# Patient Record
Sex: FEMALE | Race: WHITE | NOT HISPANIC OR LATINO | Employment: OTHER | URBAN - METROPOLITAN AREA
[De-identification: names, ages, dates, MRNs, and addresses within clinical notes are randomized per-mention and may not be internally consistent; named-entity substitution may affect disease eponyms.]

---

## 2017-03-21 ENCOUNTER — OFFICE VISIT (OUTPATIENT)
Dept: INTERNAL MEDICINE | Facility: CLINIC | Age: 77
End: 2017-03-21

## 2017-03-21 VITALS
BODY MASS INDEX: 25.69 KG/M2 | HEART RATE: 72 BPM | RESPIRATION RATE: 16 BRPM | OXYGEN SATURATION: 96 % | TEMPERATURE: 97.6 F | WEIGHT: 152 LBS | SYSTOLIC BLOOD PRESSURE: 112 MMHG | DIASTOLIC BLOOD PRESSURE: 60 MMHG

## 2017-03-21 DIAGNOSIS — J01.00 ACUTE NON-RECURRENT MAXILLARY SINUSITIS: Primary | ICD-10-CM

## 2017-03-21 DIAGNOSIS — H66.91 RIGHT OTITIS MEDIA, UNSPECIFIED CHRONICITY, UNSPECIFIED OTITIS MEDIA TYPE: ICD-10-CM

## 2017-03-21 PROCEDURE — 99213 OFFICE O/P EST LOW 20 MIN: CPT | Performed by: NURSE PRACTITIONER

## 2017-03-21 RX ORDER — AZITHROMYCIN 250 MG/1
TABLET, FILM COATED ORAL
Qty: 6 TABLET | Refills: 0 | Status: SHIPPED | OUTPATIENT
Start: 2017-03-21 | End: 2017-06-19

## 2017-03-21 RX ORDER — FLUTICASONE PROPIONATE 50 MCG
2 SPRAY, SUSPENSION (ML) NASAL DAILY
Qty: 1 EACH | Refills: 0 | Status: SHIPPED | OUTPATIENT
Start: 2017-03-21 | End: 2017-04-20

## 2017-03-21 NOTE — PATIENT INSTRUCTIONS
1. Start Z-pack today. Take 2 tabs today, then 1 tab daily.    2. Take Mucinex Plain 2 times/day for th next week.    3. Use nasal saline followed by Flonase nasal spray - 1 puff each nostril daily, for the next month.    4. Use Visine eye drops daily as needed for itchy eyes. May try Zaditor eye drops daily for itchiness.    5. Track temp daily. Take Tylenol as needed for fever or pain.    6. Rest this week, avoiding exposure to dust and allergens.    7. Call on Friday if not improving. Call Monday if not completely better.    8. Continue other meds.    9. Fasting f/u in June.

## 2017-03-21 NOTE — PROGRESS NOTES
Subjective   Joanne Tineo is a 76 y.o. female.     History of Present Illness     1. Pt c/o feeling like she had the flu with chills, low grade fever of 99 for 2 days about 2 weeks ago. After the 2 days, she developed pink eye. Went to [facility] where she was prescribed Cipro eye drops for pink eye. Because she had a slight tickly cough, was prescribed Augmentin 2 times/day for one week. She feels she got somewhat better, but continued to have intermittent sort throat, right ear ache, sinus congestion, and mild tickly dry cough. She has taken Tylenol with some relief of ear pain; Mucinex 2 times/day for the past week. She also reports that she had major hardwood floor refinishing done at home in the past month with lots of dust throughout her home; dust has now been cleaned out. Pt reports she typically does not have problems with allergies.    The following portions of the patient's history were reviewed and updated as appropriate: allergies, current medications, past family history, past medical history, past social history, past surgical history and problem list.    Review of Systems   Constitutional: Negative for chills, fatigue and fever.   HENT: Positive for ear pain (right), hearing loss (wears hearing aids), sinus pressure and sore throat. Negative for congestion and trouble swallowing.    Eyes: Positive for itching. Negative for pain.   Respiratory: Positive for cough (mild tickly). Negative for shortness of breath and wheezing.    Cardiovascular: Negative for chest pain, palpitations and leg swelling.   Gastrointestinal: Negative for abdominal pain, diarrhea and nausea.   Endocrine: Negative for cold intolerance and heat intolerance.   Genitourinary: Negative for dysuria and hematuria.   Musculoskeletal: Negative for arthralgias, back pain and myalgias.   Skin: Negative for rash and wound.   Allergic/Immunologic: Negative for environmental allergies and food allergies.   Neurological: Negative for  dizziness and headaches.   Hematological: Negative for adenopathy. Does not bruise/bleed easily.   Psychiatric/Behavioral: Negative for sleep disturbance. The patient is not nervous/anxious.        Objective   Blood pressure 112/60, pulse 72, temperature 97.6 °F (36.4 °C), temperature source Oral, resp. rate 16, weight 152 lb (68.9 kg), SpO2 96 %.    Physical Exam   Constitutional: She is oriented to person, place, and time. She appears well-developed and well-nourished.   HENT:   Right Ear: Tympanic membrane is erythematous (mild). Tympanic membrane is not bulging.   Left Ear: Tympanic membrane and ear canal normal.   Nose: Mucosal edema (2+) present. Right sinus exhibits maxillary sinus tenderness (mild). Right sinus exhibits no frontal sinus tenderness. Left sinus exhibits no maxillary sinus tenderness and no frontal sinus tenderness.   Eyes: Lids are normal. Right conjunctiva is injected (mild). Left conjunctiva is injected (mild).   Cardiovascular: Normal rate, regular rhythm and normal heart sounds.    No murmur heard.  Pulmonary/Chest: Effort normal and breath sounds normal.   Abdominal: Normal appearance and bowel sounds are normal. There is no hepatosplenomegaly. There is no tenderness.   Lymphadenopathy:     She has no cervical adenopathy.   Neurological: She is alert and oriented to person, place, and time.   Skin: Skin is warm and dry.   Psychiatric: She has a normal mood and affect. Her speech is normal and behavior is normal.   Vitals reviewed.    Procedures  Assessment/Plan   Joanne was seen today for sinusitis.    Diagnoses and all orders for this visit:    Acute non-recurrent maxillary sinusitis  -     azithromycin (ZITHROMAX Z-LEONILA) 250 MG tablet; Take 2 tablets the first day, then 1 tablet daily for 4 days.  -     fluticasone (FLONASE) 50 MCG/ACT nasal spray; 2 sprays into each nostril Daily for 30 days.    Right otitis media, unspecified chronicity, unspecified otitis media type    1. Sinusitis  and right otitis media: Pt most likely had viral infection 2 weeks ago, which did not respond to Augmentin therapy. She now appears to have a secondary sinusitis and right otitis media. She hopefully will respond to Z-pack. She should take Mucinex Plain 2 times/day for the next week and use nasal saline and Flonase to reduce inflammation in her sinuses. She is to call on Friday is not improving and Monday if not completely better.     2. Pt may have had some allergic activity related to dust exposure in her home. Her eyes have been itchy. Will treat with OTC Visine and Zaditor if needed. She understands to contact this office if her eyes do not improve with this therapy.     Continue other meds.    Fasting f/u in June.    Patient Instructions   1. Start Z-pack today. Take 2 tabs today, then 1 tab daily.    2. Take Mucinex Plain 2 times/day for th next week.    3. Use nasal saline followed by Flonase nasal spray - 1 puff each nostril daily, for the next month.    4. Use Visine eye drops daily as needed for itchy eyes. May try Zaditor eye drops daily for itchiness.    5. Track temp daily. Take Tylenol as needed for fever or pain.    6. Rest this week, avoiding exposure to dust and allergens.    7. Call on Friday if not improving. Call Monday if not completely better.    8. Continue other meds.    9. Fasting f/u in June.      Electronically signed by KIMBERLY Joyce 3/21/2017 1:33 PM

## 2017-06-19 ENCOUNTER — OFFICE VISIT (OUTPATIENT)
Dept: INTERNAL MEDICINE | Facility: CLINIC | Age: 77
End: 2017-06-19

## 2017-06-19 VITALS
TEMPERATURE: 98.3 F | RESPIRATION RATE: 16 BRPM | DIASTOLIC BLOOD PRESSURE: 80 MMHG | OXYGEN SATURATION: 96 % | HEART RATE: 72 BPM | SYSTOLIC BLOOD PRESSURE: 120 MMHG | BODY MASS INDEX: 25.33 KG/M2 | HEIGHT: 65 IN | WEIGHT: 152 LBS

## 2017-06-19 DIAGNOSIS — R53.83 FATIGUE, UNSPECIFIED TYPE: ICD-10-CM

## 2017-06-19 DIAGNOSIS — H91.93 HEARING IMPAIRMENT, BILATERAL: ICD-10-CM

## 2017-06-19 DIAGNOSIS — G47.9 DYSSOMNIA: ICD-10-CM

## 2017-06-19 DIAGNOSIS — H35.30 MACULAR DEGENERATION: ICD-10-CM

## 2017-06-19 DIAGNOSIS — R73.9 HYPERGLYCEMIA: ICD-10-CM

## 2017-06-19 DIAGNOSIS — I44.4 LEFT ANTERIOR FASCICULAR BLOCK: ICD-10-CM

## 2017-06-19 DIAGNOSIS — E78.00 PURE HYPERCHOLESTEROLEMIA: Primary | ICD-10-CM

## 2017-06-19 DIAGNOSIS — E53.9 VITAMIN B DEFICIENCY: ICD-10-CM

## 2017-06-19 DIAGNOSIS — F32.9 REACTIVE DEPRESSION: ICD-10-CM

## 2017-06-19 DIAGNOSIS — E55.9 VITAMIN D DEFICIENCY: ICD-10-CM

## 2017-06-19 DIAGNOSIS — K58.9 IRRITABLE BOWEL SYNDROME WITHOUT DIARRHEA: ICD-10-CM

## 2017-06-19 PROBLEM — J01.00 ACUTE NON-RECURRENT MAXILLARY SINUSITIS: Status: RESOLVED | Noted: 2017-03-21 | Resolved: 2017-06-19

## 2017-06-19 PROBLEM — H66.91 RIGHT OTITIS MEDIA: Status: RESOLVED | Noted: 2017-03-21 | Resolved: 2017-06-19

## 2017-06-19 LAB
25(OH)D3+25(OH)D2 SERPL-MCNC: 26.8 NG/ML
ALBUMIN SERPL-MCNC: 4.1 G/DL (ref 3.2–4.8)
ALBUMIN/GLOB SERPL: 1.5 G/DL (ref 1.5–2.5)
ALP SERPL-CCNC: 124 U/L (ref 25–100)
ALT SERPL-CCNC: 30 U/L (ref 7–40)
APPEARANCE UR: CLEAR
AST SERPL-CCNC: 25 U/L (ref 0–33)
BACTERIA #/AREA URNS HPF: ABNORMAL /HPF
BASOPHILS # BLD AUTO: 0.01 10*3/MM3 (ref 0–0.2)
BASOPHILS NFR BLD AUTO: 0.2 % (ref 0–1)
BILIRUB SERPL-MCNC: 0.6 MG/DL (ref 0.3–1.2)
BILIRUB UR QL STRIP: NEGATIVE
BUN SERPL-MCNC: 22 MG/DL (ref 9–23)
BUN/CREAT SERPL: 31.4 (ref 7–25)
CALCIUM SERPL-MCNC: 9.5 MG/DL (ref 8.7–10.4)
CASTS URNS MICRO: ABNORMAL
CHLORIDE SERPL-SCNC: 105 MMOL/L (ref 99–109)
CHOLEST SERPL-MCNC: 200 MG/DL (ref 0–200)
CO2 SERPL-SCNC: 29 MMOL/L (ref 20–31)
COLOR UR: YELLOW
CREAT SERPL-MCNC: 0.7 MG/DL (ref 0.6–1.3)
EOSINOPHIL # BLD AUTO: 0.03 10*3/MM3 (ref 0.1–0.3)
EOSINOPHIL NFR BLD AUTO: 0.5 % (ref 0–3)
EPI CELLS #/AREA URNS HPF: ABNORMAL /HPF
ERYTHROCYTE [DISTWIDTH] IN BLOOD BY AUTOMATED COUNT: 14.4 % (ref 11.3–14.5)
GLOBULIN SER CALC-MCNC: 2.7 GM/DL
GLUCOSE SERPL-MCNC: 101 MG/DL (ref 70–100)
GLUCOSE UR QL: NEGATIVE
HBA1C MFR BLD: 6.2 % (ref 4.8–5.6)
HCT VFR BLD AUTO: 48.4 % (ref 34.5–44)
HDLC SERPL-MCNC: 46 MG/DL (ref 40–60)
HGB BLD-MCNC: 15.3 G/DL (ref 11.5–15.5)
HGB UR QL STRIP: NEGATIVE
IMM GRANULOCYTES # BLD: 0.01 10*3/MM3 (ref 0–0.03)
IMM GRANULOCYTES NFR BLD: 0.2 % (ref 0–0.6)
KETONES UR QL STRIP: NEGATIVE
LDLC SERPL CALC-MCNC: 107 MG/DL (ref 0–100)
LEUKOCYTE ESTERASE UR QL STRIP: ABNORMAL
LYMPHOCYTES # BLD AUTO: 1.92 10*3/MM3 (ref 0.6–4.8)
LYMPHOCYTES NFR BLD AUTO: 32.4 % (ref 24–44)
MCH RBC QN AUTO: 28.5 PG (ref 27–31)
MCHC RBC AUTO-ENTMCNC: 31.6 G/DL (ref 32–36)
MCV RBC AUTO: 90.1 FL (ref 80–99)
MONOCYTES # BLD AUTO: 0.43 10*3/MM3 (ref 0–1)
MONOCYTES NFR BLD AUTO: 7.3 % (ref 0–12)
NEUTROPHILS # BLD AUTO: 3.53 10*3/MM3 (ref 1.5–8.3)
NEUTROPHILS NFR BLD AUTO: 59.4 % (ref 41–71)
NITRITE UR QL STRIP: NEGATIVE
PH UR STRIP: 7 [PH] (ref 5–8)
PLATELET # BLD AUTO: 221 10*3/MM3 (ref 150–450)
POTASSIUM SERPL-SCNC: 5 MMOL/L (ref 3.5–5.5)
PROT SERPL-MCNC: 6.8 G/DL (ref 5.7–8.2)
PROT UR QL STRIP: NEGATIVE
RBC # BLD AUTO: 5.37 10*6/MM3 (ref 3.89–5.14)
RBC #/AREA URNS HPF: ABNORMAL /HPF
SODIUM SERPL-SCNC: 142 MMOL/L (ref 132–146)
SP GR UR: 1.02 (ref 1–1.03)
TRIGL SERPL-MCNC: 236 MG/DL (ref 0–150)
TSH SERPL DL<=0.005 MIU/L-ACNC: 1.7 MIU/ML (ref 0.35–5.35)
UROBILINOGEN UR STRIP-MCNC: ABNORMAL MG/DL
VIT B12 SERPL-MCNC: 602 PG/ML (ref 211–911)
VLDLC SERPL CALC-MCNC: 47.2 MG/DL
WBC # BLD AUTO: 5.93 10*3/MM3 (ref 3.5–10.8)
WBC #/AREA URNS HPF: ABNORMAL /HPF

## 2017-06-19 PROCEDURE — 93000 ELECTROCARDIOGRAM COMPLETE: CPT | Performed by: INTERNAL MEDICINE

## 2017-06-19 PROCEDURE — 99215 OFFICE O/P EST HI 40 MIN: CPT | Performed by: INTERNAL MEDICINE

## 2017-06-19 RX ORDER — ESCITALOPRAM OXALATE 20 MG/1
10 TABLET ORAL DAILY
Qty: 135 TABLET | Refills: 3 | Status: SHIPPED | OUTPATIENT
Start: 2017-06-19 | End: 2018-08-02 | Stop reason: SDUPTHER

## 2017-06-19 NOTE — PROGRESS NOTES
Subjective   Joanne Tineo is a 77 y.o. female.     Chief Complaint   Patient presents with   • Hyperlipidemia       History of Present Illness     The patient has a moderate history of hyperlipidemia with a total value greater than 300.  Both parents lived to older ages but  of heart disease.  She has moderate heart disease in multiple family members.  In recent years she is prediabetic with a hemoglobin A1c of 6.0% last winter.  She has never used tobacco products.  She does follow a diabetic diet.  She has a regular walking program.  She has never had an atherosclerotic event.    The following portions of the patient's history were reviewed and updated as appropriate: allergies, current medications, past family history, past medical history, past social history, past surgical history and problem list.    Active Ambulatory Problems     Diagnosis Date Noted   • Compression fracture of lumbar vertebra 2016   • Depression 2016   • Hearing impairment 2016   • Hyperlipidemia 2016   • Irritable bowel syndrome 2016   • Left anterior fascicular block 2016   • Macular degeneration 2016   • Menopausal and postmenopausal disorder 2016   • Dyssomnia 2016   • Vitamin B deficiency 2016   • Preventative health care 2016   • Hyperglycemia 2016   • Family history of heart disease 2016     Resolved Ambulatory Problems     Diagnosis Date Noted   • Cardiac disease 2016   • Impaired glucose tolerance 2016   • Prediabetes 2016   • Acute non-recurrent maxillary sinusitis 2017   • Right otitis media 2017     Past Medical History:   Diagnosis Date   • Chronic lower back pain    • Depression    • Edema    • Fracture of pelvis    • Hearing impairment    • Hypercholesterolemia    • Hyperlipidemia    • Irritable bowel syndrome    • Macular degeneration    • Pneumonia    • Prediabetes    • Sleep disorder     • Vitamin D deficiency      Past Surgical History:   Procedure Laterality Date   • DIAGNOSTIC LAPAROSCOPY      for infertility   • TONSILLECTOMY       Family History   Problem Relation Age of Onset   • Heart attack Mother       age 84   • Coronary artery disease Father 79     CABG   • Dementia Father       age 90   • Colon cancer Brother 65      age 70   • Heart disease Other      Multiple family members    • Kidney cancer Other            Social History     Social History   • Marital status:      Spouse name: N/A   • Number of children: N/A   • Years of education: N/A     Occupational History   • Not on file.     Social History Main Topics   • Smoking status: Never Smoker   • Smokeless tobacco: Never Used   • Alcohol use 1.2 oz/week     2 Glasses of wine per week   • Drug use: No   • Sexual activity: No     Other Topics Concern   • Not on file     Social History Narrative    Domestic life- Lives at home alone  -          Holiness- Catholic        Sleep hygiene- in bed 10 PM to 6 AM for 7 or 8 hours - often broken - off clonazepam         Caffeine use- Drinks 2 cups of coffee daily        Exercise habits- Walks 30 minutes daily - pilates 2 days weekly  compliance irregular        Diet-   low calorie diabetic diet - low in salt low in sugar        Occupation- Retired teacher        Hearing : Corrects with hearing aids         Vision : Corrects with reading and distant vision glasses        Driving : No limitations             Review of Systems   Constitutional: Negative for appetite change and fatigue.   HENT: Negative for ear pain and sore throat.    Eyes: Negative for itching and visual disturbance.   Respiratory: Negative for cough and shortness of breath.    Cardiovascular: Negative for chest pain and palpitations.   Gastrointestinal: Negative for abdominal pain and nausea.   Endocrine: Negative for cold intolerance and heat intolerance.   Genitourinary:  "Negative for dysuria and hematuria.   Musculoskeletal: Negative for arthralgias and back pain.   Skin: Negative for rash and wound.   Allergic/Immunologic: Negative for environmental allergies and food allergies.   Neurological: Negative for dizziness and headaches.   Hematological: Negative for adenopathy. Does not bruise/bleed easily.   Psychiatric/Behavioral: Positive for dysphoric mood. Negative for sleep disturbance. The patient is nervous/anxious.         Sleep disturbance mildly improved has been off of clonazepam for 3 months.  Anxiety and depression responding well to Lexapro.       Objective   Blood pressure 120/80, pulse 72, temperature 98.3 °F (36.8 °C), temperature source Oral, resp. rate 16, height 65\" (165.1 cm), weight 152 lb (68.9 kg), SpO2 96 %.    Physical Exam   Constitutional: She is oriented to person, place, and time. She appears well-developed and well-nourished. No distress.   HENT:   Right Ear: External ear normal.   Left Ear: External ear normal.   Nose: Nose normal.   Mouth/Throat: Oropharynx is clear and moist.   Eyes: EOM are normal. Pupils are equal, round, and reactive to light. No scleral icterus.   Neck: Normal range of motion. Neck supple. No JVD present. No thyromegaly present.   Cardiovascular: Normal rate, regular rhythm, normal heart sounds and intact distal pulses.    No murmur heard.  Pulmonary/Chest: Effort normal and breath sounds normal. She has no wheezes. She has no rales.   Abdominal: Soft. Bowel sounds are normal. She exhibits no distension and no mass. There is no tenderness.   Genitourinary:   Genitourinary Comments: [facility]   Musculoskeletal: Normal range of motion. She exhibits no edema or tenderness.   Lymphadenopathy:     She has no cervical adenopathy.   Neurological: She is alert and oriented to person, place, and time. She displays normal reflexes. No cranial nerve deficit. She exhibits normal muscle tone. Coordination normal.   Vibratory " normal  Romberg negative  Gait normal  Plantars downgoing             Skin: Skin is warm and dry. No rash noted.   Breast exam normal   Psychiatric: She has a normal mood and affect. Her behavior is normal. Judgment and thought content normal.   Nursing note and vitals reviewed.      ECG 12 Lead  Date/Time: 6/19/2017 8:59 AM  Performed by: YANG COTA  Authorized by: YANG COTA   Interpreted by ED physician: Yang Cota M.D.  Comparison: compared with previous ECG from 5/18/2016  Similar to previous ECG  Rhythm: sinus rhythm  Rate: normal  BPM: 65  Conduction: LAFB  ST Segments: ST segments normal  T Waves: T waves normal  QRS axis: left  Other: no other findings  Clinical impression: non-specific ECG  Comments: Indication - LAFB  Baseline EKG          Assessment/Plan   Joanne was seen today for hyperlipidemia.    Diagnoses and all orders for this visit:    Pure hypercholesterolemia  -     Comprehensive Metabolic Panel  -     Lipid Panel    Left anterior fascicular block  -     ECG 12 Lead    Irritable bowel syndrome without diarrhea  -     CBC & Differential  -     Urinalysis With / Microscopic If Indicated    Vitamin B deficiency  -     Vitamin B12    Reactive depression    Dyssomnia    Hyperglycemia  -     Hemoglobin A1c    Hearing impairment, bilateral    Macular degeneration    Vitamin D deficiency  -     Vitamin D 25 Hydroxy    Fatigue, unspecified type  -     TSH    Other orders  -     escitalopram (LEXAPRO) 20 MG tablet; Take 0.5 tablets by mouth Daily. Take total of 15 mg daily.  This EMR will not accept this dose.  -     Microscopic Examination      The patient has a marked increased risk of atherosclerotic disease.    Her LDL cholesterol has increased to 107.  She will need to increase atorvastatin and may need to move to Crestor or Vytorin.  I've asked her to continue on aspirin for primary prevention.    Her hemoglobin A1c has increased and she may need metformin in the next year.   Her prediabetes is his second factor increasing risk of heart disease.    The patient's had a lifelong history of clinical depression.  She had acute flare 7 years ago with the death of her .  She is done extremely well this last year.  We will drop her to Lexapro 15 mg daily to seek out an optimal dose.  She has a stable lifestyle and has a well-balanced diet including international travel plans.    The patient has irritable bowel syndrome which has greatly stabilized with diet and psyllium.  Her brother  at age 70 of colon cancer.  I've counseled the patient on her cancer risk.  The patient thinks that she had a colonoscopy in the last year but we do not have a copy.     Patient Instructions   1.  Continue same medications and supplements - as listed.    2.  Reduce Lexapro 15 mg daily - for long-term safety.    3.  Improve upper body fitness - dedicated exercises twice weekly.    4.  Continue Pilates - twice weekly.    5.  Walk 6000 steps to 9000 steps daily - for aerobic conditioning.    6.  Follow a low-calorie diabetic diet - low in salt and low in sugar.    7.  Schedule pelvic exam this year - for complete checkup.    8.  Return visit September - fasting checkup and wellness exam.    9.  Hemoglobin A1c slightly higher at 6.2%.  Continue tight diabetic diet and daily walking.    10.  Vitamin D level mildly low at 27.  Start vitamin D3 2000 and is daily.    11.  LDL cholesterol mildly elevated 107.  Increase atorvastatin 80 mg daily.    12.  Other laboratory tests are acceptable and require no change in treatment.    Current Outpatient Prescriptions:   •  aspirin 81 MG tablet, Take 1 tablet by mouth. 3-7 days weekly , Disp: , Rfl:   •  atorvastatin (LIPITOR) 40 MG tablet, Take 1 tablet by mouth Every Night., Disp: 90 tablet, Rfl: 1  •  calcium-vitamin D (OSCAL 500/200 D-3) 500-200 MG-UNIT per tablet, Take 1 tablet by mouth daily., Disp: , Rfl:   •  Coenzyme Q10 (CO Q-10) 200 MG capsule, Take 1  capsule by mouth daily., Disp: , Rfl:   •  escitalopram (LEXAPRO) 20 MG tablet, Take 0.5 tablets by mouth Daily. Take total of 15 mg daily.  This EMR will not accept this dose., Disp: 135 tablet, Rfl: 3  •  Multiple Vitamins-Minerals (PRESERVISION AREDS 2) capsule, Take 1 capsule by mouth daily., Disp: , Rfl:   •  Multiple Vitamins-Minerals (WOMENS MULTI VITAMIN & MINERAL) tablet, Take 1 tablet by mouth daily., Disp: , Rfl:   •  Omega-3 Fatty Acids (FISH OIL) 1000 MG capsule capsule, Take 1 capsule by mouth daily., Disp: , Rfl:   •  psyllium (METAMUCIL) 0.52 G capsule, Take 3 capsules by mouth daily., Disp: , Rfl:     No Known Allergies    Immunization History   Administered Date(s) Administered   • Influenza, Quadrivalent 11/02/2015   • Pneumococcal Polysaccharide 11/02/2015   • Tdap 08/16/2012   • Zoster 01/01/2015   • influenza Split 12/05/2016     Electronically signed Yang Cota M.D.6/20/2017 6:44 AM

## 2017-06-19 NOTE — PATIENT INSTRUCTIONS
1.  Continue same medications and supplements - as listed.    2.  Reduce Lexapro 15 mg daily - for long-term safety.    3.  Improve upper body fitness - dedicated exercises twice weekly.    4.  Continue Pilates - twice weekly.    5.  Walk 6000 steps to 9000 steps daily - for aerobic conditioning.    6.  Follow a low-calorie diabetic diet - low in salt and low in sugar.    7.  Schedule pelvic exam this year - for complete checkup.    8.  Return visit September - fasting checkup and wellness exam.

## 2017-06-20 RX ORDER — CHOLECALCIFEROL (VITAMIN D3) 125 MCG
1 CAPSULE ORAL DAILY
COMMUNITY
End: 2022-12-14

## 2017-06-22 ENCOUNTER — TELEPHONE (OUTPATIENT)
Dept: INTERNAL MEDICINE | Facility: CLINIC | Age: 77
End: 2017-06-22

## 2017-06-22 RX ORDER — ATORVASTATIN CALCIUM 80 MG/1
80 TABLET, FILM COATED ORAL NIGHTLY
Qty: 90 TABLET | Refills: 1 | Status: SHIPPED | OUTPATIENT
Start: 2017-06-22 | End: 2018-01-29 | Stop reason: SDUPTHER

## 2017-06-22 NOTE — TELEPHONE ENCOUNTER
Called lab results to pt. Per TGF:  - HbA1c up 6.2%. Cont tight DM diet & QD walking.  - D level mildly low at 27 - restart vit D3 2000 QD (was off it while on vacation)  - .  Increase atorvastatin 80 mg daily.  - Other results ok - require no change in treatment.  Pt verb understanding.

## 2017-06-26 ENCOUNTER — TELEPHONE (OUTPATIENT)
Dept: INTERNAL MEDICINE | Facility: CLINIC | Age: 77
End: 2017-06-26

## 2017-06-26 NOTE — TELEPHONE ENCOUNTER
Called pt for most recent colonoscopy by who and when per TGF - pt states it was rather recent, 2016?, but will call back with info.

## 2017-06-26 NOTE — TELEPHONE ENCOUNTER
----- Message from Ambika Khan sent at 6/26/2017  9:30 AM EDT -----  someone called and ask for information regarding her last colonoscopy   Dr.Warren Barnes  8- just wanted to pass along this information        TGF notified. Maryam to get report sent over per TGF.

## 2017-10-05 ENCOUNTER — LAB REQUISITION (OUTPATIENT)
Dept: LAB | Facility: HOSPITAL | Age: 77
End: 2017-10-05

## 2017-10-05 ENCOUNTER — OFFICE VISIT (OUTPATIENT)
Dept: INTERNAL MEDICINE | Facility: CLINIC | Age: 77
End: 2017-10-05

## 2017-10-05 VITALS
HEART RATE: 68 BPM | WEIGHT: 153.2 LBS | SYSTOLIC BLOOD PRESSURE: 112 MMHG | RESPIRATION RATE: 16 BRPM | HEIGHT: 64 IN | OXYGEN SATURATION: 97 % | DIASTOLIC BLOOD PRESSURE: 64 MMHG | TEMPERATURE: 97.7 F | BODY MASS INDEX: 26.15 KG/M2

## 2017-10-05 DIAGNOSIS — F32.9 REACTIVE DEPRESSION: ICD-10-CM

## 2017-10-05 DIAGNOSIS — E55.9 VITAMIN D DEFICIENCY: ICD-10-CM

## 2017-10-05 DIAGNOSIS — E78.00 PURE HYPERCHOLESTEROLEMIA: Primary | ICD-10-CM

## 2017-10-05 DIAGNOSIS — K58.9 IRRITABLE BOWEL SYNDROME WITHOUT DIARRHEA: ICD-10-CM

## 2017-10-05 DIAGNOSIS — Z00.00 PREVENTATIVE HEALTH CARE: ICD-10-CM

## 2017-10-05 DIAGNOSIS — R73.9 HYPERGLYCEMIA: ICD-10-CM

## 2017-10-05 DIAGNOSIS — Z00.00 ROUTINE GENERAL MEDICAL EXAMINATION AT A HEALTH CARE FACILITY: ICD-10-CM

## 2017-10-05 LAB
ALBUMIN SERPL-MCNC: 4.4 G/DL (ref 3.2–4.8)
ALBUMIN/GLOB SERPL: 1.6 G/DL (ref 1.5–2.5)
ALP SERPL-CCNC: 116 U/L (ref 25–100)
ALT SERPL-CCNC: 40 U/L (ref 7–40)
AST SERPL-CCNC: 26 U/L (ref 0–33)
BILIRUB SERPL-MCNC: 0.7 MG/DL (ref 0.3–1.2)
BUN SERPL-MCNC: 19 MG/DL (ref 9–23)
BUN/CREAT SERPL: 27.1 (ref 7–25)
CALCIUM SERPL-MCNC: 9.4 MG/DL (ref 8.7–10.4)
CHLORIDE SERPL-SCNC: 104 MMOL/L (ref 99–109)
CHOLEST SERPL-MCNC: 200 MG/DL (ref 0–200)
CO2 SERPL-SCNC: 28 MMOL/L (ref 20–31)
CREAT SERPL-MCNC: 0.7 MG/DL (ref 0.6–1.3)
GLOBULIN SER CALC-MCNC: 2.7 GM/DL
GLUCOSE SERPL-MCNC: 98 MG/DL (ref 70–100)
HBA1C MFR BLD: 5.6 % (ref 4.8–5.6)
HDLC SERPL-MCNC: 52 MG/DL (ref 40–60)
LDLC SERPL CALC-MCNC: 95 MG/DL (ref 0–100)
POTASSIUM SERPL-SCNC: 4.5 MMOL/L (ref 3.5–5.5)
PROT SERPL-MCNC: 7.1 G/DL (ref 5.7–8.2)
SODIUM SERPL-SCNC: 141 MMOL/L (ref 132–146)
TRIGL SERPL-MCNC: 265 MG/DL (ref 0–150)
VLDLC SERPL CALC-MCNC: 53 MG/DL

## 2017-10-05 PROCEDURE — 99213 OFFICE O/P EST LOW 20 MIN: CPT | Performed by: INTERNAL MEDICINE

## 2017-10-05 PROCEDURE — G0439 PPPS, SUBSEQ VISIT: HCPCS | Performed by: INTERNAL MEDICINE

## 2017-10-05 PROCEDURE — G0008 ADMIN INFLUENZA VIRUS VAC: HCPCS | Performed by: INTERNAL MEDICINE

## 2017-10-05 PROCEDURE — 36415 COLL VENOUS BLD VENIPUNCTURE: CPT | Performed by: INTERNAL MEDICINE

## 2017-10-05 PROCEDURE — 90662 IIV NO PRSV INCREASED AG IM: CPT | Performed by: INTERNAL MEDICINE

## 2017-10-05 PROCEDURE — 96160 PT-FOCUSED HLTH RISK ASSMT: CPT | Performed by: INTERNAL MEDICINE

## 2017-10-05 NOTE — PROGRESS NOTES
QUICK REFERENCE INFORMATION:  The ABCs of the Annual Wellness Visit    Subsequent Medicare Wellness Visit    HEALTH RISK ASSESSMENT    1940    Recent Hospitalizations:  No hospitalization(s) within the last year..        Current Medical Providers:  Patient Care Team:  Yang Cota MD as PCP - General  Yang Cota MD as PCP - Family Medicine        Smoking Status:  History   Smoking Status   • Never Smoker   Smokeless Tobacco   • Never Used       Alcohol Consumption:  History   Alcohol Use   • 1.2 oz/week   • 2 Glasses of wine per week       Depression Screen:   PHQ-2/PHQ-9 Depression Screening 6/20/2017   Little interest or pleasure in doing things 0   Feeling down, depressed, or hopeless 0   Total Score 0       Health Habits and Functional and Cognitive Screening:  No flowsheet data found.           Does the patient have evidence of cognitive impairment? No    Aspirin use counseling: Taking ASA appropriately as indicated      Recent Lab Results:  CMP:  Lab Results   Component Value Date     (H) 06/19/2017    BUN 22 06/19/2017    CREATININE 0.70 06/19/2017    EGFRIFNONA 81 06/19/2017    EGFRIFAFRI 99 06/19/2017    BCR 31.4 (H) 06/19/2017     06/19/2017    K 5.0 06/19/2017    CO2 29.0 06/19/2017    CALCIUM 9.5 06/19/2017    PROTENTOTREF 6.8 06/19/2017    ALBUMIN 4.10 06/19/2017    LABGLOBREF 2.7 06/19/2017    LABIL2 1.5 06/19/2017    BILITOT 0.6 06/19/2017    ALKPHOS 124 (H) 06/19/2017    AST 25 06/19/2017    ALT 30 06/19/2017     Lipid Panel:  Lab Results   Component Value Date    TRIG 236 (H) 06/19/2017    HDL 46 06/19/2017    VLDL 47.2 06/19/2017    LDLDIRECT 74 05/18/2016     HbA1c:  Lab Results   Component Value Date    HGBA1C 6.20 (H) 06/19/2017       Visual Acuity:  No exam data present    Age-appropriate Screening Schedule:  Refer to the list below for future screening recommendations based on patient's age, sex and/or medical conditions. Orders for these recommended tests are  listed in the plan section. The patient has been provided with a written plan.    Health Maintenance   Topic Date Due   • COLONOSCOPY  05/26/2016   • PNEUMOCOCCAL VACCINES (65+ LOW/MEDIUM RISK) (2 of 2 - PCV13) 11/02/2016   • MAMMOGRAM  05/14/2017   • INFLUENZA VACCINE  08/01/2017   • LIPID PANEL  06/19/2018   • TDAP/TD VACCINES (2 - Td) 08/16/2022   • ZOSTER VACCINE  Completed        Subjective   History of Present Illness    Joanne Tineo is a 77 y.o. female who presents for an Subsequent Wellness Visit.    The following portions of the patient's history were reviewed and updated as appropriate: allergies, current medications, past family history, past medical history, past social history, past surgical history and problem list.    Outpatient Medications Prior to Visit   Medication Sig Dispense Refill   • aspirin 81 MG tablet Take 1 tablet by mouth. 3-7 days weekly      • atorvastatin (LIPITOR) 80 MG tablet Take 1 tablet by mouth Every Night. 90 tablet 1   • calcium-vitamin D (OSCAL 500/200 D-3) 500-200 MG-UNIT per tablet Take 1 tablet by mouth daily.     • Cholecalciferol (VITAMIN D3) 2000 UNITS tablet Take 1 tablet by mouth Daily.     • Coenzyme Q10 (CO Q-10) 200 MG capsule Take 1 capsule by mouth daily.     • escitalopram (LEXAPRO) 20 MG tablet Take 0.5 tablets by mouth Daily. Take total of 15 mg daily.  This EMR will not accept this dose. 135 tablet 3   • Multiple Vitamins-Minerals (PRESERVISION AREDS 2) capsule Take 1 capsule by mouth daily.     • Multiple Vitamins-Minerals (WOMENS MULTI VITAMIN & MINERAL) tablet Take 1 tablet by mouth daily.     • Omega-3 Fatty Acids (FISH OIL) 1000 MG capsule capsule Take 1 capsule by mouth daily.     • psyllium (METAMUCIL) 0.52 G capsule Take 3 capsules by mouth daily.       No facility-administered medications prior to visit.        Patient Active Problem List   Diagnosis   • Compression fracture of lumbar vertebra   • Depression   • Hearing impairment   •  "Hyperlipidemia   • Irritable bowel syndrome   • Left anterior fascicular block   • Macular degeneration   • Dyssomnia   • Vitamin B deficiency   • Preventative health care   • Hyperglycemia   • Family history of heart disease       Advance Care Planning:  has an advance directive - a copy HAS NOT been provided    Identification of Risk Factors:  Risk factors include: weight , unhealthy diet, cardiovascular risk, inactivity, isolation, depression and hearing limitations.    Review of Systems    Compared to one year ago, the patient feels her physical health is the same.  Compared to one year ago, the patient feels her mental health is the same.    Objective     Physical Exam    Vitals:    10/05/17 1043   BP: 112/64   BP Location: Right arm   Patient Position: Sitting   Pulse: 68   Resp: 16   Temp: 97.7 °F (36.5 °C)   TempSrc: Oral   SpO2: 97%  Comment: RA   Weight: 153 lb 3.2 oz (69.5 kg)   Height: 64.25\" (163.2 cm)       Body mass index is 26.09 kg/(m^2).  Discussed the patient's BMI with her. The BMI is in the acceptable range.    Assessment/Plan   Patient Self-Management and Personalized Health Advice  The patient has been provided with information about: diet, exercise, weight management, alcohol use, fall prevention and mental health concerns and preventive services including:   · Alcohol use counseling completed, Bone densitometry screening, Colorectal cancer screening, colonoscopy referral placed, Exercise counseling provided, Fall Risk assessment done, Fall Risk plan of care done, Screening mammography, referral placed.    Visit Diagnoses:  No diagnosis found.    No orders of the defined types were placed in this encounter.      Outpatient Encounter Prescriptions as of 10/5/2017   Medication Sig Dispense Refill   • aspirin 81 MG tablet Take 1 tablet by mouth. 3-7 days weekly      • atorvastatin (LIPITOR) 80 MG tablet Take 1 tablet by mouth Every Night. 90 tablet 1   • calcium-vitamin D (OSCAL 500/200 D-3) " 500-200 MG-UNIT per tablet Take 1 tablet by mouth daily.     • Cholecalciferol (VITAMIN D3) 2000 UNITS tablet Take 1 tablet by mouth Daily.     • Coenzyme Q10 (CO Q-10) 200 MG capsule Take 1 capsule by mouth daily.     • escitalopram (LEXAPRO) 20 MG tablet Take 0.5 tablets by mouth Daily. Take total of 15 mg daily.  This EMR will not accept this dose. 135 tablet 3   • Multiple Vitamins-Minerals (PRESERVISION AREDS 2) capsule Take 1 capsule by mouth daily.     • Multiple Vitamins-Minerals (WOMENS MULTI VITAMIN & MINERAL) tablet Take 1 tablet by mouth daily.     • Omega-3 Fatty Acids (FISH OIL) 1000 MG capsule capsule Take 1 capsule by mouth daily.     • psyllium (METAMUCIL) 0.52 G capsule Take 3 capsules by mouth daily.       No facility-administered encounter medications on file as of 10/5/2017.        Reviewed use of high risk medication in the elderly: yes  Reviewed for potential of harmful drug interactions in the elderly: yes    Follow Up:  No Follow-up on file.     An After Visit Summary and PPPS with all of these plans were given to the patient.     The wellness exam has been reviewed in detail.  The patient has been fully counseled on preventative guidelines for vaccines, cancer screenings, and other health maintenance needs.  Functional testing has been performed to assess capacity for independent living and need for other medical interventions.   The patient was counseled on maintaining a lifestyle to promote good health and to minimize chronic diseases.  The patient has been assisted with scheduling healthcare procedures for the coming year and given a written document outlining these recommendations.    Electronically signed Yang Cota M.D.10/6/2017 9:07 AM

## 2017-10-05 NOTE — PATIENT INSTRUCTIONS
1.  Continue usual medicines and supplements - as listed.    2.  Maintain a routine physical fitness program - every week.    3.  Follow well-balanced diabetic diet - low in salt and low in sugar.    4.  Speak to nurse next week - about test results.    5.  Return visit 4 months - fasting checkup.

## 2017-10-05 NOTE — PROGRESS NOTES
"Subjective   Joanne Tineo is a 77 y.o. female.     History of Present Illness     The patient has a history of moderate hyperlipidemia.  She's been on atorvastatin for many years with her most recent LDL cholesterol at 107.  She has had mild hyperglycemia and follows a diabetic diet.  Both parents had heart disease.    The following portions of the patient's history were reviewed and updated as appropriate: allergies, current medications, past family history, past medical history, past social history, past surgical history and problem list.    Review of Systems   Constitutional: Negative for appetite change and fatigue.   Gastrointestinal: Positive for abdominal pain and constipation. Negative for abdominal distention and nausea.   Neurological: Negative for dizziness and light-headedness.   Psychiatric/Behavioral: Positive for dysphoric mood. Negative for sleep disturbance.        Depression has been well compensated       Objective   Blood pressure 112/64, pulse 68, temperature 97.7 °F (36.5 °C), temperature source Oral, resp. rate 16, height 64.25\" (163.2 cm), weight 153 lb 3.2 oz (69.5 kg), SpO2 97 %.    Physical Exam   Constitutional: She is oriented to person, place, and time. She appears well-developed and well-nourished. No distress.   Cardiovascular: Normal rate, regular rhythm and normal heart sounds.    Pulmonary/Chest: Effort normal and breath sounds normal. She has no wheezes. She has no rales.   Abdominal: Soft. Bowel sounds are normal. She exhibits no distension and no mass. There is no tenderness.   Neurological: She is alert and oriented to person, place, and time. She exhibits normal muscle tone. Coordination normal.   Psychiatric: She has a normal mood and affect. Her behavior is normal. Judgment and thought content normal.   Nursing note and vitals reviewed.    Procedures  Assessment/Plan   Joanne was seen today for annual exam and hyperlipidemia.    Diagnoses and all orders for this " visit:    Pure hypercholesterolemia  -     Comprehensive Metabolic Panel  -     Lipid Panel    Irritable bowel syndrome without diarrhea    Vitamin D deficiency    Hyperglycemia  -     Hemoglobin A1c    Reactive depression    Preventative health care    Other orders  -     Flu Vaccine High Dose PF 65YR+    The patient's LDL cholesterol has improved at 95.  She would have to switch to Crestor or Vytorin to have more aggressive treatment.  I've asked her to stay on aspirin for primary prevention.    The patient's had several years of prediabetes.  Her hemoglobin A1c has dropped from 6.2% down to 5.6%.  We will encourage her to stay on a tight diabetic diet with a goal rate below 150 pounds.    The patient has an intermittent history of depression.  She is doing well on milligrams of Lexapro.  She seems to have a very stable lifestyle with good communication with friends and family.    The wellness exam has been reviewed in detail.  The patient has been fully counseled on preventative guidelines for vaccines, cancer screenings, and other health maintenance needs.  Functional testing has been performed to assess capacity for independent living and need for other medical interventions.   The patient was counseled on maintaining a lifestyle to promote good health and to minimize chronic diseases.  The patient has been assisted with scheduling healthcare procedures for the coming year and given a written document outlining these recommendations.    Patient Instructions   1.  Continue usual medicines and supplements - as listed.    2.  Maintain a routine physical fitness program - every week.    3.  Follow well-balanced diabetic diet - low in salt and low in sugar.    4.  Speak to nurse next week - about test results.    5.  Return visit 4 months - fasting checkup.    6.  LDL cholesterol acceptable at 95.    7.  Hemoglobin A1c fully normalized at 5.6%.  Continue diabetic diet.    8.  Continue panel is acceptable requires no  change in treatment.    Electronically signed Yang Cota M.D.10/6/2017 9:10 AM

## 2017-10-09 ENCOUNTER — TELEPHONE (OUTPATIENT)
Dept: INTERNAL MEDICINE | Facility: CLINIC | Age: 77
End: 2017-10-09

## 2017-10-09 NOTE — TELEPHONE ENCOUNTER
Called re: lab results.  Per TGF -  LDL cholesterol acceptable at 95. Hemoglobin A1c fully normalized at 5.6%.  Continue diabetic diet. Continue panel is acceptable requires no change in treatment.  Pt verb understanding.

## 2018-01-29 ENCOUNTER — TELEPHONE (OUTPATIENT)
Dept: INTERNAL MEDICINE | Facility: CLINIC | Age: 78
End: 2018-01-29

## 2018-01-29 RX ORDER — ATORVASTATIN CALCIUM 80 MG/1
80 TABLET, FILM COATED ORAL NIGHTLY
Qty: 90 TABLET | Refills: 1 | Status: SHIPPED | OUTPATIENT
Start: 2018-01-29 | End: 2018-08-02 | Stop reason: SDUPTHER

## 2018-06-20 ENCOUNTER — OFFICE VISIT (OUTPATIENT)
Dept: INTERNAL MEDICINE | Facility: CLINIC | Age: 78
End: 2018-06-20

## 2018-06-20 ENCOUNTER — LAB REQUISITION (OUTPATIENT)
Dept: LAB | Facility: HOSPITAL | Age: 78
End: 2018-06-20

## 2018-06-20 VITALS
SYSTOLIC BLOOD PRESSURE: 116 MMHG | BODY MASS INDEX: 25.74 KG/M2 | HEART RATE: 68 BPM | WEIGHT: 150.8 LBS | RESPIRATION RATE: 16 BRPM | DIASTOLIC BLOOD PRESSURE: 80 MMHG | OXYGEN SATURATION: 97 % | HEIGHT: 64 IN | TEMPERATURE: 97.7 F

## 2018-06-20 DIAGNOSIS — Z00.00 ROUTINE GENERAL MEDICAL EXAMINATION AT A HEALTH CARE FACILITY: ICD-10-CM

## 2018-06-20 DIAGNOSIS — E78.00 PURE HYPERCHOLESTEROLEMIA: Primary | ICD-10-CM

## 2018-06-20 DIAGNOSIS — E55.9 VITAMIN D DEFICIENCY: ICD-10-CM

## 2018-06-20 DIAGNOSIS — K58.9 IRRITABLE BOWEL SYNDROME WITHOUT DIARRHEA: ICD-10-CM

## 2018-06-20 DIAGNOSIS — R73.9 HYPERGLYCEMIA: ICD-10-CM

## 2018-06-20 DIAGNOSIS — G47.9 DYSSOMNIA: ICD-10-CM

## 2018-06-20 DIAGNOSIS — Z00.00 PREVENTATIVE HEALTH CARE: ICD-10-CM

## 2018-06-20 DIAGNOSIS — F32.9 REACTIVE DEPRESSION: ICD-10-CM

## 2018-06-20 DIAGNOSIS — S32.030D CLOSED COMPRESSION FRACTURE OF L3 LUMBAR VERTEBRA WITH ROUTINE HEALING, SUBSEQUENT ENCOUNTER: ICD-10-CM

## 2018-06-20 DIAGNOSIS — I44.4 LEFT ANTERIOR FASCICULAR BLOCK: ICD-10-CM

## 2018-06-20 LAB
25(OH)D3+25(OH)D2 SERPL-MCNC: 33.7 NG/ML
ALBUMIN SERPL-MCNC: 4.07 G/DL (ref 3.2–4.8)
ALBUMIN/GLOB SERPL: 1.7 G/DL (ref 1.5–2.5)
ALP SERPL-CCNC: 104 U/L (ref 25–100)
ALT SERPL-CCNC: 36 U/L (ref 7–40)
APPEARANCE UR: CLEAR
AST SERPL-CCNC: 33 U/L (ref 0–33)
BASOPHILS # BLD AUTO: 0.01 10*3/MM3 (ref 0–0.2)
BASOPHILS NFR BLD AUTO: 0.2 % (ref 0–1)
BILIRUB SERPL-MCNC: 0.4 MG/DL (ref 0.3–1.2)
BILIRUB UR QL STRIP: NEGATIVE
BUN SERPL-MCNC: 19 MG/DL (ref 9–23)
BUN/CREAT SERPL: 24.7 (ref 7–25)
CALCIUM SERPL-MCNC: 9.1 MG/DL (ref 8.7–10.4)
CHLORIDE SERPL-SCNC: 107 MMOL/L (ref 99–109)
CHOLEST SERPL-MCNC: 145 MG/DL (ref 0–200)
CO2 SERPL-SCNC: 29 MMOL/L (ref 20–31)
COLOR UR: YELLOW
CREAT SERPL-MCNC: 0.77 MG/DL (ref 0.6–1.3)
CRP SERPL-MCNC: 0.02 MG/DL (ref 0–1)
EOSINOPHIL # BLD AUTO: 0.05 10*3/MM3 (ref 0–0.3)
EOSINOPHIL NFR BLD AUTO: 1 % (ref 0–3)
ERYTHROCYTE [DISTWIDTH] IN BLOOD BY AUTOMATED COUNT: 14.1 % (ref 11.3–14.5)
GFR SERPLBLD CREATININE-BSD FMLA CKD-EPI: 73 ML/MIN/1.73
GFR SERPLBLD CREATININE-BSD FMLA CKD-EPI: 88 ML/MIN/1.73
GLOBULIN SER CALC-MCNC: 2.4 GM/DL
GLUCOSE SERPL-MCNC: 110 MG/DL (ref 70–100)
GLUCOSE UR QL: NEGATIVE
HBA1C MFR BLD: 5.9 % (ref 4.8–5.6)
HCT VFR BLD AUTO: 46 % (ref 34.5–44)
HDLC SERPL-MCNC: 40 MG/DL (ref 40–60)
HGB BLD-MCNC: 14.7 G/DL (ref 11.5–15.5)
HGB UR QL STRIP: NEGATIVE
IMM GRANULOCYTES # BLD: 0 10*3/MM3 (ref 0–0.03)
IMM GRANULOCYTES NFR BLD: 0 % (ref 0–0.6)
KETONES UR QL STRIP: NEGATIVE
LDLC SERPL CALC-MCNC: 69 MG/DL (ref 0–100)
LEUKOCYTE ESTERASE UR QL STRIP: NEGATIVE
LYMPHOCYTES # BLD AUTO: 1.7 10*3/MM3 (ref 0.6–4.8)
LYMPHOCYTES NFR BLD AUTO: 35.1 % (ref 24–44)
MCH RBC QN AUTO: 29.1 PG (ref 27–31)
MCHC RBC AUTO-ENTMCNC: 32 G/DL (ref 32–36)
MCV RBC AUTO: 90.9 FL (ref 80–99)
MONOCYTES # BLD AUTO: 0.4 10*3/MM3 (ref 0–1)
MONOCYTES NFR BLD AUTO: 8.3 % (ref 0–12)
NEUTROPHILS # BLD AUTO: 2.68 10*3/MM3 (ref 1.5–8.3)
NEUTROPHILS NFR BLD AUTO: 55.4 % (ref 41–71)
NITRITE UR QL STRIP: NEGATIVE
PH UR STRIP: 7.5 [PH] (ref 5–8)
PLATELET # BLD AUTO: 198 10*3/MM3 (ref 150–450)
POTASSIUM SERPL-SCNC: 5.5 MMOL/L (ref 3.5–5.5)
PROT SERPL-MCNC: 6.5 G/DL (ref 5.7–8.2)
PROT UR QL STRIP: NEGATIVE
RBC # BLD AUTO: 5.06 10*6/MM3 (ref 3.89–5.14)
SODIUM SERPL-SCNC: 143 MMOL/L (ref 132–146)
SP GR UR: 1.01 (ref 1–1.03)
TRIGL SERPL-MCNC: 179 MG/DL (ref 0–150)
TSH SERPL DL<=0.005 MIU/L-ACNC: 1.63 MIU/ML (ref 0.35–5.35)
UROBILINOGEN UR STRIP-MCNC: NORMAL MG/DL
VLDLC SERPL CALC-MCNC: 35.8 MG/DL
WBC # BLD AUTO: 4.84 10*3/MM3 (ref 3.5–10.8)

## 2018-06-20 PROCEDURE — 36415 COLL VENOUS BLD VENIPUNCTURE: CPT | Performed by: INTERNAL MEDICINE

## 2018-06-20 PROCEDURE — 99215 OFFICE O/P EST HI 40 MIN: CPT | Performed by: INTERNAL MEDICINE

## 2018-06-20 PROCEDURE — 93000 ELECTROCARDIOGRAM COMPLETE: CPT | Performed by: INTERNAL MEDICINE

## 2018-06-20 RX ORDER — CLONAZEPAM 0.5 MG/1
.25-.5 TABLET ORAL DAILY PRN
Qty: 10 TABLET | Refills: 0 | OUTPATIENT
Start: 2018-06-20 | End: 2018-08-02 | Stop reason: SDUPTHER

## 2018-06-20 NOTE — PROGRESS NOTES
Subjective   Joanne Tineo is a 78 y.o. female.     Chief Complaint   Patient presents with   • Panic Attack       History of Present Illness     The patient's had a greater than 30 year history of recurring clinical depression.  She had acute flares in 1984 and again in 2010 after the death of her .  She has done well on Lexapro.  She has had associated sleep disorder and has used clonazepam routinely for many years.  She stopped clonazepam use last year.  She took a trip to [place] and had poor adjustment to high altitude.  She developed intense anxiety and a panic attack this summer caring for her grandchild.  She lives independently but travels frequently with friends and family even internationally.  She has significant irritable bowel syndrome with GI distress at periods of intense stress.  She generally has done well with psyllium.    The following portions of the patient's history were reviewed and updated as appropriate: allergies, current medications, past family history, past medical history, past social history, past surgical history and problem list.    Active Ambulatory Problems     Diagnosis Date Noted   • Compression fracture of lumbar vertebra 05/18/2016   • Depression 05/18/2016   • Hearing impairment 05/18/2016   • Hyperlipidemia 05/18/2016   • Irritable bowel syndrome 05/18/2016   • Left anterior fascicular block 05/18/2016   • Macular degeneration 05/18/2016   • Dyssomnia 05/18/2016   • Vitamin B deficiency 05/18/2016   • Preventative health care 08/17/2016   • Hyperglycemia 12/05/2016   • Family history of heart disease 12/06/2016   • Vitamin D deficiency 10/05/2017     Resolved Ambulatory Problems     Diagnosis Date Noted   • Cardiac disease 05/18/2016   • Menopausal and postmenopausal disorder 05/18/2016   • Impaired glucose tolerance 05/18/2016   • Prediabetes 12/05/2016   • Acute non-recurrent maxillary sinusitis 03/21/2017   • Right otitis media 03/21/2017     Past Medical  History:   Diagnosis Date   • Chronic anxiety Adulthood   • Chronic lower back pain    • Depression    • Fracture of pelvis    • Hearing impairment    • Hyperlipidemia 2010   • Irritable bowel syndrome    • Macular degeneration    • Pneumonia 2016   • Post menopausal syndrome    • Prediabetes 2015   • Sleep disorder    • Vitamin D deficiency      Past Surgical History:   Procedure Laterality Date   • COLONOSCOPY  2016   • DIAGNOSTIC LAPAROSCOPY  1965    for infertility   • TONSILLECTOMY  1943     Family History   Problem Relation Age of Onset   • Heart attack Mother          age 84   • Coronary artery disease Father 79        CABG   • Dementia Father          age 90   • Colon cancer Brother 65         age 70   • Heart disease Other         Multiple family members    • Kidney cancer Other               Social History     Social History   • Marital status:      Spouse name: N/A   • Number of children: N/A   • Years of education: N/A     Occupational History   • Not on file.     Social History Main Topics   • Smoking status: Never Smoker   • Smokeless tobacco: Never Used   • Alcohol use 1.2 oz/week     2 Glasses of wine per week   • Drug use: No   • Sexual activity: Not on file     Other Topics Concern   • Not on file     Social History Narrative    Domestic life- Lives at home alone  -          [employer]- S[employer]        Sleep hygiene- in bed 10 PM to 6 AM for 7 or 8 hours - often broken - occasional use clonazepam.          Caffeine use- Drinks 2 cups of coffee daily        Exercise habits- Walks 30 minutes daily - pilates 2 days weekly  compliance irregular        Diet-   low calorie diabetic diet - low in salt low in sugar        Occupation- Retired teacher        Hearing : Corrects with hearing aids         Vision : Corrects with reading and distant vision glasses        Driving : No limitations             Review of Systems   Constitutional: Negative for  "appetite change and fatigue.   HENT: Negative for ear pain and sore throat.    Eyes: Negative for itching and visual disturbance.   Respiratory: Negative for cough and shortness of breath.    Cardiovascular: Negative for chest pain and palpitations.   Gastrointestinal: Negative for abdominal pain and nausea.   Endocrine: Negative for cold intolerance and heat intolerance.   Genitourinary: Negative for dysuria and hematuria.   Musculoskeletal: Positive for back pain. Negative for arthralgias.        Has worked with chiropractor this winter.   Skin: Negative for rash and wound.   Allergic/Immunologic: Negative for environmental allergies and food allergies.   Neurological: Negative for dizziness and headaches.   Hematological: Negative for adenopathy. Does not bruise/bleed easily.   Psychiatric/Behavioral: Negative for sleep disturbance. The patient is not nervous/anxious.         Panic attacks last month in [place] at high altitude with friends       Objective   Blood pressure 116/80, pulse 68, temperature 97.7 °F (36.5 °C), temperature source Oral, resp. rate 16, height 163.2 cm (64.25\"), weight 68.4 kg (150 lb 12.8 oz), SpO2 97 %.    Physical Exam   Constitutional: She is oriented to person, place, and time. She appears well-developed and well-nourished. No distress.   HENT:   Right Ear: External ear normal.   Left Ear: External ear normal.   Nose: Nose normal.   Mouth/Throat: Oropharynx is clear and moist.   Eyes: EOM are normal. Pupils are equal, round, and reactive to light. No scleral icterus.   Neck: Normal range of motion. Neck supple. No JVD present. No thyromegaly present.   Cardiovascular: Normal rate, regular rhythm, normal heart sounds and intact distal pulses.    No murmur heard.  Pulmonary/Chest: Effort normal and breath sounds normal. She has no wheezes. She has no rales.   Abdominal: Soft. Bowel sounds are normal. She exhibits no mass. There is no tenderness.   Genitourinary:   Genitourinary " Comments: Deferred   Musculoskeletal: Normal range of motion. She exhibits no edema or tenderness.   Lymphadenopathy:     She has no cervical adenopathy.   Neurological: She is alert and oriented to person, place, and time. She displays normal reflexes. No cranial nerve deficit or sensory deficit. She exhibits normal muscle tone. Coordination normal.   Vibratory normal  Romberg negative  Gait normal  Plantars downgoing     Skin: Skin is warm and dry. No rash noted.   Psychiatric: She has a normal mood and affect. Her behavior is normal. Judgment and thought content normal.   Nursing note and vitals reviewed.      ECG 12 Lead  Date/Time: 6/20/2018 9:25 AM  Performed by: YANG COTA  Authorized by: YANG COTA   Interpreted by ED physician:  Yang Cota M.D.  Comparison: compared with previous ECG from 6/19/2017  Similar to previous ECG  Rhythm: sinus rhythm  Rate: normal  BPM: 65  Conduction: LAFB  ST Segments: ST segments normal  T Waves: T waves normal  QRS axis: left  Other: no other findings  Clinical impression: normal ECG  Comments: Indication - [facility]  Baseline EKG          Assessment/Plan   Joanne was seen today for panic attack.    Diagnoses and all orders for this visit:    Pure hypercholesterolemia  -     Comprehensive Metabolic Panel  -     Lipid Panel    Left anterior fascicular block  -     ECG 12 Lead    Irritable bowel syndrome without diarrhea  -     CBC & Differential  -     C-reactive Protein  -     Urinalysis With / Microscopic If Indicated (No Culture) - Urine, Clean Catch    Vitamin D deficiency  -     Vitamin D 25 Hydroxy    Closed compression fracture of L3 lumbar vertebra with routine healing, subsequent encounter    Reactive depression    Dyssomnia  -     TSH    Hyperglycemia  -     Hemoglobin A1c    Preventative health care    Other orders  -     clonazePAM (KlonoPIN) 0.5 MG tablet; Take 0.5-1 tablets by mouth Daily As Needed for Anxiety.      The patient has a severe  "chronic emotional disorder with intermittent flares of depression and anxiety.  Her sleep disorder has in fact improved in recent years and she no longer takes routine benzodiazepines.  She had a severe flare of anxiety and panic with family in [place].  I've given her a few extra clonazepam to help her at these peak times of excess anxiety.    Patient has chronic back pain and routinely exercises.  I've asked her to develop a more dedicated program and eventually work with physical therapist if needed.    The patient has moderately severe hyperlipidemia with a total cholesterol greater than 300 and she years.  Her LDL cholesterol is now excellent at 69 on high-dose atorvastatin.  Both parents  with coronary artery disease.  I've asked the patient to stay on aspirin for primary prevention.    The patient has significant irritable bowel syndrome which has been greatly improved with routine psyllium.  I've asked her to eat a well-balanced diet and avoid excessively rich and spicy foods.    Patient Instructions   1.  Continue usual medicines and supplements - as listed.    2.  Use clonazepam - 1/2-1 tablet only rarely - for severe anxiety.    3.  Obtain the book \" treat your own back \" - slowly implement guidelines over 30 days.    4.  Follow low-calorie diabetic diet - low in salt low in sugar.    5.  Next checkup in 4 months - with new physician.    6.  Blood glucose elevated 110 - hemoglobin A1c elevated 5.9 - follow a low-calorie diabetic diet for weight loss - prevent diabetes.    7.  LDL cholesterol excellent at 69.    8.  Other laboratory tests are acceptable and require no change in treatment.    Current Outpatient Prescriptions:   •  aspirin 81 MG tablet, Take 1 tablet by mouth. 3-7 days weekly , Disp: , Rfl:   •  atorvastatin (LIPITOR) 80 MG tablet, Take 1 tablet by mouth Every Night., Disp: 90 tablet, Rfl: 1  •  calcium-vitamin D (OSCAL 500/200 D-3) 500-200 MG-UNIT per tablet, Take 1 tablet by mouth " daily., Disp: , Rfl:   •  Cholecalciferol (VITAMIN D3) 2000 UNITS tablet, Take 1 tablet by mouth Daily., Disp: , Rfl:   •  clonazePAM (KlonoPIN) 0.5 MG tablet, Take 0.5-1 tablets by mouth Daily As Needed for Anxiety., Disp: 10 tablet, Rfl: 0  •  Coenzyme Q10 (CO Q-10) 200 MG capsule, Take 1 capsule by mouth daily., Disp: , Rfl:   •  escitalopram (LEXAPRO) 20 MG tablet, Take 0.5 tablets by mouth Daily. Take total of 15 mg daily.  This EMR will not accept this dose., Disp: 135 tablet, Rfl: 3  •  Multiple Vitamins-Minerals (PRESERVISION AREDS 2) capsule, Take 1 capsule by mouth daily., Disp: , Rfl:   •  Multiple Vitamins-Minerals (WOMENS MULTI VITAMIN & MINERAL) tablet, Take 1 tablet by mouth daily., Disp: , Rfl:   •  Omega-3 Fatty Acids (FISH OIL) 1000 MG capsule capsule, Take 1 capsule by mouth daily., Disp: , Rfl:   •  psyllium (METAMUCIL) 0.52 G capsule, Take 3 capsules by mouth daily., Disp: , Rfl:     No Known Allergies    Immunization History   Administered Date(s) Administered   • Flu Vaccine High Dose PF 65YR+ 10/05/2017   • Influenza, Quadrivalent 11/02/2015   • Pneumococcal Polysaccharide (PPSV23) 01/01/2006, 11/02/2015   • Td 01/01/2006   • Tdap 08/16/2012   • Zostavax 01/01/2015   • influenza Split 12/05/2016     Electronically signed Yang Cota M.D.6/23/2018 2:03 PM

## 2018-06-20 NOTE — PATIENT INSTRUCTIONS
"1.  Continue usual medicines and supplements - as listed.    2.  Use clonazepam - 1/2-1 tablet only rarely - for severe anxiety.    3.  Obtain the book \" treat your own back \" - slowly implement guidelines over 30 days.    4.  Follow low-calorie diabetic diet - low in salt low in sugar.    5.  Next checkup in 4 months - with new physician.  "

## 2018-06-25 ENCOUNTER — TELEPHONE (OUTPATIENT)
Dept: INTERNAL MEDICINE | Facility: CLINIC | Age: 78
End: 2018-06-25

## 2018-06-25 NOTE — TELEPHONE ENCOUNTER
Called labs to pt. Per TGF:  FBG elevated 110 - HA1c elevated 5.9 - follow a low-calorie diabetic diet for weight loss - prevent diabetes.  LDL excellent at 69.  Other lab tests are acceptable and require no change in treatment.  Pt verb understanding.

## 2018-08-02 NOTE — TELEPHONE ENCOUNTER
Med Refills:  She needs enough refills to last until she sees Dr. Thorpe on 11/19:  Clonazepam (almost out), Lexapro and atorvastatin.    [facility]

## 2018-08-02 NOTE — TELEPHONE ENCOUNTER
Last fill: 6/20/18  Last office visit: 6/20/18  Next office visit: 11/19/18 Maurilio Machado: updated on your desk  Controlled substance contract on file? Yes need updated  Last UDS:  None to date

## 2018-08-03 RX ORDER — ATORVASTATIN CALCIUM 80 MG/1
80 TABLET, FILM COATED ORAL NIGHTLY
Qty: 90 TABLET | Refills: 1 | Status: SHIPPED | OUTPATIENT
Start: 2018-08-03 | End: 2018-12-03 | Stop reason: SDUPTHER

## 2018-08-03 RX ORDER — ESCITALOPRAM OXALATE 20 MG/1
10 TABLET ORAL DAILY
Qty: 135 TABLET | Refills: 3 | Status: SHIPPED | OUTPATIENT
Start: 2018-08-03 | End: 2018-12-03 | Stop reason: SDUPTHER

## 2018-08-03 RX ORDER — CLONAZEPAM 0.5 MG/1
.25-.5 TABLET ORAL DAILY PRN
Qty: 10 TABLET | Refills: 0 | Status: SHIPPED | OUTPATIENT
Start: 2018-08-03 | End: 2018-12-03 | Stop reason: SDUPTHER

## 2018-12-03 ENCOUNTER — OFFICE VISIT (OUTPATIENT)
Dept: INTERNAL MEDICINE | Facility: CLINIC | Age: 78
End: 2018-12-03

## 2018-12-03 VITALS
DIASTOLIC BLOOD PRESSURE: 78 MMHG | SYSTOLIC BLOOD PRESSURE: 118 MMHG | TEMPERATURE: 97.4 F | HEIGHT: 64 IN | WEIGHT: 148.2 LBS | BODY MASS INDEX: 25.3 KG/M2

## 2018-12-03 DIAGNOSIS — G47.9 DYSSOMNIA: ICD-10-CM

## 2018-12-03 DIAGNOSIS — F41.9 ANXIETY: ICD-10-CM

## 2018-12-03 DIAGNOSIS — E78.00 PURE HYPERCHOLESTEROLEMIA: Primary | ICD-10-CM

## 2018-12-03 DIAGNOSIS — F32.9 REACTIVE DEPRESSION: ICD-10-CM

## 2018-12-03 PROCEDURE — 99214 OFFICE O/P EST MOD 30 MIN: CPT | Performed by: INTERNAL MEDICINE

## 2018-12-03 RX ORDER — CLONAZEPAM 0.5 MG/1
.25-.5 TABLET ORAL DAILY PRN
Qty: 10 TABLET | Refills: 0 | Status: SHIPPED | OUTPATIENT
Start: 2018-12-03 | End: 2019-03-25 | Stop reason: SDUPTHER

## 2018-12-03 RX ORDER — ESCITALOPRAM OXALATE 20 MG/1
10 TABLET ORAL DAILY
Qty: 45 TABLET | Refills: 2 | Status: SHIPPED | OUTPATIENT
Start: 2018-12-03 | End: 2019-03-03

## 2018-12-03 RX ORDER — ATORVASTATIN CALCIUM 80 MG/1
80 TABLET, FILM COATED ORAL NIGHTLY
Qty: 90 TABLET | Refills: 1 | Status: SHIPPED | OUTPATIENT
Start: 2018-12-03 | End: 2019-10-23 | Stop reason: SDUPTHER

## 2018-12-03 NOTE — PROGRESS NOTES
"Subjective   Joanne Tineo is a 78 y.o. female here for follow-up HLD, depression, anxiety. HLD: has familial hypercholesterolemia but lipids have been well-controlled on statin. Denies any issue with med. Last FLP at goal with LDL ~70. Depression: pt denies any depressive sx. Does desire to continue on with lexapro. She started it when her  was dying of colon ca and it has put her depression into remission since then. She takes klonopin infrequently/rarely when she has periods of high anxiety. Denies daily anxiety. She feels well today and denies any acute issues. Had flu vaccine this year. She has not had a mammo in 3 years.      The following portions of the patient's history were reviewed and updated as appropriate: allergies, current medications, past medical history, past social history and problem list.    Review of Systems:  General: negative  CV: negative  MSK: negative  Neuro: negative  Psych: see hpi    Objective   /78 (BP Location: Left arm, Patient Position: Sitting, Cuff Size: Adult)   Temp 97.4 °F (36.3 °C) (Temporal)   Ht 163.2 cm (64.25\")   Wt 67.2 kg (148 lb 3.2 oz)   BMI 25.24 kg/m²     Physical Exam   Constitutional: She is oriented to person, place, and time. She appears well-developed and well-nourished.   Cardiovascular: Normal rate, regular rhythm and normal heart sounds.   Pulmonary/Chest: Effort normal and breath sounds normal. She has no wheezes. She has no rales.   Neurological: She is alert and oriented to person, place, and time.   Skin: Skin is warm and dry.   Psychiatric: She has a normal mood and affect. Her behavior is normal. Thought content normal.   Vitals reviewed.      Assessment/Plan   Joanne was seen today for establish care.    Diagnoses and all orders for this visit:    Pure hypercholesterolemia  -     atorvastatin (LIPITOR) 80 MG tablet; Take 1 tablet by mouth Every Night.    Reactive depression  -     escitalopram (LEXAPRO) 20 MG tablet; Take 0.5 " tablets by mouth Daily for 90 days.  -      Discussed stopping med; pt prefer to continue as her mood is very stable and controlled    Dyssomnia  -klonopin PRN    Anxiety  -     clonazePAM (KlonoPIN) 0.5 MG tablet; Take 0.5-1 tablets by mouth Daily As Needed for Anxiety.  -     Rare use of BDZ

## 2019-03-25 ENCOUNTER — OFFICE VISIT (OUTPATIENT)
Dept: INTERNAL MEDICINE | Facility: CLINIC | Age: 79
End: 2019-03-25

## 2019-03-25 VITALS
WEIGHT: 154 LBS | SYSTOLIC BLOOD PRESSURE: 120 MMHG | BODY MASS INDEX: 26.29 KG/M2 | HEIGHT: 64 IN | DIASTOLIC BLOOD PRESSURE: 74 MMHG | TEMPERATURE: 98.5 F

## 2019-03-25 DIAGNOSIS — E55.9 VITAMIN D DEFICIENCY: ICD-10-CM

## 2019-03-25 DIAGNOSIS — Z00.00 MEDICARE ANNUAL WELLNESS VISIT, SUBSEQUENT: Primary | ICD-10-CM

## 2019-03-25 DIAGNOSIS — R73.9 HYPERGLYCEMIA: ICD-10-CM

## 2019-03-25 DIAGNOSIS — F41.9 ANXIETY: ICD-10-CM

## 2019-03-25 DIAGNOSIS — E78.00 PURE HYPERCHOLESTEROLEMIA: ICD-10-CM

## 2019-03-25 PROCEDURE — G0439 PPPS, SUBSEQ VISIT: HCPCS | Performed by: INTERNAL MEDICINE

## 2019-03-25 PROCEDURE — 96160 PT-FOCUSED HLTH RISK ASSMT: CPT | Performed by: INTERNAL MEDICINE

## 2019-03-25 PROCEDURE — 99397 PER PM REEVAL EST PAT 65+ YR: CPT | Performed by: INTERNAL MEDICINE

## 2019-03-25 RX ORDER — CLONAZEPAM 0.5 MG/1
.25-.5 TABLET ORAL DAILY PRN
Qty: 10 TABLET | Refills: 5 | Status: SHIPPED | OUTPATIENT
Start: 2019-03-25 | End: 2019-10-18 | Stop reason: SDUPTHER

## 2019-03-25 NOTE — PATIENT INSTRUCTIONS
Medicare Wellness  Personal Prevention Plan of Service     Date of Office Visit:  2019  Encounter Provider:  Lynda Landrum MD  Place of Service:  [facility]  Patient Name: Joanne Tineo  :  1940    As part of the Medicare Wellness portion of your visit today, we are providing you with this personalized preventive plan of services (PPPS). This plan is based upon recommendations of the [place] Preventive Services Task Force (USPSTF) and the Advisory Committee on Immunization Practices (ACIP).    This lists the preventive care services that should be considered, and provides dates of when you are due. Items listed as completed are up-to-date and do not require any further intervention.    Health Maintenance   Topic Date Due   • ZOSTER VACCINE (2 of 3) 2015   • PNEUMOCOCCAL VACCINES (65+ LOW/MEDIUM RISK) (2 of 2 - PCV13) 2016   • MAMMOGRAM  2017   • LIPID PANEL  2019   • MEDICARE ANNUAL WELLNESS  2020   • COLONOSCOPY  2021   • TDAP/TD VACCINES (2 - Td) 2022   • INFLUENZA VACCINE  Completed       Orders Placed This Encounter   Procedures   • Comprehensive Metabolic Panel   • Hemoglobin A1c   • Lipid Panel   • Vitamin D 25 Hydroxy       No Follow-up on file.

## 2019-03-25 NOTE — PROGRESS NOTES
QUICK REFERENCE INFORMATION:  The ABCs of the Annual Wellness Visit    Subsequent Medicare Wellness Visit    HEALTH RISK ASSESSMENT    1940    Recent Hospitalizations:  No hospitalization(s) within the last year..        Current Medical Providers:  Patient Care Team:  Lynda Landrum MD as PCP - General (Internal Medicine)  Yang Cota MD as PCP - Family Medicine        Smoking Status:  Social History     Tobacco Use   Smoking Status Never Smoker   Smokeless Tobacco Never Used       Alcohol Consumption:  Social History     Substance and Sexual Activity   Alcohol Use Yes   • Alcohol/week: 1.2 oz   • Types: 2 Glasses of wine per week       Depression Screen:   PHQ-2/PHQ-9 Depression Screening 3/25/2019   Little interest or pleasure in doing things 0   Feeling down, depressed, or hopeless 0   Trouble falling or staying asleep, or sleeping too much 0   Feeling tired or having little energy 0   Poor appetite or overeating 0   Feeling bad about yourself - or that you are a failure or have let yourself or your family down 0   Trouble concentrating on things, such as reading the newspaper or watching television 0   Moving or speaking so slowly that other people could have noticed. Or the opposite - being so fidgety or restless that you have been moving around a lot more than usual 0   Thoughts that you would be better off dead, or of hurting yourself in some way 0   Total Score 0       Health Habits and Functional and Cognitive Screening:  Functional & Cognitive Status 3/25/2019   Do you have difficulty preparing food and eating? No   Do you have difficulty bathing yourself, getting dressed or grooming yourself? No   Do you have difficulty using the toilet? No   Do you have difficulty moving around from place to place? No   Do you have trouble with steps or getting out of a bed or a chair? No   In the past year have you fallen or experienced a near fall? No   Current Diet Well Balanced Diet   Dental  Exam Up to date   Eye Exam Up to date   Exercise (times per week) 7 times per week   Current Exercise Activities Include Walking   Do you need help using the phone?  No   Are you deaf or do you have serious difficulty hearing?  No   Do you need help with transportation? No   Do you need help shopping? No   Do you need help preparing meals?  No   Do you need help with housework?  No   Do you need help with laundry? No   Do you need help taking your medications? No   Do you need help managing money? No   Do you ever drive or ride in a car without wearing a seat belt? No   Have you felt unusual stress, anger or loneliness in the last month? No   Who do you live with? Alone   If you need help, do you have trouble finding someone available to you? No   Have you been bothered in the last four weeks by sexual problems? No   Do you have difficulty concentrating, remembering or making decisions? No           Does the patient have evidence of cognitive impairment? No    Aspirin use counseling: Taking ASA appropriately as indicated      Recent Lab Results:  CMP:  Lab Results   Component Value Date     (H) 06/20/2018    BUN 19 06/20/2018    CREATININE 0.77 06/20/2018    EGFRIFNONA 73 06/20/2018    EGFRIFAFRI 88 06/20/2018    BCR 24.7 06/20/2018     06/20/2018    K 5.5 06/20/2018    CO2 29.0 06/20/2018    CALCIUM 9.1 06/20/2018    PROTENTOTREF 6.5 06/20/2018    ALBUMIN 4.07 06/20/2018    LABGLOBREF 2.4 06/20/2018    LABIL2 1.7 06/20/2018    BILITOT 0.4 06/20/2018    ALKPHOS 104 (H) 06/20/2018    AST 33 06/20/2018    ALT 36 06/20/2018     Lipid Panel:  Lab Results   Component Value Date    TRIG 179 (H) 06/20/2018    HDL 40 06/20/2018    VLDL 35.8 06/20/2018     HbA1c:  Lab Results   Component Value Date    HGBA1C 5.90 (H) 06/20/2018       Visual Acuity:  No exam data present    Age-appropriate Screening Schedule:  Refer to the list below for future screening recommendations based on patient's age, sex and/or medical  conditions. Orders for these recommended tests are listed in the plan section. The patient has been provided with a written plan.    Health Maintenance   Topic Date Due   • ZOSTER VACCINE (2 of 3) 02/26/2015   • PNEUMOCOCCAL VACCINES (65+ LOW/MEDIUM RISK) (2 of 2 - PCV13) 11/02/2016   • MAMMOGRAM  05/14/2017   • LIPID PANEL  06/20/2019   • COLONOSCOPY  08/11/2021   • TDAP/TD VACCINES (2 - Td) 08/16/2022   • INFLUENZA VACCINE  Completed        Subjective   History of Present Illness    Joanne Tineo is a 78 y.o. female who presents for an Subsequent Wellness Visit. She has no complaints today. She is also here to f/u HLD, hyperglycemia, vit D deficiency with some lab work. She is feeling well.    The following portions of the patient's history were reviewed and updated as appropriate: allergies, current medications, past family history, past medical history, past social history, past surgical history and problem list.    Outpatient Medications Prior to Visit   Medication Sig Dispense Refill   • aspirin 81 MG tablet Take 1 tablet by mouth. 3-7 days weekly      • atorvastatin (LIPITOR) 80 MG tablet Take 1 tablet by mouth Every Night. 90 tablet 1   • calcium-vitamin D (OSCAL 500/200 D-3) 500-200 MG-UNIT per tablet Take 1 tablet by mouth daily.     • Cholecalciferol (VITAMIN D3) 2000 UNITS tablet Take 1 tablet by mouth Daily.     • clonazePAM (KlonoPIN) 0.5 MG tablet Take 0.5-1 tablets by mouth Daily As Needed for Anxiety. 10 tablet 0   • Coenzyme Q10 (CO Q-10) 200 MG capsule Take 1 capsule by mouth daily.     • Multiple Vitamins-Minerals (PRESERVISION AREDS 2) capsule Take 1 capsule by mouth daily.     • Multiple Vitamins-Minerals (WOMENS MULTI VITAMIN & MINERAL) tablet Take 1 tablet by mouth daily.     • Omega-3 Fatty Acids (FISH OIL) 1000 MG capsule capsule Take 1 capsule by mouth daily.     • psyllium (METAMUCIL) 0.52 G capsule Take 3 capsules by mouth daily.       No facility-administered medications prior to  visit.        Patient Active Problem List   Diagnosis   • Compression fracture of lumbar vertebra (CMS/HCC)   • Depression   • Hearing impairment   • Hyperlipidemia   • Irritable bowel syndrome   • Left anterior fascicular block   • Macular degeneration   • Dyssomnia   • Vitamin B deficiency   • Hyperglycemia   • Family history of heart disease   • Vitamin D deficiency       Advance Care Planning:  has an advance directive - a copy HAS NOT been provided    Identification of Risk Factors:  Risk factors include: cardiovascular risk.    Review of Systems   Constitutional: Negative.    HENT: Negative.    Eyes: Negative.    Respiratory: Negative.    Cardiovascular: Negative.    Gastrointestinal: Negative.    Endocrine: Negative.    Genitourinary: Negative.    Musculoskeletal: Negative.    Skin: Negative.    Allergic/Immunologic: Negative.    Neurological: Negative.    Hematological: Negative.    Psychiatric/Behavioral: Negative.        Compared to one year ago, the patient feels her physical health is the same.  Compared to one year ago, the patient feels her mental health is the same.    Objective     Physical Exam   Constitutional: She is oriented to person, place, and time. She appears well-developed and well-nourished. No distress.   HENT:   Head: Normocephalic and atraumatic.   Right Ear: Tympanic membrane, external ear and ear canal normal.   Left Ear: Tympanic membrane, external ear and ear canal normal.   Nose: Nose normal.   Mouth/Throat: Oropharynx is clear and moist.   Eyes: Conjunctivae and lids are normal. Pupils are equal, round, and reactive to light. No scleral icterus.   Neck: Neck supple. Carotid bruit is not present. No thyroid mass and no thyromegaly present.   Cardiovascular: Normal rate, regular rhythm, normal heart sounds and intact distal pulses. Exam reveals no gallop, no S3, no S4 and no friction rub.   No murmur heard.  Pulmonary/Chest: Effort normal and breath sounds normal. No respiratory  "distress. She has no wheezes. She has no rhonchi. She has no rales.   Abdominal: Soft. Bowel sounds are normal. She exhibits no distension and no mass. There is no hepatosplenomegaly. There is no tenderness.   Musculoskeletal: Normal range of motion.   Lymphadenopathy:     She has no cervical adenopathy.   Neurological: She is alert and oriented to person, place, and time. No cranial nerve deficit or sensory deficit.   Skin: Skin is warm and dry. No rash noted. No erythema. No pallor.   Psychiatric: She has a normal mood and affect. Her speech is normal and behavior is normal. Judgment and thought content normal. Cognition and memory are normal.   Vitals reviewed.      Vitals:    03/25/19 1421   BP: 120/74   BP Location: Left arm   Patient Position: Sitting   Cuff Size: Adult   Temp: 98.5 °F (36.9 °C)   TempSrc: Temporal   Weight: 69.9 kg (154 lb)   Height: 163.2 cm (64.25\")   PainSc: 0-No pain       Patient's Body mass index is 26.23 kg/m². BMI is within normal parameters. No follow-up required..      Assessment/Plan   Patient Self-Management and Personalized Health Advice  The patient has been provided with information about: weight management and preventive services including:   · Fall Risk assessment done, Pneumococcal vaccine , Screening mammography, referral placed, TdaP vaccine, shingrix.    Visit Diagnoses:    ICD-10-CM ICD-9-CM   1. Medicare annual wellness visit, subsequent Z00.00 V70.0   2. Anxiety F41.9 300.00   3. Pure hypercholesterolemia E78.00 272.0   4. Vitamin D deficiency E55.9 268.9   5. Hyperglycemia R73.9 790.29       Orders Placed This Encounter   Procedures   • Comprehensive Metabolic Panel   • Hemoglobin A1c   • Lipid Panel   • Vitamin D 25 Hydroxy     -rec shingrix at pharmacy  -documented PPSV23 given, pt unsure about prevnar 13, will look through records  -pt to schedule mammo  -all other vaccines UTD  -no pap 2/2 age  -cscope UTD  -labs for chronic conditions    Outpatient Encounter " Medications as of 3/25/2019   Medication Sig Dispense Refill   • aspirin 81 MG tablet Take 1 tablet by mouth. 3-7 days weekly      • atorvastatin (LIPITOR) 80 MG tablet Take 1 tablet by mouth Every Night. 90 tablet 1   • calcium-vitamin D (OSCAL 500/200 D-3) 500-200 MG-UNIT per tablet Take 1 tablet by mouth daily.     • Cholecalciferol (VITAMIN D3) 2000 UNITS tablet Take 1 tablet by mouth Daily.     • clonazePAM (KlonoPIN) 0.5 MG tablet Take 0.5-1 tablets by mouth Daily As Needed for Anxiety. 10 tablet 0   • Coenzyme Q10 (CO Q-10) 200 MG capsule Take 1 capsule by mouth daily.     • Multiple Vitamins-Minerals (PRESERVISION AREDS 2) capsule Take 1 capsule by mouth daily.     • Multiple Vitamins-Minerals (WOMENS MULTI VITAMIN & MINERAL) tablet Take 1 tablet by mouth daily.     • Omega-3 Fatty Acids (FISH OIL) 1000 MG capsule capsule Take 1 capsule by mouth daily.     • psyllium (METAMUCIL) 0.52 G capsule Take 3 capsules by mouth daily.       No facility-administered encounter medications on file as of 3/25/2019.        Reviewed use of high risk medication in the elderly: yes  Reviewed for potential of harmful drug interactions in the elderly: yes    Follow Up:  No Follow-up on file.     An After Visit Summary and PPPS with all of these plans were given to the patient.

## 2019-03-26 LAB
25(OH)D3+25(OH)D2 SERPL-MCNC: 42.5 NG/ML
ALBUMIN SERPL-MCNC: 4.15 G/DL (ref 3.2–4.8)
ALBUMIN/GLOB SERPL: 1.9 G/DL (ref 1.5–2.5)
ALP SERPL-CCNC: 137 U/L (ref 25–100)
ALT SERPL-CCNC: 70 U/L (ref 7–40)
AST SERPL-CCNC: 33 U/L (ref 0–33)
BILIRUB SERPL-MCNC: 0.8 MG/DL (ref 0.3–1.2)
BUN SERPL-MCNC: 23 MG/DL (ref 9–23)
BUN/CREAT SERPL: 35.9 (ref 7–25)
CALCIUM SERPL-MCNC: 8.9 MG/DL (ref 8.7–10.4)
CHLORIDE SERPL-SCNC: 104 MMOL/L (ref 99–109)
CHOLEST SERPL-MCNC: 173 MG/DL (ref 0–200)
CO2 SERPL-SCNC: 28 MMOL/L (ref 20–31)
CREAT SERPL-MCNC: 0.64 MG/DL (ref 0.6–1.3)
GLOBULIN SER CALC-MCNC: 2.2 GM/DL
GLUCOSE SERPL-MCNC: 87 MG/DL (ref 70–100)
HBA1C MFR BLD: 5.8 % (ref 4.8–5.6)
HDLC SERPL-MCNC: 46 MG/DL (ref 40–60)
LDLC SERPL CALC-MCNC: 85 MG/DL (ref 0–100)
POTASSIUM SERPL-SCNC: 4.3 MMOL/L (ref 3.5–5.5)
PROT SERPL-MCNC: 6.3 G/DL (ref 5.7–8.2)
SODIUM SERPL-SCNC: 141 MMOL/L (ref 132–146)
TRIGL SERPL-MCNC: 209 MG/DL (ref 0–150)
VLDLC SERPL CALC-MCNC: 41.8 MG/DL

## 2019-08-15 ENCOUNTER — TRANSCRIBE ORDERS (OUTPATIENT)
Dept: ADMINISTRATIVE | Facility: HOSPITAL | Age: 79
End: 2019-08-15

## 2019-08-15 DIAGNOSIS — Z12.31 VISIT FOR SCREENING MAMMOGRAM: Primary | ICD-10-CM

## 2019-09-03 ENCOUNTER — OFFICE VISIT (OUTPATIENT)
Dept: INTERNAL MEDICINE | Facility: CLINIC | Age: 79
End: 2019-09-03

## 2019-09-03 VITALS
TEMPERATURE: 96.9 F | HEIGHT: 66 IN | RESPIRATION RATE: 18 BRPM | DIASTOLIC BLOOD PRESSURE: 84 MMHG | HEART RATE: 89 BPM | SYSTOLIC BLOOD PRESSURE: 120 MMHG | OXYGEN SATURATION: 97 % | WEIGHT: 144.13 LBS | BODY MASS INDEX: 23.16 KG/M2

## 2019-09-03 DIAGNOSIS — T14.8XXA INFECTED SKIN TEAR: ICD-10-CM

## 2019-09-03 DIAGNOSIS — L08.9 INFECTED SKIN TEAR: ICD-10-CM

## 2019-09-03 DIAGNOSIS — L03.116 CELLULITIS OF LEFT LOWER EXTREMITY: Primary | ICD-10-CM

## 2019-09-03 PROCEDURE — 99213 OFFICE O/P EST LOW 20 MIN: CPT | Performed by: NURSE PRACTITIONER

## 2019-09-03 RX ORDER — DOXYCYCLINE 100 MG/1
100 CAPSULE ORAL 2 TIMES DAILY
Qty: 20 CAPSULE | Refills: 0 | Status: SHIPPED | OUTPATIENT
Start: 2019-09-03 | End: 2019-09-13

## 2019-09-03 NOTE — PROGRESS NOTES
Subjective   Chief Complaint   Patient presents with   • Laceration     left leg x1 week      Joanne Tineo is a 79 y.o. female here today for skin laceration.  She accidentally scraped her left leg on a step 5 days ago.  This caused a large skin tear with bleeding.  She went to urgent care center and area was cleaned and Steri-Stripped.  She has been keeping this covered with Band-Aid but is now experiencing some redness and discomfort in this area.  She denies any leg pain.  She is been afebrile.  She denies any shortness of breath or chest pain.    I have reviewed the following portions of the patient's history and confirmed they are accurate: allergies, current medications, past family history, past medical history, past social history, past surgical history, problem list and ROS     I have personally completed the patient's review of systems.    Review of Systems   Constitutional: Negative for activity change, appetite change, chills, diaphoresis, fatigue, fever, unexpected weight gain and unexpected weight loss.   HENT: Negative for ear discharge, ear pain, mouth sores, nosebleeds, sinus pressure, sneezing and sore throat.    Eyes: Negative for pain, discharge and itching.   Respiratory: Negative for cough, chest tightness, shortness of breath and wheezing.    Cardiovascular: Negative for chest pain, palpitations and leg swelling.   Gastrointestinal: Negative for abdominal pain, constipation, diarrhea, nausea and vomiting.   Endocrine: Negative for heat intolerance, polydipsia and polyphagia.   Genitourinary: Negative for dysuria, flank pain, frequency, hematuria and urgency.   Musculoskeletal: Positive for arthralgias. Negative for back pain, gait problem, joint swelling, myalgias, neck pain and neck stiffness.   Skin: Positive for color change and skin lesions. Negative for pallor and rash.   Allergic/Immunologic: Negative for immunocompromised state.   Neurological: Negative for seizures, speech  "difficulty, weakness and numbness.   Hematological: Negative for adenopathy.   Psychiatric/Behavioral: Negative for agitation, decreased concentration, sleep disturbance and depressed mood. The patient is not nervous/anxious.        Current Outpatient Medications on File Prior to Visit   Medication Sig   • aspirin 81 MG tablet Take 1 tablet by mouth. 3-7 days weekly    • atorvastatin (LIPITOR) 80 MG tablet Take 1 tablet by mouth Every Night.   • calcium-vitamin D (OSCAL 500/200 D-3) 500-200 MG-UNIT per tablet Take 1 tablet by mouth daily.   • Cholecalciferol (VITAMIN D3) 2000 UNITS tablet Take 1 tablet by mouth Daily.   • clonazePAM (KlonoPIN) 0.5 MG tablet Take 0.5-1 tablets by mouth Daily As Needed for Anxiety.   • Coenzyme Q10 (CO Q-10) 200 MG capsule Take 1 capsule by mouth daily.   • Multiple Vitamins-Minerals (PRESERVISION AREDS 2) capsule Take 1 capsule by mouth daily.   • Multiple Vitamins-Minerals (WOMENS MULTI VITAMIN & MINERAL) tablet Take 1 tablet by mouth daily.   • Omega-3 Fatty Acids (FISH OIL) 1000 MG capsule capsule Take 1 capsule by mouth daily.   • psyllium (METAMUCIL) 0.52 G capsule Take 3 capsules by mouth daily.     No current facility-administered medications on file prior to visit.        Objective   Vitals:    09/03/19 1011   BP: 120/84   BP Location: Left arm   Patient Position: Sitting   Cuff Size: Adult   Pulse: 89   Resp: 18   Temp: 96.9 °F (36.1 °C)   TempSrc: Temporal   SpO2: 97%   Weight: 65.4 kg (144 lb 2 oz)   Height: 167.6 cm (66\")   PainSc: 0-No pain     Body mass index is 23.26 kg/m².    Physical Exam   Constitutional: She is oriented to person, place, and time. She appears well-developed and well-nourished.   HENT:   Head: Normocephalic and atraumatic.   Nose: Nose normal.   Eyes: EOM are normal. Pupils are equal, round, and reactive to light.   Neck: Trachea normal and normal range of motion. No thyromegaly present.   Cardiovascular: Normal rate, regular rhythm and normal heart " sounds.   Pulmonary/Chest: Effort normal and breath sounds normal.   Abdominal: Soft. Bowel sounds are normal. There is no tenderness.   Musculoskeletal: Normal range of motion.   Neurological: She is alert and oriented to person, place, and time. She has normal strength. GCS eye subscore is 4. GCS verbal subscore is 5. GCS motor subscore is 6.   Skin: Skin is warm and dry. Capillary refill takes 2 to 3 seconds. Turgor is normal. Abrasion and laceration noted. No lesion noted. There is erythema.        Large left lower extremity skin tear with multiple Steri-Strips intact.  Topical skin is darkened with redness surrounding the area.  Area radiates heat and is tender.   Psychiatric: She has a normal mood and affect. Her speech is normal and behavior is normal. Thought content normal. Cognition and memory are normal.   Vitals reviewed.      Assessment/Plan   Joanne was seen today for laceration.    Diagnoses and all orders for this visit:    Cellulitis of left lower extremity - new onset issue requiring medication management and monitoring.  -     doxycycline (MONODOX) 100 MG capsule; Take 1 capsule by mouth 2 (Two) Times a Day for 10 days.    Infected skin tear  -     doxycycline (MONODOX) 100 MG capsule; Take 1 capsule by mouth 2 (Two) Times a Day for 10 days.           Current Outpatient Medications:   •  aspirin 81 MG tablet, Take 1 tablet by mouth. 3-7 days weekly , Disp: , Rfl:   •  atorvastatin (LIPITOR) 80 MG tablet, Take 1 tablet by mouth Every Night., Disp: 90 tablet, Rfl: 1  •  calcium-vitamin D (OSCAL 500/200 D-3) 500-200 MG-UNIT per tablet, Take 1 tablet by mouth daily., Disp: , Rfl:   •  Cholecalciferol (VITAMIN D3) 2000 UNITS tablet, Take 1 tablet by mouth Daily., Disp: , Rfl:   •  clonazePAM (KlonoPIN) 0.5 MG tablet, Take 0.5-1 tablets by mouth Daily As Needed for Anxiety., Disp: 10 tablet, Rfl: 5  •  Coenzyme Q10 (CO Q-10) 200 MG capsule, Take 1 capsule by mouth daily., Disp: , Rfl:   •  Multiple  Vitamins-Minerals (PRESERVISION AREDS 2) capsule, Take 1 capsule by mouth daily., Disp: , Rfl:   •  Multiple Vitamins-Minerals (WOMENS MULTI VITAMIN & MINERAL) tablet, Take 1 tablet by mouth daily., Disp: , Rfl:   •  Omega-3 Fatty Acids (FISH OIL) 1000 MG capsule capsule, Take 1 capsule by mouth daily., Disp: , Rfl:   •  psyllium (METAMUCIL) 0.52 G capsule, Take 3 capsules by mouth daily., Disp: , Rfl:   •  doxycycline (MONODOX) 100 MG capsule, Take 1 capsule by mouth 2 (Two) Times a Day for 10 days., Disp: 20 capsule, Rfl: 0       Plan of care reviewed with the patient at the conclusion of today's visit.  Education was provided regarding diagnosis, management, and any prescribed or recommended OTC medications.  Patient verbalized understanding of and agreement with management plan.     No Follow-up on file.      Anuja Chery, APRN

## 2019-09-25 ENCOUNTER — OFFICE VISIT (OUTPATIENT)
Dept: INTERNAL MEDICINE | Facility: CLINIC | Age: 79
End: 2019-09-25

## 2019-09-25 VITALS
SYSTOLIC BLOOD PRESSURE: 124 MMHG | WEIGHT: 155 LBS | TEMPERATURE: 97.6 F | HEIGHT: 66 IN | DIASTOLIC BLOOD PRESSURE: 82 MMHG | BODY MASS INDEX: 24.91 KG/M2

## 2019-09-25 DIAGNOSIS — R60.9 PERIPHERAL EDEMA: ICD-10-CM

## 2019-09-25 DIAGNOSIS — R73.01 IFG (IMPAIRED FASTING GLUCOSE): ICD-10-CM

## 2019-09-25 DIAGNOSIS — F41.9 ANXIETY AND DEPRESSION: ICD-10-CM

## 2019-09-25 DIAGNOSIS — F32.A ANXIETY AND DEPRESSION: ICD-10-CM

## 2019-09-25 DIAGNOSIS — E78.00 PURE HYPERCHOLESTEROLEMIA: Primary | ICD-10-CM

## 2019-09-25 DIAGNOSIS — S80.921A: ICD-10-CM

## 2019-09-25 DIAGNOSIS — R74.8 ELEVATED SERUM ALKALINE PHOSPHATASE LEVEL: ICD-10-CM

## 2019-09-25 PROCEDURE — 99214 OFFICE O/P EST MOD 30 MIN: CPT | Performed by: INTERNAL MEDICINE

## 2019-09-25 RX ORDER — ESCITALOPRAM OXALATE 20 MG/1
20 TABLET ORAL DAILY
Qty: 90 TABLET | Refills: 2 | Status: SHIPPED | OUTPATIENT
Start: 2019-09-25 | End: 2020-06-26 | Stop reason: SDUPTHER

## 2019-09-25 RX ORDER — FUROSEMIDE 20 MG/1
20 TABLET ORAL DAILY PRN
Qty: 30 TABLET | Refills: 0 | OUTPATIENT
Start: 2019-09-25 | End: 2023-01-16

## 2019-09-25 NOTE — PROGRESS NOTES
"Subjective   Joanne Tineo is a 79 y.o. female here for follow-up A&D, peripheral edema, new leg injury, new elevated ALP. A&D: was on 1/2 tab lexapro and recently went back up to full tab as she felt mood wasn't at goal. She would like to continue on the full tab as it works better. Edema: takes lasix PRN which works well. She feels RLE is a bit swollen due to abrasion she sustained a month ago. She stepped over a dog gate and scratched the skin off. It is a large area of skin loss with scab. Some erythema around it. No drainage. Still has some lateral leg pain that hasn't changed. She is concerned about the elevated ALP. Thinks it could be her statin.     I have reviewed the following portions of the patient's history and confirmed they are accurate: current medications, past medical history and problem list     I have personally completed the patient's review of systems.    Review of Systems:  General: negative  CV: negative  Respiratory: negative  Neuro: negative  Psych: A&D  GI: negative  Skin: wound    Objective   /82 (BP Location: Left arm, Patient Position: Sitting, Cuff Size: Large Adult)   Temp 97.6 °F (36.4 °C) (Temporal)   Ht 167.6 cm (66\")   Wt 70.3 kg (155 lb)   BMI 25.02 kg/m²     Physical Exam   Constitutional: She is oriented to person, place, and time. She appears well-developed and well-nourished.   Cardiovascular: Normal rate, regular rhythm and normal heart sounds.   Pulmonary/Chest: Effort normal and breath sounds normal. She has no wheezes. She has no rales.   Neurological: She is alert and oriented to person, place, and time.   Skin: Skin is warm and dry.   Psychiatric: She has a normal mood and affect. Her behavior is normal. Thought content normal.   Vitals reviewed.      Assessment/Plan   Joanne was seen today for hyperlipidemia and depression.    Diagnoses and all orders for this visit:    Pure hypercholesterolemia  -     Lipid Panel  -chronic controlled. If ALP not " improved consider holding statin to see if that is contributing to increased ALP    Superficial injury of right leg, initial encounter  -healing well though slowly. Told pt if it drains or gets red/hot to RTC    Anxiety and depression  -     escitalopram (LEXAPRO) 20 MG tablet; Take 1 tablet by mouth Daily.  -chronic controlled    IFG (impaired fasting glucose)  -     Hemoglobin A1c  -last a1c 5.7%    Elevated serum alkaline phosphatase level  -     Comprehensive Metabolic Panel  -recheck today, discussed likely fatty liver vs statin effect    Peripheral edema  -     furosemide (LASIX) 20 MG tablet; Take 1 tablet by mouth Daily As Needed (edema).  -chronic controlled, takes PRN lasix             Victoria Arndt MA

## 2019-09-26 LAB
ALBUMIN SERPL-MCNC: 4 G/DL (ref 3.5–5.2)
ALBUMIN/GLOB SERPL: 1.5 G/DL
ALP SERPL-CCNC: 119 U/L (ref 39–117)
ALT SERPL-CCNC: 29 U/L (ref 1–33)
AST SERPL-CCNC: 23 U/L (ref 1–32)
BILIRUB SERPL-MCNC: 0.2 MG/DL (ref 0.2–1.2)
BUN SERPL-MCNC: 22 MG/DL (ref 8–23)
BUN/CREAT SERPL: 31 (ref 7–25)
CALCIUM SERPL-MCNC: 9 MG/DL (ref 8.6–10.5)
CHLORIDE SERPL-SCNC: 105 MMOL/L (ref 98–107)
CHOLEST SERPL-MCNC: 190 MG/DL (ref 0–200)
CO2 SERPL-SCNC: 29.4 MMOL/L (ref 22–29)
CREAT SERPL-MCNC: 0.71 MG/DL (ref 0.57–1)
GLOBULIN SER CALC-MCNC: 2.7 GM/DL
GLUCOSE SERPL-MCNC: 101 MG/DL (ref 65–99)
HBA1C MFR BLD: 5.9 % (ref 4.8–5.6)
HDLC SERPL-MCNC: 51 MG/DL (ref 40–60)
LDLC SERPL CALC-MCNC: 97 MG/DL (ref 0–100)
POTASSIUM SERPL-SCNC: 4.8 MMOL/L (ref 3.5–5.2)
PROT SERPL-MCNC: 6.7 G/DL (ref 6–8.5)
SODIUM SERPL-SCNC: 144 MMOL/L (ref 136–145)
TRIGL SERPL-MCNC: 210 MG/DL (ref 0–150)
VLDLC SERPL CALC-MCNC: 42 MG/DL

## 2019-10-14 ENCOUNTER — HOSPITAL ENCOUNTER (OUTPATIENT)
Dept: MAMMOGRAPHY | Facility: HOSPITAL | Age: 79
Discharge: HOME | End: 2019-10-14
Admitting: INTERNAL MEDICINE

## 2019-10-14 ENCOUNTER — APPOINTMENT (OUTPATIENT)
Dept: OTHER | Facility: HOSPITAL | Age: 79
End: 2019-10-14

## 2019-10-14 DIAGNOSIS — Z12.31 VISIT FOR SCREENING MAMMOGRAM: ICD-10-CM

## 2019-10-14 DIAGNOSIS — Z92.89 H/O MAMMOGRAM: ICD-10-CM

## 2019-10-14 PROCEDURE — 77067 SCR MAMMO BI INCL CAD: CPT | Performed by: RADIOLOGY

## 2019-10-14 PROCEDURE — 77063 BREAST TOMOSYNTHESIS BI: CPT | Performed by: RADIOLOGY

## 2019-10-14 PROCEDURE — 77063 BREAST TOMOSYNTHESIS BI: CPT

## 2019-10-14 PROCEDURE — 77067 SCR MAMMO BI INCL CAD: CPT

## 2019-10-18 DIAGNOSIS — F41.9 ANXIETY: ICD-10-CM

## 2019-10-18 RX ORDER — CLONAZEPAM 0.5 MG/1
TABLET ORAL
Qty: 10 TABLET | Refills: 4 | Status: SHIPPED | OUTPATIENT
Start: 2019-10-18 | End: 2020-06-26 | Stop reason: SDUPTHER

## 2019-10-22 RX ORDER — ATORVASTATIN CALCIUM 80 MG/1
TABLET, FILM COATED ORAL
Qty: 90 TABLET | Refills: 0 | OUTPATIENT
Start: 2019-10-22

## 2019-10-23 DIAGNOSIS — E78.00 PURE HYPERCHOLESTEROLEMIA: ICD-10-CM

## 2019-10-23 RX ORDER — ATORVASTATIN CALCIUM 80 MG/1
80 TABLET, FILM COATED ORAL NIGHTLY
Qty: 90 TABLET | Refills: 1 | Status: SHIPPED | OUTPATIENT
Start: 2019-10-23 | End: 2021-02-01

## 2019-11-19 PROCEDURE — 87205 SMEAR GRAM STAIN: CPT | Performed by: FAMILY MEDICINE

## 2019-11-19 PROCEDURE — 87076 CULTURE ANAEROBE IDENT EACH: CPT | Performed by: FAMILY MEDICINE

## 2019-11-19 PROCEDURE — 87070 CULTURE OTHR SPECIMN AEROBIC: CPT | Performed by: FAMILY MEDICINE

## 2019-11-21 ENCOUNTER — TELEPHONE (OUTPATIENT)
Dept: URGENT CARE | Facility: CLINIC | Age: 79
End: 2019-11-21

## 2019-11-21 NOTE — TELEPHONE ENCOUNTER
Dr Carrero spoke with pt about her wound culture and sent in meds to her pharmacy. Pt LIDIA. Reviewed by Dr Carrero

## 2019-11-22 ENCOUNTER — OFFICE VISIT (OUTPATIENT)
Dept: INTERNAL MEDICINE | Facility: CLINIC | Age: 79
End: 2019-11-22

## 2019-11-22 VITALS
SYSTOLIC BLOOD PRESSURE: 122 MMHG | BODY MASS INDEX: 26.63 KG/M2 | WEIGHT: 156 LBS | HEIGHT: 64 IN | TEMPERATURE: 97.8 F | DIASTOLIC BLOOD PRESSURE: 64 MMHG

## 2019-11-22 DIAGNOSIS — S81.802S LEG WOUND, LEFT, SEQUELA: Primary | ICD-10-CM

## 2019-11-22 DIAGNOSIS — B94.8 SEQUELA OF CORYNEBACTERIUM INFECTION: ICD-10-CM

## 2019-11-22 PROCEDURE — 99213 OFFICE O/P EST LOW 20 MIN: CPT | Performed by: INTERNAL MEDICINE

## 2019-11-22 NOTE — PROGRESS NOTES
"Subjective   Joanne Tineo is a 79 y.o. female here for follow-up leg wound.  She has had this leg wound since the beginning of September.  She scraped her leg and had an abrasion on the area.  She was given doxycycline at that time to cover for infection.  Patient said she noticed that the area was not healing and had a bit of drainage.  She went to [facility] on Monday and a wound culture was done.  She was told it was positive for corynebacterium and was given clindamycin topical ointment.  She has been using that yesterday and today.  She has not noticed much improvement yet.  She is also having some pain on the backside of the leg near the wound.  It comes and goes and is aching in quality.  She denies any constitutional symptoms.    I have reviewed the following portions of the patient's history and confirmed they are accurate: current medications, past medical history and problem list     I have personally completed the patient's review of systems.    Review of Systems:  General: negative  MSK: Leg pain  Skin: Wound, see HPI    Objective   /64 (BP Location: Left arm, Patient Position: Sitting, Cuff Size: Adult)   Temp 97.8 °F (36.6 °C) (Temporal)   Ht 162.6 cm (64\")   Wt 70.8 kg (156 lb)   BMI 26.78 kg/m²     Physical Exam   Constitutional: She is oriented to person, place, and time. She appears well-developed and well-nourished.   Pulmonary/Chest: Effort normal.   Musculoskeletal:   Left calf nontender.  Medial malleoli are area with mild swelling, no tenderness   Neurological: She is alert and oriented to person, place, and time.   Skin: Skin is warm and dry.        Psychiatric: She has a normal mood and affect. Her behavior is normal. Judgment and thought content normal.   Vitals reviewed.  Skin: Indicated area with superficial wound.  Underlying skin is yellowish and inflamed.  No blisters or bullae.  Surrounding skin is erythematous and slightly edematous.  No warmth or " tenderness.    Assessment/Plan   Joanne was seen today for leg injury.    Diagnoses and all orders for this visit:    Leg wound, left, sequela  -Wound culture with rare corynebacterium.  Continue clindamycin topical.  Return to clinic for 2 weeks for wound recheck.  If at that time the wound is not resolved, will do wound culture again.  If grows bacteria, will refer to infectious disease.  If does not grow bacteria, will refer to wound care.  Discussed plan with patient she is agreeable.  -Unresolved problem requiring treatment and management    Sequela of Corynebacterium infection  -See above.  New problem requiring treatment           Victoria Arndt MA    Please note that portions of this note were completed with a voice recognition program. Efforts were made to edit the dictations, but occasionally words are mistranscribed.

## 2019-12-13 ENCOUNTER — OFFICE VISIT (OUTPATIENT)
Dept: INTERNAL MEDICINE | Facility: CLINIC | Age: 79
End: 2019-12-13

## 2019-12-13 VITALS
WEIGHT: 156 LBS | SYSTOLIC BLOOD PRESSURE: 120 MMHG | DIASTOLIC BLOOD PRESSURE: 68 MMHG | TEMPERATURE: 97.6 F | BODY MASS INDEX: 26.63 KG/M2 | HEIGHT: 64 IN

## 2019-12-13 DIAGNOSIS — B94.8 SEQUELA OF CORYNEBACTERIUM INFECTION: ICD-10-CM

## 2019-12-13 DIAGNOSIS — S81.802S LEG WOUND, LEFT, SEQUELA: Primary | ICD-10-CM

## 2019-12-13 PROCEDURE — 99214 OFFICE O/P EST MOD 30 MIN: CPT | Performed by: INTERNAL MEDICINE

## 2019-12-13 NOTE — PROGRESS NOTES
"Subjective   Joanne Tineo is a 79 y.o. female here for follow-up left leg wound.  It has been present since the beginning of September after she sustained an abrasion from a wooden surface.  A wound culture revealed corynebacterium.  The wound has been persistent since then, with some healing and then it opening up again.  She said yesterday the wound was covered with a thick scaly brownish material, then she showered and now it appears open and wet.  The surrounding skin is also erythematous and a bit scaly.  She denies any pus from the wound, though there can be some serous drainage.  She is having some pain from the left ankle that shoots up the calf, it is intermittent though it does seem to occur every day.  There is nothing that seems to bring that on.  She does not feel ill, no fever, no chills.  She says this is more of a nuisance, a nagging problem that will not go away.    I have reviewed the following portions of the patient's history and confirmed they are accurate: allergies, current medications, past medical history and problem list     I have personally completed the patient's review of systems.    Review of Systems:  General: negative  Neuro: negative  MSK: leg pain  Skin: wound    Objective   /68 (BP Location: Left arm, Patient Position: Sitting, Cuff Size: Adult)   Temp 97.6 °F (36.4 °C) (Temporal)   Ht 162.6 cm (64\")   Wt 70.8 kg (156 lb)   BMI 26.78 kg/m²     Physical Exam   Constitutional: She is oriented to person, place, and time. She appears well-developed and well-nourished.   Pulmonary/Chest: Effort normal.   Neurological: She is alert and oriented to person, place, and time.   Skin: Skin is warm and dry.        Psychiatric: She has a normal mood and affect. Her behavior is normal. Judgment and thought content normal.   Vitals reviewed.  Skin: Indicated area with irregular 3 cm long superficial wound.  Wound is moist without pus, serous material present.  Some yellowish and " reddish areas.  Surrounding skin is erythematous and scaly, no warmth.  Erythema extends from the wound down the leg to the upper medial ankle.  No pain with palpation around the wound or down the leg to the ankle    Assessment/Plan   Joanne was seen today for follow-up.    Diagnoses and all orders for this visit:    Leg wound, left, sequela  -     CT lower extremity left wo contrast  -     Ambulatory Referral to Infectious Disease  -Wound persistent for 3 months.  Likely needs IV antibiotics.  Will get CT to evaluate for cellulitis and with the bony pain there is some concern for osteomyelitis so the CT can aid in that  -Told patient if she starts to feel ill at any time with fever, chills then she should go to the ER as this could be a potentially serious problem but more likely a smoldering infection  -Persistent problem    Sequela of Corynebacterium infection  -     CT lower extremity left wo contrast  -     Ambulatory Referral to Infectious Disease  -See above.  Requiring work-up and treatment             Victoria Arndt MA    Please note that portions of this note were completed with a voice recognition program. Efforts were made to edit the dictations, but occasionally words are mistranscribed.

## 2019-12-16 ENCOUNTER — TELEPHONE (OUTPATIENT)
Dept: INTERNAL MEDICINE | Facility: CLINIC | Age: 79
End: 2019-12-16

## 2019-12-16 NOTE — TELEPHONE ENCOUNTER
----- Message from Lynda Landrum MD sent at 12/16/2019 10:01 AM EST -----  Please call the patient regarding her abnormal result.  Tell her that the infection on her leg is just in the skin.  It does not go to the bone and there is no obvious abscess.  Hopefully she will hear from infectious disease soon about her appointment if she has not already.

## 2019-12-19 ENCOUNTER — TRANSCRIBE ORDERS (OUTPATIENT)
Dept: PHYSICAL THERAPY | Facility: HOSPITAL | Age: 79
End: 2019-12-19

## 2019-12-19 DIAGNOSIS — L03.116 CELLULITIS OF LEFT LOWER EXTREMITY: ICD-10-CM

## 2019-12-19 DIAGNOSIS — T14.8XXA OPEN WOUND: Primary | ICD-10-CM

## 2019-12-31 ENCOUNTER — HOSPITAL ENCOUNTER (OUTPATIENT)
Dept: PHYSICAL THERAPY | Facility: HOSPITAL | Age: 79
Setting detail: THERAPIES SERIES
Discharge: HOME | End: 2019-12-31

## 2019-12-31 DIAGNOSIS — S81.802D OPEN WOUND OF LEFT LOWER EXTREMITY, SUBSEQUENT ENCOUNTER: Primary | ICD-10-CM

## 2019-12-31 PROCEDURE — 97597 DBRDMT OPN WND 1ST 20 CM/<: CPT

## 2019-12-31 PROCEDURE — 97162 PT EVAL MOD COMPLEX 30 MIN: CPT

## 2019-12-31 PROCEDURE — 97610 LOW FREQUENCY NON-THERMAL US: CPT

## 2020-01-03 ENCOUNTER — HOSPITAL ENCOUNTER (OUTPATIENT)
Dept: PHYSICAL THERAPY | Facility: HOSPITAL | Age: 80
Setting detail: THERAPIES SERIES
Discharge: HOME | End: 2020-01-03

## 2020-01-03 DIAGNOSIS — S81.802D OPEN WOUND OF LEFT LOWER EXTREMITY, SUBSEQUENT ENCOUNTER: Primary | ICD-10-CM

## 2020-01-03 PROCEDURE — 97610 LOW FREQUENCY NON-THERMAL US: CPT

## 2020-01-03 PROCEDURE — 97597 DBRDMT OPN WND 1ST 20 CM/<: CPT

## 2020-01-03 NOTE — THERAPY WOUND CARE TREATMENT
Outpatient Rehabilitation - Wound/Debridement Treatment Note   [facility]     Patient Name: Joanne Tineo  : 1940  MRN: [id number]  Today's Date: 1/3/2020                 Admit Date: 1/3/2020    Visit Dx:    ICD-10-CM ICD-9-CM   1. Open wound of left lower extremity, subsequent encounter S81.802D V58.89     891.0       Patient Active Problem List   Diagnosis   • Depression   • Hearing impairment   • Hyperlipidemia   • Irritable bowel syndrome   • Left anterior fascicular block   • Macular degeneration   • Dyssomnia   • Vitamin B deficiency   • Hyperglycemia   • Family history of heart disease   • Vitamin D deficiency        Past Medical History:   Diagnosis Date   • Chronic anxiety Adulthood    Intermittent clonazepam   • Chronic lower back pain    • Depression 1984 - acute flare with 's death   • Fracture of pelvis (CMS/[facility])     Routine healing   • Hearing impairment     Corrects with bilateral hearing aids   • Hyperlipidemia     Total greater than 300   • Irritable bowel syndrome    • Macular degeneration    • Pneumonia     Treated outpatient   • Post menopausal syndrome    • Prediabetes     Hemoglobin A1c 6.0%.   • Sleep disorder    • Vitamin D deficiency         Past Surgical History:   Procedure Laterality Date   • COLONOSCOPY  2016   • DIAGNOSTIC LAPAROSCOPY  1965    for infertility   • TONSILLECTOMY  1943         EVALUATION  PT Ortho     Row Name 20 0845       Subjective Comments    Subjective Comments  Pt reports the wound appears to be doing much better. The dressing came off yesterday evening and she didn't have the silver foam, so she did the best she could.  -MC       Subjective Pain    Able to rate subjective pain?  yes  -MC    Pre-Treatment Pain Level  0  -MC    Post-Treatment Pain Level  0  -MC       Transfers    Sit-Stand [place] (Transfers)  independent  -MC    Stand-Sit [place] (Transfers)  independent  -MC    Comment  "(Transfers)  seated for tx  -       Gait/Stairs Assessment/Training    [place] Level (Gait)  independent  -      User Key  (r) = Recorded By, (t) = Taken By, (c) = Cosigned By    Initials Name Provider Type    Pam Ricardo, PT Physical Therapist          LDA Wound     Row Name 01/03/20 0845             Wound 12/31/19 1100 Left anterior leg Laceration    Wound - Properties Group Date first assessed: 12/31/19  - Time first assessed: 1100  - Side: Left  - Orientation: anterior  - Location: leg  - Primary Wound Type: Laceration  - Additional Comments: laceration from dog gate sustained 09/2019  -    Dressing Appearance  intact;moist drainage;other (see comments) bandaid pad with honey, optifoam, foam tape  -      Periwound  intact;moist;pink  -      Periwound Temperature  warm  -      Periwound Skin Turgor  soft  -      Edges  irregular  -      Drainage Characteristics/Odor  serosanguineous  -      Drainage Amount  scant  -      Care, Wound  cleansed with;wound cleanser;debrided;ultrasound therapy, non contact low frequency;honey applied MIST 6 minutes  -      Dressing Care, Wound  dressing applied;silver impregnated;low-adherent;foam;border dressing mepilex Ag, 4\" optifoam, primafix tape  -      Periwound Care, Wound  cleansed with pH balanced cleanser;dry periwound area maintained  -        User Key  (r) = Recorded By, (t) = Taken By, (c) = Cosigned By    Initials Name Provider Type    Pam Ricardo, PT Physical Therapist    Laquita Ortega, PT Physical Therapist            WOUND DEBRIDEMENT  Total area of Debridement: 2 cm2  Debridement Site 1  Location- Site 1: LLE  Selective Debridement- Site 1: Wound Surface <20cmsq  Instruments- Site 1: #15, scapel, tweezers  Excised Tissue Description- Site 1: moderate, slough  Bleeding- Site 1: scant, held pressure, 1 minute             Therapy Education     Row Name 01/03/20 0845             Therapy Education    " Education Details  Issued silver foam and primafix tape to allot pt to reinforce or replace dressing if needed between appts.  -      Given  Bandaging/dressing change  -      Program  Reinforced  -      How Provided  Verbal;Demonstration  -MC      Provided to  Patient  -      Level of Understanding  Verbalized  -        User Key  (r) = Recorded By, (t) = Taken By, (c) = Cosigned By    Initials Name Provider Type    Pam Ricardo, PT Physical Therapist          Recommendation and Plan  PT Assessment/Plan     Row Name 01/03/20 0845          PT Assessment    Functional Limitations  Performance in self-care ADL;Other (comment) wound mgmt  -     Impairments  Integumentary integrity  -     Assessment Comments  PT able to debride additional biofilm/slough today, revealing additional granulation tissue. Pt still with some adherent slough remaining that will require additional debridement. Pt appears to be improving with the current POC.   -     Rehab Potential  Good  -     Patient/caregiver participated in establishment of treatment plan and goals  Yes  -     Patient would benefit from skilled therapy intervention  Yes  -        PT Plan    PT Frequency  2x/week;3x/week  -     Physical Therapy Interventions (Optional Details)  patient/family education;wound care  -     PT Plan Comments  MIST, debridement, ?add huan when/if indicated  -       User Key  (r) = Recorded By, (t) = Taken By, (c) = Cosigned By    Initials Name Provider Type    Pam Ricardo, PT Physical Therapist          Goals  PT OP Goals     Row Name 01/03/20 0845          Time Calculation    PT Goal Re-Cert Due Date  03/30/20  -       User Key  (r) = Recorded By, (t) = Taken By, (c) = Cosigned By    Initials Name Provider Type    Pam Ricardo, PT Physical Therapist          PT Goal Re-Cert Due Date: 03/30/20            Time Calculation: Start Time: 0845  Therapy Charges for Today     Code Description  Service Date Service Provider Modifiers Qty    37135738777 HC PT NLFU MIST 1/3/2020 Pam Oseguera, PT GP 1    [id number] HC [place] DEBRIDE OPEN WOUND UP TO 20CM 1/3/2020 Pam Oseguera, PT GP 1                  Pam Oseguera, PT  1/3/2020

## 2020-01-06 ENCOUNTER — HOSPITAL ENCOUNTER (OUTPATIENT)
Dept: PHYSICAL THERAPY | Facility: HOSPITAL | Age: 80
Setting detail: THERAPIES SERIES
Discharge: HOME | End: 2020-01-06

## 2020-01-06 DIAGNOSIS — S81.802D OPEN WOUND OF LEFT LOWER EXTREMITY, SUBSEQUENT ENCOUNTER: Primary | ICD-10-CM

## 2020-01-06 PROCEDURE — 97610 LOW FREQUENCY NON-THERMAL US: CPT

## 2020-01-06 PROCEDURE — 97597 DBRDMT OPN WND 1ST 20 CM/<: CPT

## 2020-01-06 NOTE — THERAPY WOUND CARE TREATMENT
Outpatient Rehabilitation - Wound/Debridement Treatment Note   [facility]     Patient Name: Joanne Tineo  : 1940  MRN: [id number]  Today's Date: 2020                 Admit Date: 2020    Visit Dx:    ICD-10-CM ICD-9-CM   1. Open wound of left lower extremity, subsequent encounter S81.802D V58.89     891.0       Patient Active Problem List   Diagnosis   • Depression   • Hearing impairment   • Hyperlipidemia   • Irritable bowel syndrome   • Left anterior fascicular block   • Macular degeneration   • Dyssomnia   • Vitamin B deficiency   • Hyperglycemia   • Family history of heart disease   • Vitamin D deficiency        Past Medical History:   Diagnosis Date   • Chronic anxiety Adulthood    Intermittent clonazepam   • Chronic lower back pain    • Depression 1984 - acute flare with 's death   • Fracture of pelvis (CMS/[facility])     Routine healing   • Hearing impairment 2014    Corrects with bilateral hearing aids   • Hyperlipidemia     Total greater than 300   • Irritable bowel syndrome    • Macular degeneration    • Pneumonia     Treated outpatient   • Post menopausal syndrome    • Prediabetes     Hemoglobin A1c 6.0%.   • Sleep disorder    • Vitamin D deficiency         Past Surgical History:   Procedure Laterality Date   • COLONOSCOPY  2016   • DIAGNOSTIC LAPAROSCOPY  1965    for infertility   • TONSILLECTOMY  1943         EVALUATION  PT Ortho     Row Name 20 4254       Subjective Comments    Subjective Comments  Pt states she had to change the dressing a couple times, ran out of optifoams, so had to just use ag foam and tape yesterday.  -JM       Subjective Pain    Able to rate subjective pain?  yes  -JM    Pre-Treatment Pain Level  0  -JM    Post-Treatment Pain Level  0  -JM       Transfers    Sit-Stand [place] (Transfers)  independent  -JM    Stand-Sit [place] (Transfers)  independent  -JM    Comment (Transfers)  long sitting for tx  -JM  "      Gait/Stairs Assessment/Training    [place] Level (Gait)  independent  -      User Key  (r) = Recorded By, (t) = Taken By, (c) = Cosigned By    Initials Name Provider Type    Laquita Ortega, PT Physical Therapist          LDA Wound     Row Name 01/06/20 0930             Wound 12/31/19 1100 Left anterior leg Laceration    Wound - Properties Group Date first assessed: 12/31/19  - Time first assessed: 1100  - Side: Left  - Orientation: anterior  - Location: leg  - Primary Wound Type: Laceration  - Additional Comments: laceration from dog gate sustained 09/2019  -    Wound Image  Images linked: 1  -      Dressing Appearance  intact;moist drainage;other (see comments) ag foam with primafix  -      Base  granulating;moist;pink;yellow;slough;white;epithelialization mostly granulated, min white fibrous in deeper aspect  -      Periwound  intact;moist;pink  -      Periwound Temperature  warm  -      Periwound Skin Turgor  soft  -      Edges  irregular  -      Drainage Characteristics/Odor  serosanguineous  -      Drainage Amount  scant  -      Care, Wound  cleansed with;wound cleanser;debrided;ultrasound therapy, non contact low frequency;honey applied MIST 8min  -      Dressing Care, Wound  dressing applied;silver impregnated;foam;border dressing mepilex ag, 4\" optifoam, primafix  -      Periwound Care, Wound  cleansed with pH balanced cleanser;dry periwound area maintained  -        User Key  (r) = Recorded By, (t) = Taken By, (c) = Cosigned By    Initials Name Provider Type    Laquita Ortega, PT Physical Therapist            WOUND DEBRIDEMENT  Total area of Debridement: 3cmsq  Debridement Site 1  Location- Site 1: LLE  Selective Debridement- Site 1: Wound Surface <20cmsq  Instruments- Site 1: tweezers  Excised Tissue Description- Site 1: moderate, slough, other (comment)(hypertrophic crust)  Bleeding- Site 1: scant             Therapy Education     Row Name " 01/06/20 0930             Therapy Education    Education Details  Keep dressing dry/intact until next tx, but change if wet or disrupted.  -      Given  Bandaging/dressing change  -      Program  Reinforced  -      How Provided  Verbal;Demonstration  -      Provided to  Patient  -      Level of Understanding  Verbalized  -        User Key  (r) = Recorded By, (t) = Taken By, (c) = Cosigned By    Initials Name Provider Type    Laquita Ortega, PT Physical Therapist          Recommendation and Plan  PT Assessment/Plan     Row Name 01/06/20 0930          PT Assessment    Functional Limitations  Performance in self-care ADL;Other (comment) wound mgmt  -     Impairments  Integumentary integrity  -     Assessment Comments  LLE wound with new epithelial growth at edges, wound base mostly granulation tissue, but deeper aspects under slough more fibrous base with granulation buds forming.  Pt will continue to benefit from MIST and debridement to promote wound healing.  -        PT Plan    PT Frequency  2x/week;3x/week  -     Physical Therapy Interventions (Optional Details)  patient/family education;wound care  -     PT Plan Comments  MIST, debridement  -       User Key  (r) = Recorded By, (t) = Taken By, (c) = Cosigned By    Initials Name Provider Type    Laquita Ortega, PT Physical Therapist          Goals  PT OP Goals     Row Name 01/06/20 0930          Time Calculation    PT Goal Re-Cert Due Date  03/30/20  -       User Key  (r) = Recorded By, (t) = Taken By, (c) = Cosigned By    Initials Name Provider Type    Laquita Ortega, PT Physical Therapist          PT Goal Re-Cert Due Date: 03/30/20            Time Calculation: Start Time: 0930  Therapy Charges for Today     Code Description Service Date Service Provider Modifiers Qty    [id number]  [place] DEBRIDE OPEN WOUND UP TO 20CM 1/6/2020 Laquita Matamoros, PT GP 1    [id number]  PT NLFU MIST 1/6/2020 Laquita Matamoros, PT  GP 1                  Laquita Matamoros, PT  1/6/2020

## 2020-01-08 ENCOUNTER — HOSPITAL ENCOUNTER (OUTPATIENT)
Dept: PHYSICAL THERAPY | Facility: HOSPITAL | Age: 80
Setting detail: THERAPIES SERIES
Discharge: HOME | End: 2020-01-08

## 2020-01-08 DIAGNOSIS — S81.802D OPEN WOUND OF LEFT LOWER EXTREMITY, SUBSEQUENT ENCOUNTER: Primary | ICD-10-CM

## 2020-01-08 PROCEDURE — 97597 DBRDMT OPN WND 1ST 20 CM/<: CPT

## 2020-01-08 PROCEDURE — 97610 LOW FREQUENCY NON-THERMAL US: CPT

## 2020-01-08 NOTE — THERAPY WOUND CARE TREATMENT
Outpatient Rehabilitation - Wound/Debridement Treatment Note   [facility]     Patient Name: Joanne Tineo  : 1940  MRN: [id number]  Today's Date: 2020                 Admit Date: 2020    Visit Dx:    ICD-10-CM ICD-9-CM   1. Open wound of left lower extremity, subsequent encounter S81.802D V58.89     891.0       Patient Active Problem List   Diagnosis   • Depression   • Hearing impairment   • Hyperlipidemia   • Irritable bowel syndrome   • Left anterior fascicular block   • Macular degeneration   • Dyssomnia   • Vitamin B deficiency   • Hyperglycemia   • Family history of heart disease   • Vitamin D deficiency        Past Medical History:   Diagnosis Date   • Chronic anxiety Adulthood    Intermittent clonazepam   • Chronic lower back pain    • Depression 1984 - acute flare with 's death   • Fracture of pelvis (CMS/[facility])     Routine healing   • Hearing impairment 2014    Corrects with bilateral hearing aids   • Hyperlipidemia     Total greater than 300   • Irritable bowel syndrome    • Macular degeneration    • Pneumonia     Treated outpatient   • Post menopausal syndrome    • Prediabetes     Hemoglobin A1c 6.0%.   • Sleep disorder    • Vitamin D deficiency         Past Surgical History:   Procedure Laterality Date   • COLONOSCOPY  2016   • DIAGNOSTIC LAPAROSCOPY  1965    for infertility   • TONSILLECTOMY  1943         EVALUATION  PT Ortho     Row Name 20 1345       Subjective Comments    Subjective Comments  Pt without complaints.  States she had a follow-up with Dr. Bah at [facility] today, who signed off on pt due to good progress.  Continue with wound care.  -JM       Subjective Pain    Able to rate subjective pain?  yes  -    Pre-Treatment Pain Level  0  -JM    Post-Treatment Pain Level  0  -JM       Transfers    Sit-Stand [place] (Transfers)  independent  -JM    Stand-Sit [place] (Transfers)  independent  -    Comment (Transfers)   "long sitting for tx  -       Gait/Stairs Assessment/Training    [place] Level (Gait)  independent  -      User Key  (r) = Recorded By, (t) = Taken By, (c) = Cosigned By    Initials Name Provider Type    Laquita Ortega, PT Physical Therapist          LDA Wound     Row Name 01/08/20 1345             Wound 12/31/19 1100 Left anterior leg Laceration    Wound - Properties Group Date first assessed: 12/31/19  - Time first assessed: 1100  - Side: Left  - Orientation: anterior  - Location: leg  - Primary Wound Type: Laceration  - Additional Comments: laceration from dog gate sustained 09/2019  -    Dressing Appearance  intact;moist drainage;other (see comments) temp dressing placed at MD office  -      Base  granulating;moist;pink;yellow;slough;white;epithelialization mostly granulated, min white fibrous in deeper aspect  -      Periwound  intact;moist;pink  -      Periwound Temperature  warm  -      Periwound Skin Turgor  soft  -      Edges  irregular  -      Drainage Characteristics/Odor  serosanguineous  -      Drainage Amount  scant  -      Care, Wound  cleansed with;wound cleanser;debrided;ultrasound therapy, non contact low frequency;honey applied MIST x8min  -      Dressing Care, Wound  dressing applied;silver impregnated;foam;border dressing mepilex ag, 4\" optifoam gentle  -      Periwound Care, Wound  cleansed with pH balanced cleanser;dry periwound area maintained  -        User Key  (r) = Recorded By, (t) = Taken By, (c) = Cosigned By    Initials Name Provider Type    Laquita Ortega, PT Physical Therapist            WOUND DEBRIDEMENT  Total area of Debridement: 2cmsq  Debridement Site 1  Location- Site 1: LLE  Selective Debridement- Site 1: Wound Surface <20cmsq  Instruments- Site 1: tweezers, #15, scapel  Excised Tissue Description- Site 1: minimum, slough  Bleeding- Site 1: none             Therapy Education     Row Name 01/08/20 1345             " Therapy Education    Education Details  Keep dressing dry/intact, shower with dressing in place, but change if gets wet.  Plan for pt to change dressing on Friday and return for tx on Monday.  -      Given  Bandaging/dressing change  -      Program  Reinforced  -      How Provided  Verbal;Demonstration  -      Provided to  Patient  -      Level of Understanding  Verbalized  -        User Key  (r) = Recorded By, (t) = Taken By, (c) = Cosigned By    Initials Name Provider Type    Laquita Ortega, PT Physical Therapist          Recommendation and Plan  PT Assessment/Plan     Row Name 01/08/20 1345          PT Assessment    Functional Limitations  Performance in self-care ADL;Other (comment) wound mgmt  -     Impairments  Integumentary integrity  -     Assessment Comments  LLE wound with continued granulation and new epithelial growth at edges, still with min residual slough in fibrous areas that PT debrided.  Pt making good progerss with current tx of MIST, debridement, and therahoney with ag foam dressings.  -        PT Plan    PT Frequency  2x/week  -     Physical Therapy Interventions (Optional Details)  patient/family education;wound care  -     PT Plan Comments  MIST, debridement  -       User Key  (r) = Recorded By, (t) = Taken By, (c) = Cosigned By    Initials Name Provider Type    Laquita Ortega, PT Physical Therapist          Goals  PT OP Goals     Row Name 01/08/20 1345          Time Calculation    PT Goal Re-Cert Due Date  03/30/20  -       User Key  (r) = Recorded By, (t) = Taken By, (c) = Cosigned By    Initials Name Provider Type    Laquita Ortega, PT Physical Therapist          PT Goal Re-Cert Due Date: 03/30/20            Time Calculation: Start Time: 1345  Therapy Charges for Today     Code Description Service Date Service Provider Modifiers Qty    22789313141 HC EVERARDO DEBRIDE OPEN WOUND UP TO 20CM 1/8/2020 Laquita Matamoros, PT GP 1    [id number] HC PT  [facility] MIST 1/8/2020 Laquita Matamoros, PT GP 1                  Laquita Matamoros, PT  1/8/2020

## 2020-01-13 ENCOUNTER — HOSPITAL ENCOUNTER (OUTPATIENT)
Dept: PHYSICAL THERAPY | Facility: HOSPITAL | Age: 80
Setting detail: THERAPIES SERIES
Discharge: HOME | End: 2020-01-13

## 2020-01-13 DIAGNOSIS — S81.802D OPEN WOUND OF LEFT LOWER EXTREMITY, SUBSEQUENT ENCOUNTER: Primary | ICD-10-CM

## 2020-01-13 PROCEDURE — 97597 DBRDMT OPN WND 1ST 20 CM/<: CPT | Performed by: PHYSICAL THERAPIST

## 2020-01-13 NOTE — THERAPY WOUND CARE TREATMENT
Outpatient Rehabilitation - Wound/Debridement Treatment Note   [facility]     Patient Name: Joanne Tineo  : 1940  MRN: [id number]  Today's Date: 2020                 Admit Date: 2020    Visit Dx:    ICD-10-CM ICD-9-CM   1. Open wound of left lower extremity, subsequent encounter S81.802D V58.89     891.0       Patient Active Problem List   Diagnosis   • Depression   • Hearing impairment   • Hyperlipidemia   • Irritable bowel syndrome   • Left anterior fascicular block   • Macular degeneration   • Dyssomnia   • Vitamin B deficiency   • Hyperglycemia   • Family history of heart disease   • Vitamin D deficiency        Past Medical History:   Diagnosis Date   • Chronic anxiety Adulthood    Intermittent clonazepam   • Chronic lower back pain    • Depression 1984 - acute flare with 's death   • Fracture of pelvis (CMS/[facility])     Routine healing   • Hearing impairment     Corrects with bilateral hearing aids   • Hyperlipidemia     Total greater than 300   • Irritable bowel syndrome    • Macular degeneration    • Pneumonia     Treated outpatient   • Post menopausal syndrome    • Prediabetes     Hemoglobin A1c 6.0%.   • Sleep disorder    • Vitamin D deficiency         Past Surgical History:   Procedure Laterality Date   • COLONOSCOPY  ,    • DIAGNOSTIC LAPAROSCOPY  1965    for infertility   • TONSILLECTOMY  1943         PT Ortho     Row Name 20 0845       Subjective Comments    Subjective Comments  Pt reports changing dressing yesterday. No issues; looking better.   -MW       Subjective Pain    Able to rate subjective pain?  yes  -MW    Pre-Treatment Pain Level  0  -MW    Post-Treatment Pain Level  0  -MW       Transfers    Sit-Stand [place] (Transfers)  independent  -MW    Stand-Sit [place] (Transfers)  independent  -MW    Comment (Transfers)  Seated with foot propped for tx   -MW       Gait/Stairs Assessment/Training    [place]  "Level (Gait)  independent  -MW      User Key  (r) = Recorded By, (t) = Taken By, (c) = Cosigned By    Initials Name Provider Type    Lakesha Fonseca, PT Physical Therapist          [facility] Wound     Row Name 01/13/20 0845             Wound 12/31/19 1100 Left anterior leg Laceration    Wound - Properties Group Date first assessed: 12/31/19  - Time first assessed: 1100  - Side: Left  - Orientation: anterior  - Location: leg  - Primary Wound Type: Laceration  - Additional Comments: laceration from dog gate sustained 09/2019  -    Wound Image  Images linked: 1  -MW      Dressing Appearance  intact;moist drainage;other (see comments) Therahoney   -MW      Base  granulating;moist;pink;yellow;slough;white;epithelialization  -MW      Periwound  intact;moist;pink;dry  -MW      Periwound Temperature  warm  -MW      Periwound Skin Turgor  soft  -MW      Edges  irregular  -MW      Drainage Characteristics/Odor  serous;other (see comments) Therahoney   -MW      Drainage Amount  scant  -MW      Care, Wound  irrigated with;sterile normal saline;ultrasound therapy, non contact low frequency;debrided;cleansed with;wound cleanser;honey applied MIST x 8 min   -MW      Dressing Care, Wound  dressing applied;silver impregnated;foam;border dressing Mepilex AG, 4\" optifoam; Primafix   -MW      Periwound Care, Wound  cleansed with pH balanced cleanser;dry periwound area maintained  -MW        User Key  (r) = Recorded By, (t) = Taken By, (c) = Cosigned By    Initials Name Provider Type    Lakesha Fonseca, PT Physical Therapist    Laquita Ortega, PT Physical Therapist            WOUND DEBRIDEMENT  Total area of Debridement: ~3 cm2   Debridement Site 1  Location- Site 1: LLE  Selective Debridement- Site 1: Wound Surface <20cmsq  Instruments- Site 1: tweezers  Excised Tissue Description- Site 1: minimum, slough, moderate, other (comment)(mod skin crusts )  Bleeding- Site 1: none             Therapy Education     Row " Name 01/13/20 0845             Therapy Education    Education Details  Cont keeping dressing dry/ intact. Change prn moisture.   -MW      Given  Bandaging/dressing change  -MW      Program  Reinforced  -MW      How Provided  Verbal  -MW      Provided to  Patient  -MW      Level of Understanding  Verbalized  -MW        User Key  (r) = Recorded By, (t) = Taken By, (c) = Cosigned By    Initials Name Provider Type    Lakesha Fonseca, PT Physical Therapist          Recommendation and Plan  PT Assessment/Plan     Row Name 01/13/20 0845          PT Assessment    Functional Limitations  Performance in self-care ADL;Other (comment) wound mgmt  -MW     Impairments  Integumentary integrity  -MW     Assessment Comments  LLE wound with good progression of epithelial growth along wound edges; proximal aspect resurfaced. Only scant slough present in wound, which was debrided by PT. Cont Therahoney and silver foam for moist bacteriostatic healing environment. Consider D/C'ing MIST if progression continues; also may decrease frequency as progress continues.   -MW     Rehab Potential  Excellent  -MW     Patient/caregiver participated in establishment of treatment plan and goals  Yes  -MW     Patient would benefit from skilled therapy intervention  Yes  -MW        PT Plan    PT Frequency  2x/week;1x/week  -MW     Physical Therapy Interventions (Optional Details)  wound care;patient/family education  -MW     PT Plan Comments  Debridement, dressing management; ? MIST   -MW       User Key  (r) = Recorded By, (t) = Taken By, (c) = Cosigned By    Initials Name Provider Type    Lakesha Fonseca, PT Physical Therapist                     Time Calculation: Start Time: 0845  Therapy Charges for Today     Code Description Service Date Service Provider Modifiers Qty    26913274407 HC EVERARDO DEBRIDE OPEN WOUND UP TO 20CM 1/13/2020 Lakesha Wesley, PT GP 1                  Lakesha Wesley, PT  1/13/2020

## 2020-01-16 ENCOUNTER — HOSPITAL ENCOUNTER (OUTPATIENT)
Dept: PHYSICAL THERAPY | Facility: HOSPITAL | Age: 80
Setting detail: THERAPIES SERIES
Discharge: HOME | End: 2020-01-16

## 2020-01-16 DIAGNOSIS — S81.802D OPEN WOUND OF LEFT LOWER EXTREMITY, SUBSEQUENT ENCOUNTER: Primary | ICD-10-CM

## 2020-01-16 PROCEDURE — 97597 DBRDMT OPN WND 1ST 20 CM/<: CPT

## 2020-01-16 PROCEDURE — 97610 LOW FREQUENCY NON-THERMAL US: CPT

## 2020-01-16 NOTE — THERAPY WOUND CARE TREATMENT
Outpatient Rehabilitation - Wound/Debridement Treatment Note   [facility]     Patient Name: Joanne Tineo  : 1940  MRN: [id number]  Today's Date: 2020                 Admit Date: 2020    Visit Dx:    ICD-10-CM ICD-9-CM   1. Open wound of left lower extremity, subsequent encounter S81.802D V58.89     891.0       Patient Active Problem List   Diagnosis   • Depression   • Hearing impairment   • Hyperlipidemia   • Irritable bowel syndrome   • Left anterior fascicular block   • Macular degeneration   • Dyssomnia   • Vitamin B deficiency   • Hyperglycemia   • Family history of heart disease   • Vitamin D deficiency        Past Medical History:   Diagnosis Date   • Chronic anxiety Adulthood    Intermittent clonazepam   • Chronic lower back pain    • Depression 1984 - acute flare with 's death   • Fracture of pelvis (CMS/[facility])     Routine healing   • Hearing impairment 2014    Corrects with bilateral hearing aids   • Hyperlipidemia     Total greater than 300   • Irritable bowel syndrome    • Macular degeneration    • Pneumonia     Treated outpatient   • Post menopausal syndrome    • Prediabetes     Hemoglobin A1c 6.0%.   • Sleep disorder    • Vitamin D deficiency         Past Surgical History:   Procedure Laterality Date   • COLONOSCOPY  ,    • DIAGNOSTIC LAPAROSCOPY  1965    for infertility   • TONSILLECTOMY  1943         EVALUATION  PT Ortho     Row Name 20 0845       Subjective Comments    Subjective Comments  No complaints, kept dressing in place since last tx  -JM       Subjective Pain    Able to rate subjective pain?  yes  -JM    Pre-Treatment Pain Level  0  -JM    Post-Treatment Pain Level  0  -JM       Transfers    Sit-Stand [place] (Transfers)  independent  -JM    Stand-Sit [place] (Transfers)  independent  -JM    Comment (Transfers)  long sitting for tx  -JM       Gait/Stairs Assessment/Training    [place] Level (Gait)   "independent  -      User Key  (r) = Recorded By, (t) = Taken By, (c) = Cosigned By    Initials Name Provider Type    Laquita Ortega, PT Physical Therapist          LDA Wound     Row Name 01/16/20 0845             Wound 12/31/19 1100 Left anterior leg Laceration    Wound - Properties Group Date first assessed: 12/31/19  - Time first assessed: 1100  - Side: Left  - Orientation: anterior  - Location: leg  - Primary Wound Type: Laceration  - Additional Comments: laceration from dog gate sustained 09/2019  -    Wound Image  Images linked: 1  -JM      Dressing Appearance  intact;moist drainage  -      Base  granulating;moist;pink;yellow;slough;white;epithelialization  -      Periwound  intact;moist;pink;dry;macerated min maceration  -      Periwound Temperature  warm  -      Periwound Skin Turgor  soft  -      Edges  irregular  -      Wound Length (cm)  1.5 cm  -      Wound Width (cm)  0.6 cm  -      Wound Depth (cm)  0.1 cm  -      Drainage Characteristics/Odor  serous;other (see comments) Therahoney   -      Drainage Amount  scant  -      Care, Wound  cleansed with;wound cleanser;debrided;ultrasound therapy, non contact low frequency MIST 6min  -      Dressing Care, Wound  dressing applied;silver impregnated;foam;border dressing mepilex ag, 4\" optifoam, primafix tape  -      Periwound Care, Wound  cleansed with pH balanced cleanser;dry periwound area maintained  -        User Key  (r) = Recorded By, (t) = Taken By, (c) = Cosigned By    Initials Name Provider Type    Laquita Ortega, PT Physical Therapist            WOUND DEBRIDEMENT  Total area of Debridement: 2cmsq  Debridement Site 1  Location- Site 1: LLE  Selective Debridement- Site 1: Wound Surface <20cmsq  Instruments- Site 1: tweezers  Excised Tissue Description- Site 1: minimum, slough, other (comment)(dry crusts)  Bleeding- Site 1: none             Therapy Education     Row Name 01/16/20 0845             " Therapy Education    Education Details  d/c therahoney until next tx, change dressing if wet/disrupted  -LIBBY      Given  Bandaging/dressing change  -      Program  Reinforced  -      How Provided  Verbal  -      Provided to  Patient  -      Level of Understanding  Verbalized  -        User Key  (r) = Recorded By, (t) = Taken By, (c) = Cosigned By    Initials Name Provider Type    Laquita Ortega, PT Physical Therapist          Recommendation and Plan  PT Assessment/Plan     Row Name 01/16/20 0845          PT Assessment    Functional Limitations  Performance in self-care ADL;Other (comment) wound mgmt  -     Impairments  Integumentary integrity  -     Assessment Comments  Continued new epithelial growth, min thin slough layer buildup, base mostly granulation tissue.  Min maceration of edges, so d/c'd therahoney until next tx to assess response.  Pt making excellent progress with current POC.  -        PT Plan    PT Frequency  2x/week;1x/week  -     Physical Therapy Interventions (Optional Details)  patient/family education;wound care  -     PT Plan Comments  ? d/c MIST next tx, debridement, instruct in home management  -       User Key  (r) = Recorded By, (t) = Taken By, (c) = Cosigned By    Initials Name Provider Type    Laquita Ortega, PT Physical Therapist          Goals  PT OP Goals     Row Name 01/16/20 0845          Time Calculation    PT Goal Re-Cert Due Date  03/30/20  -       User Key  (r) = Recorded By, (t) = Taken By, (c) = Cosigned By    Initials Name Provider Type    Laquita Ortega, PT Physical Therapist          PT Goal Re-Cert Due Date: 03/30/20            Time Calculation: Start Time: 0845  Therapy Charges for Today     Code Description Service Date Service Provider Modifiers Qty    41491555632 HC PT [facility] MIST 1/16/2020 Laquita Matamoros, PT GP 1    63080903406 HC EVERARDO DEBRIDE OPEN WOUND UP TO 20CM 1/16/2020 Laquita Matamoros, PT GP 1                   Laquita Matamoros, PT  1/16/2020

## 2020-01-20 ENCOUNTER — HOSPITAL ENCOUNTER (OUTPATIENT)
Dept: PHYSICAL THERAPY | Facility: HOSPITAL | Age: 80
Setting detail: THERAPIES SERIES
Discharge: HOME | End: 2020-01-20

## 2020-01-20 DIAGNOSIS — S81.802D OPEN WOUND OF LEFT LOWER EXTREMITY, SUBSEQUENT ENCOUNTER: Primary | ICD-10-CM

## 2020-01-20 PROCEDURE — 97597 DBRDMT OPN WND 1ST 20 CM/<: CPT

## 2020-01-20 NOTE — THERAPY WOUND CARE TREATMENT
Outpatient Rehabilitation - Wound/Debridement Treatment Note   [facility]     Patient Name: Joanne Tineo  : 1940  MRN: [id number]  Today's Date: 2020                 Admit Date: 2020    Visit Dx:    ICD-10-CM ICD-9-CM   1. Open wound of left lower extremity, subsequent encounter S81.802D V58.89     891.0       Patient Active Problem List   Diagnosis   • Depression   • Hearing impairment   • Hyperlipidemia   • Irritable bowel syndrome   • Left anterior fascicular block   • Macular degeneration   • Dyssomnia   • Vitamin B deficiency   • Hyperglycemia   • Family history of heart disease   • Vitamin D deficiency        Past Medical History:   Diagnosis Date   • Chronic anxiety Adulthood    Intermittent clonazepam   • Chronic lower back pain    • Depression 1984 - acute flare with 's death   • Fracture of pelvis (CMS/[facility])     Routine healing   • Hearing impairment 2014    Corrects with bilateral hearing aids   • Hyperlipidemia     Total greater than 300   • Irritable bowel syndrome    • Macular degeneration    • Pneumonia     Treated outpatient   • Post menopausal syndrome    • Prediabetes     Hemoglobin A1c 6.0%.   • Sleep disorder    • Vitamin D deficiency         Past Surgical History:   Procedure Laterality Date   • COLONOSCOPY  2016   • DIAGNOSTIC LAPAROSCOPY  1965    for infertility   • TONSILLECTOMY  1943         EVALUATION  PT Ortho     Row Name 20 0900       Subjective Comments    Subjective Comments     -MC    Row Name 20 0845       Subjective Comments    Subjective Comments  No complaints or changes. Changed the bandage since last session without difficulty.  -       Subjective Pain    Able to rate subjective pain?  yes  -MC    Pre-Treatment Pain Level  0  -MC    Post-Treatment Pain Level  0  -MC       Transfers    Sit-Stand [place] (Transfers)  independent  -    Stand-Sit [place] (Transfers)  independent  -     "Comment (Transfers)  long sitting for tx  -       Gait/Stairs Assessment/Training    [place] Level (Gait)  independent  -      User Key  (r) = Recorded By, (t) = Taken By, (c) = Cosigned By    Initials Name Provider Type    Pam Ricardo, PT Physical Therapist          LDA Wound     Row Name 01/20/20 0845             Wound 12/31/19 1100 Left anterior leg Laceration    Wound - Properties Group Date first assessed: 12/31/19  - Time first assessed: 1100  - Side: Left  - Orientation: anterior  - Location: leg  - Primary Wound Type: Laceration  - Additional Comments: laceration from dog gate sustained 09/2019  -    Dressing Appearance  intact;moist drainage  -      Base  granulating;moist;pink;yellow;slough;white;epithelialization skin bridging throughout, 2-3 small open areas remain  -      Periwound  intact;moist;pink;dry;macerated min maceration  -      Periwound Temperature  warm  -      Periwound Skin Turgor  soft  -      Edges  irregular  -      Drainage Characteristics/Odor  serous  -      Drainage Amount  scant  -      Care, Wound  cleansed with;wound cleanser;debrided  -      Dressing Care, Wound  dressing applied;silver impregnated;low-adherent;foam;border dressing mepilex Ag, 4\" optifoam, primafix tape  -      Periwound Care, Wound  cleansed with pH balanced cleanser;dry periwound area maintained  -        User Key  (r) = Recorded By, (t) = Taken By, (c) = Cosigned By    Initials Name Provider Type    Pam Ricardo, PT Physical Therapist    Laquita Ortega, PT Physical Therapist            WOUND DEBRIDEMENT  Total area of Debridement: 2 cm2  Debridement Site 1  Location- Site 1: LLE  Selective Debridement- Site 1: Wound Surface <20cmsq  Instruments- Site 1: tweezers  Excised Tissue Description- Site 1: minimum, slough, other (comment)(crusting)  Bleeding- Site 1: none             Therapy Education     Row Name 01/20/20 0845             Therapy " Education    Education Details  Hold MIST until next follow up, may decrease frequency if progress continues. May keep dressing intact until next tx if not disrupted.  -      Given  Bandaging/dressing change  -      Program  Reinforced  -      How Provided  Verbal  -MC      Provided to  Patient  -      Level of Understanding  Verbalized  -        User Key  (r) = Recorded By, (t) = Taken By, (c) = Cosigned By    Initials Name Provider Type    Pam Ricardo, PT Physical Therapist          Recommendation and Plan  PT Assessment/Plan     Row Name 01/20/20 0845          PT Assessment    Functional Limitations  Performance in self-care ADL;Other (comment) wound mgmt  -     Impairments  Integumentary integrity  -     Assessment Comments  Pt with improved wound bed status, with new epithelialization and only 2-3 small open areas remaining. Periwound maceration is resolved with d/c of honey last treatment. Pt appears to be progressing well. MIST held today to assess response and potential readiness for home management.  -     Rehab Potential  Excellent  -     Patient/caregiver participated in establishment of treatment plan and goals  Yes  -     Patient would benefit from skilled therapy intervention  Yes  -MC        PT Plan    PT Frequency  2x/week;1x/week  -     Physical Therapy Interventions (Optional Details)  patient/family education;wound care  -     PT Plan Comments  MIST on hold. If progress continued at next follow up, ?tentative d/c to home management.  -       User Key  (r) = Recorded By, (t) = Taken By, (c) = Cosigned By    Initials Name Provider Type    Pam Ricardo, PT Physical Therapist          Goals  PT OP Goals     Row Name 01/20/20 0861          Time Calculation    PT Goal Re-Cert Due Date  03/30/20  -       User Key  (r) = Recorded By, (t) = Taken By, (c) = Cosigned By    Initials Name Provider Type    Pam Ricardo, PT Physical Therapist          PT  Goal Re-Cert Due Date: 03/30/20            Time Calculation: Start Time: 0845  Therapy Charges for Today     Code Description Service Date Service Provider Modifiers Qty    [id number] HC EVERARDO DEBRIDE OPEN WOUND UP TO 20CM 1/20/2020 Pam Oseguera, PT GP 1                  Pam Oseguera, PT  1/20/2020

## 2020-01-23 ENCOUNTER — HOSPITAL ENCOUNTER (OUTPATIENT)
Dept: PHYSICAL THERAPY | Facility: HOSPITAL | Age: 80
Setting detail: THERAPIES SERIES
Discharge: HOME | End: 2020-01-23

## 2020-01-23 DIAGNOSIS — S81.802D OPEN WOUND OF LEFT LOWER EXTREMITY, SUBSEQUENT ENCOUNTER: Primary | ICD-10-CM

## 2020-01-23 PROCEDURE — 97535 SELF CARE MNGMENT TRAINING: CPT | Performed by: PHYSICAL THERAPIST

## 2020-01-23 NOTE — THERAPY WOUND CARE TREATMENT
Outpatient Rehabilitation - Wound/Debridement Treatment Note   [place]     Patient Name: Joanne Tineo  : 1940  MRN: [id number]  Today's Date: 2020                 Admit Date: 2020    Visit Dx:    ICD-10-CM ICD-9-CM   1. Open wound of left lower extremity, subsequent encounter S81.802D V58.89     891.0       Patient Active Problem List   Diagnosis   • Depression   • Hearing impairment   • Hyperlipidemia   • Irritable bowel syndrome   • Left anterior fascicular block   • Macular degeneration   • Dyssomnia   • Vitamin B deficiency   • Hyperglycemia   • Family history of heart disease   • Vitamin D deficiency        Past Medical History:   Diagnosis Date   • Chronic anxiety Adulthood    Intermittent clonazepam   • Chronic lower back pain    • Depression 1984 - acute flare with 's death   • Fracture of pelvis (CMS/[facility])     Routine healing   • Hearing impairment 2014    Corrects with bilateral hearing aids   • Hyperlipidemia     Total greater than 300   • Irritable bowel syndrome    • Macular degeneration    • Pneumonia     Treated outpatient   • Post menopausal syndrome    • Prediabetes     Hemoglobin A1c 6.0%.   • Sleep disorder    • Vitamin D deficiency         Past Surgical History:   Procedure Laterality Date   • COLONOSCOPY  ,    • DIAGNOSTIC LAPAROSCOPY  1965    for infertility   • TONSILLECTOMY  1943           PT Ortho     Row Name 20 0845       Subjective Comments    Subjective Comments  Dressing has remained in place since last PT visit.   -MW       Subjective Pain    Able to rate subjective pain?  yes  -MW    Pre-Treatment Pain Level  0  -MW    Post-Treatment Pain Level  0  -MW       Transfers    Sit-Stand [place] (Transfers)  independent  -MW    Stand-Sit [place] (Transfers)  independent  -MW    Comment (Transfers)  seated for tx   -MW       Gait/Stairs Assessment/Training    [place] Level (Gait)  independent  -MW  "     User Key  (r) = Recorded By, (t) = Taken By, (c) = Cosigned By    Initials Name Provider Type    Lakesha Fonseca, PT Physical Therapist          LDA Wound     Row Name 01/23/20 0845             Wound 12/31/19 1100 Left anterior leg Laceration    Wound - Properties Group Date first assessed: 12/31/19  - Time first assessed: 1100  -JM Side: Left  - Orientation: anterior  - Location: leg  - Primary Wound Type: Laceration  - Additional Comments: laceration from dog gate sustained 09/2019  -    Wound Image  Images linked: 1  -MW      Dressing Appearance  intact;moist drainage  -MW      Base  granulating;moist;pink;epithelialization 2 small areas remain   -MW      Periwound  intact;pink;dry  -MW      Periwound Temperature  warm  -MW      Periwound Skin Turgor  soft  -MW      Edges  irregular  -MW      Drainage Characteristics/Odor  serous  -MW      Drainage Amount  scant  -MW      Care, Wound  cleansed with;wound cleanser  -MW      Dressing Care, Wound  dressing applied;silver impregnated;low-adherent;foam;border dressing Mepilex AG, 4\" optifoam, Primafix   -MW      Periwound Care, Wound  cleansed with pH balanced cleanser;dry periwound area maintained  -MW        User Key  (r) = Recorded By, (t) = Taken By, (c) = Cosigned By    Initials Name Provider Type    Lakesha Fonseca, PT Physical Therapist    Laquita Ortega, SNOW Physical Therapist                           Therapy Education     Row Name 01/23/20 0845             Therapy Education    Education Details  Pt to change dressing Sun/Mon; return to PT Thurs for final check. Pt may shower, then remove dressing, clean with Skintegrity spray; place Mepilex foam and optifoam. Primafix to secure prn.   -MW      Given  Bandaging/dressing change  -MW      Program  Reinforced  -MW      How Provided  Verbal;Demonstration  -MW      Provided to  Patient  -MW      Level of Understanding  Verbalized;Teach back education performed  -MW      39119 - PT " Self Care/Mgmt Minutes  8  -MW        User Key  (r) = Recorded By, (t) = Taken By, (c) = Cosigned By    Initials Name Provider Type    Lakesha Fonseca, PT Physical Therapist          Recommendation and Plan  PT Assessment/Plan     Row Name 01/23/20 0845          PT Assessment    Functional Limitations  Performance in self-care ADL;Other (comment) wound mgmt  -MW     Impairments  Integumentary integrity  -MW     Assessment Comments  LLE wound continues to heal with only 2 areas remaining open; total area approximately 0.3 x 0.2 cm. Expect to discharge next visit as would should be fully re-epithelialized. Pt to change dressing between PT visits, using Mepilex AG to provide moist balance and antimicrobial environment.   -MW     Rehab Potential  Excellent  -MW     Patient/caregiver participated in establishment of treatment plan and goals  Yes  -MW     Patient would benefit from skilled therapy intervention  Yes  -MW        PT Plan    PT Frequency  1x/week  -MW     Physical Therapy Interventions (Optional Details)  wound care;patient/family education  -     PT Plan Comments  Follow up wound check, expect to d/c   -MW       User Key  (r) = Recorded By, (t) = Taken By, (c) = Cosigned By    Initials Name Provider Type    Lakesha Fonseca, PT Physical Therapist                         Time Calculation: Start Time: 0845  Total Timed Code Minutes- PT: 8 minute(s)  Therapy Charges for Today     Code Description Service Date Service Provider Modifiers Qty    11430321764 HC PT SELF CARE/MGMT/TRAIN EA 15 MIN 1/23/2020 Lakesha Wesley, PT GP 1                  Lakesha Wesley, PT  1/23/2020

## 2020-01-30 ENCOUNTER — HOSPITAL ENCOUNTER (OUTPATIENT)
Dept: PHYSICAL THERAPY | Facility: HOSPITAL | Age: 80
Discharge: HOME | End: 2020-01-30
Admitting: INTERNAL MEDICINE

## 2020-01-30 DIAGNOSIS — S81.802D OPEN WOUND OF LEFT LOWER EXTREMITY, SUBSEQUENT ENCOUNTER: Primary | ICD-10-CM

## 2020-01-30 PROCEDURE — 97597 DBRDMT OPN WND 1ST 20 CM/<: CPT

## 2020-01-30 NOTE — THERAPY WOUND CARE TREATMENT
Outpatient Rehabilitation - Wound/Debridement Treatment Note   [place]     Patient Name: Joanne Tineo  : 1940  MRN: [id number]  Today's Date: 2020                 Admit Date: 2020    Visit Dx:    ICD-10-CM ICD-9-CM   1. Open wound of left lower extremity, subsequent encounter S81.802D V58.89     891.0       Patient Active Problem List   Diagnosis   • Depression   • Hearing impairment   • Hyperlipidemia   • Irritable bowel syndrome   • Left anterior fascicular block   • Macular degeneration   • Dyssomnia   • Vitamin B deficiency   • Hyperglycemia   • Family history of heart disease   • Vitamin D deficiency        Past Medical History:   Diagnosis Date   • Chronic anxiety Adulthood    Intermittent clonazepam   • Chronic lower back pain    • Depression 1984 - acute flare with 's death   • Fracture of pelvis (CMS/[facility])     Routine healing   • Hearing impairment     Corrects with bilateral hearing aids   • Hyperlipidemia     Total greater than 300   • Irritable bowel syndrome    • Macular degeneration    • Pneumonia     Treated outpatient   • Post menopausal syndrome    • Prediabetes     Hemoglobin A1c 6.0%.   • Sleep disorder    • Vitamin D deficiency         Past Surgical History:   Procedure Laterality Date   • COLONOSCOPY  2016   • DIAGNOSTIC LAPAROSCOPY  1965    for infertility   • TONSILLECTOMY  1943         EVALUATION  PT Ortho     Row Name 20 0850       Subjective Comments    Subjective Comments  No issues or complaints with dressing changes. Report she did get the bandage wet in the shower. She reports a new small bump around the wound area, but reports she has not seen any drainage.  -MC       Subjective Pain    Able to rate subjective pain?  yes  -MC    Pre-Treatment Pain Level  0  -MC    Post-Treatment Pain Level  0  -MC       Transfers    Sit-Stand [place] (Transfers)  independent  -MC    Stand-Sit [place] (Transfers)  " independent  -    Comment (Transfers)  seated for tx  -       Gait/Stairs Assessment/Training    [place] Level (Gait)  independent  -      User Key  (r) = Recorded By, (t) = Taken By, (c) = Cosigned By    Initials Name Provider Type    Pam Ricardo, PT Physical Therapist          LDA Wound     Row Name 01/30/20 0850             Wound 12/31/19 1100 Left anterior leg Laceration    Wound - Properties Group Date first assessed: 12/31/19  - Time first assessed: 1100  - Side: Left  - Orientation: anterior  - Location: leg  - Primary Wound Type: Laceration  - Additional Comments: laceration from dog gate sustained 09/2019  -    Wound Image  Images linked: 1  -      Dressing Appearance  intact;moist drainage  -      Base  pink;epithelialization;closed/resurfaced;dry  -      Periwound  intact;pink;dry;other (see comments) small raised area midline/distal to the wound  -      Periwound Temperature  warm  -      Periwound Skin Turgor  soft  -      Edges  irregular  -      Drainage Amount  none  -      Care, Wound  cleansed with;wound cleanser;debrided  -      Dressing Care, Wound  dressing applied;low-adherent;foam;border dressing 4\" optifoam  -      Periwound Care, Wound  cleansed with pH balanced cleanser;dry periwound area maintained  -        User Key  (r) = Recorded By, (t) = Taken By, (c) = Cosigned By    Initials Name Provider Type    Pam Ricardo, PT Physical Therapist    Laquita Ortega, PT Physical Therapist            WOUND DEBRIDEMENT  Total area of Debridement: 1 cm2  Debridement Site 1  Location- Site 1: LLE  Selective Debridement- Site 1: Wound Surface <20cmsq  Instruments- Site 1: tweezers  Excised Tissue Description- Site 1: minimum, other (comment)(hypertrophic crust over newly closed skin)  Bleeding- Site 1: none             Therapy Education     Row Name 01/30/20 0850             Therapy Education    Education Details  Keep area covered " for approximately one week. Then OK to leave MESFIN if no drainage/open areas noted. Treat area gently - no scrubbing or shaving until skin more mature. Call within 30 days for follow up if the area reopens.  -      Given  Bandaging/dressing change  -      Program  Progressed  -      How Provided  Verbal;Demonstration  -MC      Provided to  Patient  -      Level of Understanding  Verbalized;Teach back education performed  -        User Key  (r) = Recorded By, (t) = Taken By, (c) = Cosigned By    Initials Name Provider Type    Pam Ricardo PT Physical Therapist          Recommendation and Plan  PT Assessment/Plan     Row Name 01/30/20 0850          PT Assessment    Functional Limitations  Performance in self-care ADL;Other (comment) wound mgmt  -     Impairments  Integumentary integrity  -     Assessment Comments  Pt with no remaining open areas. The skin is intact but somewhat fragile. Pt with small raised area near the original wound, but no drainage/redness. Asked pt to monitor this area carefully. Pt will be tentatively d/c today.  -     Patient would benefit from skilled therapy intervention  Yes  -        PT Plan    PT Frequency  Other (comment) prn within 30 days  -     Physical Therapy Interventions (Optional Details)  patient/family education;wound care  -     PT Plan Comments  tentative d/c  -       User Key  (r) = Recorded By, (t) = Taken By, (c) = Cosigned By    Initials Name Provider Type    Pam Ricardo PT Physical Therapist          Goals  PT OP Goals     Row Name 01/30/20 0850          Time Calculation    PT Goal Re-Cert Due Date  03/30/20  -       User Key  (r) = Recorded By, (t) = Taken By, (c) = Cosigned By    Initials Name Provider Type    Pam Ricardo PT Physical Therapist          PT Goal Re-Cert Due Date: 03/30/20            Time Calculation: Start Time: 0850  Therapy Charges for Today     Code Description Service Date Service Provider  Modifiers Qty    19724206355 HC EVERARDO DEBRIDE OPEN WOUND UP TO 20CM 1/30/2020 Pam Oseguera, PT GP 1                  Pam Oseguera, PT  1/30/2020

## 2020-02-24 ENCOUNTER — HOSPITAL ENCOUNTER (OUTPATIENT)
Dept: PHYSICAL THERAPY | Facility: HOSPITAL | Age: 80
Setting detail: THERAPIES SERIES
Discharge: HOME | End: 2020-02-24

## 2020-02-24 DIAGNOSIS — S81.802D OPEN WOUND OF LEFT LOWER EXTREMITY, SUBSEQUENT ENCOUNTER: Primary | ICD-10-CM

## 2020-02-24 PROCEDURE — 97597 DBRDMT OPN WND 1ST 20 CM/<: CPT

## 2020-02-24 NOTE — THERAPY PROGRESS REPORT/RE-CERT
Outpatient Rehabilitation - Wound/Debridement Progress Note   [place]     Patient Name: Joanne Tineo  : 1940  MRN: [id number]  Today's Date: 2020                   Admit Date: 2020    Visit Dx:    ICD-10-CM ICD-9-CM   1. Open wound of left lower extremity, subsequent encounter S81.802D V58.89     891.0       Patient Active Problem List   Diagnosis   • Depression   • Hearing impairment   • Hyperlipidemia   • Irritable bowel syndrome   • Left anterior fascicular block   • Macular degeneration   • Dyssomnia   • Vitamin B deficiency   • Hyperglycemia   • Family history of heart disease   • Vitamin D deficiency        Past Medical History:   Diagnosis Date   • Chronic anxiety Adulthood    Intermittent clonazepam   • Chronic lower back pain    • Depression 1984 - acute flare with 's death   • Fracture of pelvis (CMS/[facility])     Routine healing   • Hearing impairment     Corrects with bilateral hearing aids   • Hyperlipidemia     Total greater than 300   • Irritable bowel syndrome    • Macular degeneration    • Pneumonia     Treated outpatient   • Post menopausal syndrome    • Prediabetes     Hemoglobin A1c 6.0%.   • Sleep disorder    • Vitamin D deficiency         Past Surgical History:   Procedure Laterality Date   • COLONOSCOPY  ,    • DIAGNOSTIC LAPAROSCOPY  1965    for infertility   • TONSILLECTOMY  1943         EVALUATION  PT Ortho     Row Name 20 0845       Subjective Comments    Subjective Comments  Pt reports the original area is closed, but the small bump that appeared toward the end of her healing is still there and raised, and she would like it checked out.  -MC       Subjective Pain    Able to rate subjective pain?  yes  -MC    Pre-Treatment Pain Level  0  -MC    Post-Treatment Pain Level  3  -MC       Transfers    Sit-Stand [place] (Transfers)  independent  -MC    Stand-Sit [place] (Transfers)  independent  -     "Comment (Transfers)  seated for tx  -       Gait/Stairs Assessment/Training    [place] Level (Gait)  independent  -      User Key  (r) = Recorded By, (t) = Taken By, (c) = Cosigned By    Initials Name Provider Type    Pam Ricardo PT Physical Therapist          LDA Wound     Row Name 02/24/20 0845             Wound 02/24/20 0845 Left anterior;lateral leg Abcess    Wound - Properties Group Date first assessed: 02/24/20  - Time first assessed: 0845  - Present on Hospital Admission: N  - Side: Left  - Orientation: anterior;lateral  - Location: leg  - Primary Wound Type: Abcess  -    Wound Image  Images linked: 2  -      Dressing Appearance  intact;no drainage mepilex Ag, simple bandaid  -      Base  clean;moist;red;granulating granulation buds in base, entire area is raised  -      Periwound  intact;dry;pink;redness not acute redness  -      Periwound Temperature  warm  -      Periwound Skin Turgor  soft  -      Edges  irregular;rolled/closed appear slightly rolled, ?lifted from base  -      Wound Length (cm)  0.4 cm  -      Wound Width (cm)  0.4 cm  -      Wound Depth (cm)  0.1 cm  -      Drainage Characteristics/Odor  serosanguineous  -      Drainage Amount  small  -      Care, Wound  cleansed with;wound cleanser;debrided  -      Dressing Care, Wound  dressing applied;silver impregnated;low-adherent;foam;border dressing mepilex Ag, 4\" optifoam  -      Periwound Care, Wound  cleansed with pH balanced cleanser;dry periwound area maintained  -         [REMOVED] Wound 12/31/19 1100 Left anterior leg Laceration    Wound - Properties Group Date first assessed: 12/31/19  - Time first assessed: 1100  - Side: Left  - Orientation: anterior  - Location: leg  - Primary Wound Type: Laceration  - Additional Comments: laceration from dog gate sustained 09/2019  - Resolution Date: 02/24/20  - Resolution Time: 0845  - Wound Outcome: Healed  -    " "Dressing Appearance  open to air  -      Base  closed/resurfaced;epithelialization  -        User Key  (r) = Recorded By, (t) = Taken By, (c) = Cosigned By    Initials Name Provider Type    Pam Ricardo, PT Physical Therapist    Laquita Ortega, PT Physical Therapist            WOUND DEBRIDEMENT  Total area of Debridement: 1 cm2  Debridement Site 1  Location- Site 1: LLE  Selective Debridement- Site 1: Wound Surface <20cmsq  Instruments- Site 1: tweezers  Excised Tissue Description- Site 1: moderate, other (comment)(hypertrophic crust)  Bleeding- Site 1: scant, held pressure, 1 minute             Therapy Education     Row Name 02/24/20 3109             Therapy Education    Education Details  Reviewed s/sx of infection. Encouraged pt to make an appt with her dermatologist to assess the area d/t the area being raised and having no external trauma to cause the open spot. Change bandage every 2-3 days or prn if wet/disrupted. Clean with skintegrity/gauze, cover with mepilex Ag and 4\" optifoam. Call with any questions or concerns.  -MC      Given  Bandaging/dressing change  -      Program  Progressed;Modified  -      How Provided  Verbal;Demonstration  -MC      Provided to  Patient  -MC      Level of Understanding  Verbalized;Teach back education performed  -        User Key  (r) = Recorded By, (t) = Taken By, (c) = Cosigned By    Initials Name Provider Type    Pam Ricardo, PT Physical Therapist          Recommendation and Plan  PT Assessment/Plan     Row Name 02/24/20 7881          PT Assessment    Functional Limitations  Performance in self-care ADL;Other (comment) wound mgmt  -     Impairments  Integumentary integrity  -     Assessment Comments  Pt returns with concern for the small raised area that had appeared near her original wound prior to tentative d/c. She reports the area has not changed much, but she wants it checked. The original wound area is closed at this time, and " the skin integrity appears appropriate. The new, raised area initially looked like it may have been a sebaceous cyst, as when a small amount of pressure was applied, some white, thick, sebum-like material came out. However, when PT debrided the crust from the area, there appears to be a small, raised area of granulation with some overlying, partially-rolled skin overlying it. There is no colorful drainage, odor, or smell indicative of infection. PT recommends being assessed by a dermatologist due to the unusual, raised nature of the area. Unless the dermatologist establishes an alternative POC, pt will benefit from skilled PT wound care for debridement prn, dressings management, and other appropriate interventions to promote healing.  -     Rehab Potential  Excellent  -     Patient/caregiver participated in establishment of treatment plan and goals  Yes  -     Patient would benefit from skilled therapy intervention  Yes  -        PT Plan    PT Frequency  Other (comment);1x/week every 1-2 weeks  -     Predicted Duration of Therapy Intervention (Therapy Eval)  10 visits  -     Planned CPT's?  PT SELF CARE/MGMT/TRAIN 15 MIN: 03872;PT NONSELECT DEBRIDE 15 MIN: 34679;PT EVERARDO DEBRIDE OPEN WOUND UP TO 20 CM: 61497;PT NLFU MIST: 90224  -     Physical Therapy Interventions (Optional Details)  patient/family education;wound care  -     PT Plan Comments  debridement, dressings, ?MIST if indicated  -       User Key  (r) = Recorded By, (t) = Taken By, (c) = Cosigned By    Initials Name Provider Type    Pam Ricardo, PT Physical Therapist          Goals  PT OP Goals     Row Name 02/24/20 0831          PT Short Term Goals    STG 1  Patient and/ or caregiver able to verbalize signs and symptoms of infection.  -     STG 1 Progress  Met  -MC     STG 2  Decrease non-viable / necrotic tissue by 50% to facilitate clean wound bed for healing.  -     STG 2 Progress  Met  -     STG 3  Decrease wound  dimensions by 50% as evidence of wound closure.  -     STG 3 Progress  New  -     STG 3 Progress Comments  -- new for new wound  -        Long Term Goals    LTG 1  Patient and/ or caregiver independent with clean dressing changes.  -     LTG 1 Progress  Ongoing  -     LTG 2  Decrease non-viable / necrotic tissue by 75% to facilitate clean wound bed for healing.  -     LTG 2 Progress  Met  -     LTG 3  Decrease wound dimensions by 75% as evidence of wound closure.  -     LTG 3 Progress  New For new open area  -        Time Calculation    PT Goal Re-Cert Due Date  03/30/20  -       User Key  (r) = Recorded By, (t) = Taken By, (c) = Cosigned By    Initials Name Provider Type    Pam Ricardo, PT Physical Therapist          PT Goal Re-Cert Due Date: 03/30/20  PT Short Term Goals  STG 1: Patient and/ or caregiver able to verbalize signs and symptoms of infection.  STG 1 Progress: Met  STG 2: Decrease non-viable / necrotic tissue by 50% to facilitate clean wound bed for healing.  STG 2 Progress: Met  STG 3: Decrease wound dimensions by 50% as evidence of wound closure.  STG 3 Progress: New  STG 3 Progress Comments: (new for new wound)  Long Term Goals  LTG 1: Patient and/ or caregiver independent with clean dressing changes.  LTG 1 Progress: Ongoing  LTG 2: Decrease non-viable / necrotic tissue by 75% to facilitate clean wound bed for healing.  LTG 2 Progress: Met  LTG 3: Decrease wound dimensions by 75% as evidence of wound closure.  LTG 3 Progress: New(For new open area)      Time Calculation: Start Time: 0845  Therapy Charges for Today     Code Description Service Date Service Provider Modifiers Qty    83866404343  EVERARDO DEBRIDE OPEN WOUND UP TO 20CM 2/24/2020 Pam Oseguera, PT GP 1                  Pam Oseguera, PT  2/24/2020

## 2020-03-11 ENCOUNTER — TELEPHONE (OUTPATIENT)
Dept: INTERNAL MEDICINE | Facility: CLINIC | Age: 80
End: 2020-03-11

## 2020-03-29 ENCOUNTER — DOCUMENTATION (OUTPATIENT)
Dept: PHYSICAL THERAPY | Facility: HOSPITAL | Age: 80
End: 2020-03-29

## 2020-03-29 DIAGNOSIS — S81.802D OPEN WOUND OF LEFT LOWER EXTREMITY, SUBSEQUENT ENCOUNTER: Primary | ICD-10-CM

## 2020-03-29 NOTE — THERAPY DISCHARGE NOTE
Outpatient Rehabilitation - Wound/Debridement Discharge Summary       Patient Name: Joanne Tineo  : 1940  MRN: [id number]  Today's Date: 3/29/2020                  Admit Date: (Not on file)    Visit Dx:    ICD-10-CM ICD-9-CM   1. Open wound of left lower extremity, subsequent encounter S81.802D V58.89     891.0       Patient Active Problem List   Diagnosis   • Depression   • Hearing impairment   • Hyperlipidemia   • Irritable bowel syndrome   • Left anterior fascicular block   • Macular degeneration   • Dyssomnia   • Vitamin B deficiency   • Hyperglycemia   • Family history of heart disease   • Vitamin D deficiency        Past Medical History:   Diagnosis Date   • Chronic anxiety Adulthood    Intermittent clonazepam   • Chronic lower back pain    • Depression 1984 - acute flare with 's death   • Fracture of pelvis (CMS/[facility])     Routine healing   • Hearing impairment 2014    Corrects with bilateral hearing aids   • Hyperlipidemia     Total greater than 300   • Irritable bowel syndrome    • Macular degeneration    • Pneumonia 2016    Treated outpatient   • Post menopausal syndrome    • Prediabetes     Hemoglobin A1c 6.0%.   • Sleep disorder    • Vitamin D deficiency         Past Surgical History:   Procedure Laterality Date   • COLONOSCOPY  ,    • DIAGNOSTIC LAPAROSCOPY  1965    for infertility   • TONSILLECTOMY  1943       Goals  PT OP Goals     Row Name 20 1000          PT Short Term Goals    STG 1  Patient and/ or caregiver able to verbalize signs and symptoms of infection.  -     STG 1 Progress  Met  -     STG 2  Decrease non-viable / necrotic tissue by 50% to facilitate clean wound bed for healing.  -     STG 2 Progress  Met  -     STG 3  Decrease wound dimensions by 50% as evidence of wound closure.  -     STG 3 Progress  Not Met  -     STG 3 Progress Comments  goal established for new wound as of , pt did not return for tx  -         Long Term Goals    LTG 1  Patient and/ or caregiver independent with clean dressing changes.  -     LTG 1 Progress  Met  -     LTG 2  Decrease non-viable / necrotic tissue by 75% to facilitate clean wound bed for healing.  -     LTG 2 Progress  Met  -     LTG 3  Decrease wound dimensions by 75% as evidence of wound closure.  -     LTG 3 Progress  Not Met For new open area  -       User Key  (r) = Recorded By, (t) = Taken By, (c) = Cosigned By    Initials Name Provider Type    Laquita Ortega, PT Physical Therapist          OP Discharge Summary     Row Name 03/29/20 1048             OP PT Discharge Summary    Date of Discharge  03/29/20  -      Reason for Discharge  other (comment) Pt has not returned for tx in 30 days  -      Outcomes Achieved  Patient able to partially acheive established goals  -      Discharge Destination  Home with home program  -      Discharge Instructions/Additional Comments  Original LLE wound had healed as of last tx, but pt had new area that PT recommended evaluation by Dermatology.  Pt has not since returned for tx.  -        User Key  (r) = Recorded By, (t) = Taken By, (c) = Cosigned By    Initials Name Provider Type    Laquita Ortega, PT Physical Therapist          Laquita Matamoros, PT  3/29/2020

## 2020-06-05 ENCOUNTER — TRANSCRIBE ORDERS (OUTPATIENT)
Dept: PHYSICAL THERAPY | Facility: HOSPITAL | Age: 80
End: 2020-06-05

## 2020-06-05 DIAGNOSIS — C44.701: Primary | ICD-10-CM

## 2020-06-08 ENCOUNTER — HOSPITAL ENCOUNTER (OUTPATIENT)
Dept: PHYSICAL THERAPY | Facility: HOSPITAL | Age: 80
Setting detail: THERAPIES SERIES
Discharge: HOME | End: 2020-06-08

## 2020-06-08 DIAGNOSIS — S81.802D OPEN WOUND OF LEFT LOWER EXTREMITY, SUBSEQUENT ENCOUNTER: Primary | ICD-10-CM

## 2020-06-08 PROCEDURE — 97597 DBRDMT OPN WND 1ST 20 CM/<: CPT | Performed by: PHYSICAL THERAPIST

## 2020-06-08 PROCEDURE — 97162 PT EVAL MOD COMPLEX 30 MIN: CPT | Performed by: PHYSICAL THERAPIST

## 2020-06-08 NOTE — THERAPY EVALUATION
Outpatient Rehabilitation - Wound/Debridement Initial Eval   [facility]     Patient Name: Joanne Tineo  : 1940  MRN: [id number]  Today's Date: 2020                  Admit Date: 2020    Visit Dx:    ICD-10-CM ICD-9-CM   1. Open wound of left lower extremity, subsequent encounter S81.802D V58.89     891.0       Patient Active Problem List   Diagnosis   • Depression   • Hearing impairment   • Hyperlipidemia   • Irritable bowel syndrome   • Left anterior fascicular block   • Macular degeneration   • Dyssomnia   • Vitamin B deficiency   • Hyperglycemia   • Family history of heart disease   • Vitamin D deficiency        Past Medical History:   Diagnosis Date   • Chronic anxiety Adulthood    Intermittent clonazepam   • Chronic lower back pain    • Depression 1984 - acute flare with 's death   • Fracture of pelvis (CMS/[facility], located [facility])     Routine healing   • Hearing impairment 2014    Corrects with bilateral hearing aids   • Hyperlipidemia     Total greater than 300   • Irritable bowel syndrome    • Macular degeneration    • Pneumonia 2016    Treated outpatient   • Post menopausal syndrome    • Prediabetes     Hemoglobin A1c 6.0%.   • Sleep disorder    • Vitamin D deficiency         Past Surgical History:   Procedure Laterality Date   • COLONOSCOPY  ,    • DIAGNOSTIC LAPAROSCOPY  1965    for infertility   • TONSILLECTOMY              Patient History     Row Name 20 1015          Chief Complaint  Ulcer, wound or other skin conditions  -MW    Date Current Problem(s) Began  20  -MW    Brief Description of Current Complaint  Pt reports was seen by dermatology in April after completion of PT Wound care for previous leg ulcer. Dermatology informed her that it needed to be removed, as it was a skin CA. Had surgical excision with skin grafting at . Wound has continued to drain with poor healing. Pt requested to return to PT wound care for assistance. Pt has  been dressing wound daily with vaseline per MD instructions.   -MW    Patient/Caregiver Goals  Heal wound;Know what to do to help the symptoms  -MW    Patient's Rating of General Health  Good  -MW    Patient seeing anyone else for problem(s)?  [facility]   -MW    How has patient tried to help current problem?  Daily dressing changes with vaseline and non stick bandage   -MW          Pain Location  Leg  -MW    Pain at Present  0  -MW          Any falls in the past year:  No  -MW          Primary Language  English  -MW    Are you able to read  Yes  -MW    Are you able to write  Yes  -MW    How does patient learn best?  Demonstration;Reading  -MW    Teaching needs identified  Management of Condition  -MW    Patient is concerned about/has problems with  Difficulty with self care (i.e. bathing, dressing, toileting:  -MW    Barriers to learning  None  -MW    Pt Participated in POC and Goals  Yes  -MW          Are you being hurt, hit, or frightened by anyone at home or in your life?  No  -MW    Are you being neglected by a caregiver  No  -MW      User Key  (r) = Recorded By, (t) = Taken By, (c) = Cosigned By    Initials Name Provider Type    Lakesha Fonseca, PT Physical Therapist          PT Ortho     Row Name 06/08/20 1015       Subjective Pain    Able to rate subjective pain?  yes  -MW    Pre-Treatment Pain Level  0  -MW    Post-Treatment Pain Level  0  -MW       General ROM    GENERAL ROM COMMENTS  Lt knee and ankle WFL   -MW       Transfers    Comment (Transfers)  seated for tx   -MW       Gait/Stairs Assessment/Training    [place] Level (Gait)  independent  -MW      User Key  (r) = Recorded By, (t) = Taken By, (c) = Cosigned By    Initials Name Provider Type    Lakesha Fonseca, PT Physical Therapist        LDA Wound     Row Name 06/08/20 1015             Wound 06/08/20 1015 Left anterior;lower leg Soft Tissue Necrosis    Wound - Properties Group Date first assessed: 06/08/20  -MW Time first  "assessed: 1015  -MW Present on Hospital Admission: Y  -MW Side: Left  -MW Orientation: anterior;lower  -MW Location: leg  -MW Primary Wound Type: Soft tissue  -MW Additional Comments: non healing surgical wound; s/p skin graft   -MW    Wound Image  Images linked: 2  -MW      Dressing Appearance  intact;moist drainage  -MW      Base  necrotic;yellow;slough;moist;pink;red;granulating fibrous yellow non viable tissue in base post debridement  -MW      Red (%), Wound Tissue Color  70  -MW      Yellow (%), Wound Tissue Color  30  -MW      Periwound  intact;redness;swelling;warm;blanchable  -MW      Periwound Temperature  warm  -MW      Periwound Skin Turgor  soft  -MW      Edges  irregular;open  -MW      Wound Length (cm)  4 cm  -MW      Wound Width (cm)  2.9 cm  -MW      Wound Depth (cm)  0.4 cm true depth abscured by nonviable tissue   -MW      Tunneling [Depth (cm)/Location]  0  -MW      Undermining [Depth (cm)/Location]  0  -MW      Drainage Characteristics/Odor  serous;yellow  -MW      Drainage Amount  moderate;large  -MW      Care, Wound  cleansed with;wound cleanser;debrided;honey applied  -MW      Dressing Care, Wound  dressing changed;antimicrobial agent applied;low-adherent;foam;border dressing Oli Keller, 6\" optifoam   -MW      Periwound Care, Wound  cleansed with pH balanced cleanser;dry periwound area maintained  -MW        User Key  (r) = Recorded By, (t) = Taken By, (c) = Cosigned By    Initials Name Provider Type    Lakesha Fonseca, PT Physical Therapist            WOUND DEBRIDEMENT  Total area of Debridement: ~11 cm2  Debridement Site 1  Location- Site 1: LLE  Selective Debridement- Site 1: Wound Surface <20cmsq  Instruments- Site 1: tweezers  Excised Tissue Description- Site 1: maximum, slough  Bleeding- Site 1: seeping, scant             Therapy Education     Row Name 06/08/20 1015             Therapy Education    Education Details  Keep dressing dry; change prn saturation of optifoam. Skin " "spray, therahoney, HFBt, 6\" optifoam. Supplies provided. Reviewed benefit of Therahoney to loosen remaining nonviable tissue, moist healing environment.   -MW      Given  Bandaging/dressing change;Symptoms/condition management  -MW      Program  Modified  -MW      How Provided  Verbal;Demonstration  -MW      Provided to  Patient  -MW      Level of Understanding  Teach back education performed;Verbalized  -MW      66373 - PT Self Care/Mgmt Minutes  5  -MW        User Key  (r) = Recorded By, (t) = Taken By, (c) = Cosigned By    Initials Name Provider Type    MW Lakesha Wesley, PT Physical Therapist          Recommendation and Plan  PT Assessment/Plan     Row Name 06/08/20 1015          PT Assessment    Functional Limitations  Performance in self-care ADL;Other (comment) wound care   -MW     Impairments  Integumentary integrity  -MW     Assessment Comments  Pt presents with left lower leg wound with evolving symptoms; wound is the site of a skin cancer excision with immediate skin grafting performed on April 20, 2020. The wound had a \"fat bubble come up\" several weeks post op. The wound has continued to drain and be covered with yellow tissues. Pt requested PT wound care to assist with wound healing. PT debrided max yellow slough/ nonviable tissue from wound bed to reveal multiple granulation buds as well as a deep layer of fibrous yellow slough. Recommend continued PT for wound debridement and dressing management. PT switched from vaseling and dry gauze to Therahoney, HFB and optifoam to promote autolytic debridement of residual nonviable tissue, moist, bacteriostatic healing environment.   -MW     Rehab Potential  Good  -MW     Patient/caregiver participated in establishment of treatment plan and goals  Yes  -MW     Patient would benefit from skilled therapy intervention  Yes  -MW        PT Plan    PT Frequency  2x/week  -MW     Predicted Duration of Therapy Intervention (Therapy Eval)  24 visits; 3 months   -MW  "    Planned CPT's?  PT EVAL MOD COMPLELITY: 18634;PT EVERARDO DEBRIDE OPEN WOUND UP TO 20 CM: 59822;PT NONSELECT DEBRIDE 15 MIN: 80868;PT NLFU MIST: 26806;PT SELF CARE/MGMT/TRAIN 15 MIN: 65338;PT MULTI LAYER COMP SYS LE;PT UNNA BOOT: 05464  -MW     Physical Therapy Interventions (Optional Details)  wound care;patient/family education  -MW     PT Plan Comments  Debridement, dressing management; MIST if healing delayed   -MW       User Key  (r) = Recorded By, (t) = Taken By, (c) = Cosigned By    Initials Name Provider Type    Lakesha Fonseca, PT Physical Therapist            Goals  PT OP Goals     Row Name 20 1015          PT Short Term Goals    STG Date to Achieve  20  -MW     STG 1  Pt independent with clean home dressing changes   -MW     STG 2  Pt able to verbalize s/s of infection and when to seek additional care (ED or PCP).   -MW     STG 3  Decrease nonviable tissue to less than 10% of wound area to promote clean wound bed for healing.   -MW     STG 4  Decrease wound dimension at least 25% to demonstrate healing.   -MW     STG 5  No s/s of infection.   -MW        Long Term Goals    LTG 1  Decrease nonviable tissue to less than 5% of wound area to promote clean wound bed for healing.   -MW     LTG 2  Decrease wound dimension at least 75% to demonstrate healing.   -MW        Time Calculation    PT Goal Re-Cert Due Date  20  -MW       User Key  (r) = Recorded By, (t) = Taken By, (c) = Cosigned By    Initials Name Provider Type    Lakesha Fonseca, PT Physical Therapist          Time Calculation: Start Time: 1015  Total Timed Code Minutes- PT: 5 minute(s)  Therapy Charges for Today     Code Description Service Date Service Provider Modifiers Qty    70798308026 HC PT EVAL MOD COMPLEXITY 4 2020 Lakesha Wesley, PT GP 1    41892105898 HC EVERARDO DEBRIDE OPEN WOUND UP TO 20CM 2020 Lakesha Wesley, PT GP 1                Lakesha Wesley PT  2020

## 2020-06-11 ENCOUNTER — HOSPITAL ENCOUNTER (OUTPATIENT)
Dept: PHYSICAL THERAPY | Facility: HOSPITAL | Age: 80
Setting detail: THERAPIES SERIES
Discharge: HOME | End: 2020-06-11

## 2020-06-11 DIAGNOSIS — S81.802D OPEN WOUND OF LEFT LOWER EXTREMITY, SUBSEQUENT ENCOUNTER: Primary | ICD-10-CM

## 2020-06-11 PROCEDURE — 97597 DBRDMT OPN WND 1ST 20 CM/<: CPT | Performed by: PHYSICAL THERAPIST

## 2020-06-11 NOTE — THERAPY WOUND CARE TREATMENT
Outpatient Rehabilitation - Wound/Debridement Treatment Note   [facility]     Patient Name: Joanne Tineo  : 1940  MRN: [id number]  Today's Date: 2020                 Admit Date: 2020    Visit Dx:    ICD-10-CM ICD-9-CM   1. Open wound of left lower extremity, subsequent encounter S81.802D V58.89     891.0       Patient Active Problem List   Diagnosis   • Depression   • Hearing impairment   • Hyperlipidemia   • Irritable bowel syndrome   • Left anterior fascicular block   • Macular degeneration   • Dyssomnia   • Vitamin B deficiency   • Hyperglycemia   • Family history of heart disease   • Vitamin D deficiency        Past Medical History:   Diagnosis Date   • Chronic anxiety Adulthood    Intermittent clonazepam   • Chronic lower back pain    • Depression 1984 - acute flare with 's death   • Fracture of pelvis (CMS/[facility])     Routine healing   • Hearing impairment 2014    Corrects with bilateral hearing aids   • Hyperlipidemia     Total greater than 300   • Irritable bowel syndrome    • Macular degeneration    • Pneumonia 2016    Treated outpatient   • Post menopausal syndrome    • Prediabetes     Hemoglobin A1c 6.0%.   • Sleep disorder    • Vitamin D deficiency         Past Surgical History:   Procedure Laterality Date   • COLONOSCOPY  ,    • DIAGNOSTIC LAPAROSCOPY  1965    for infertility   • TONSILLECTOMY  1943              PT Ortho     Row Name 20 1030       Subjective Comments    Subjective Comments  changed dressing yesterday; was burning some last night; better this morning   -MW       Subjective Pain    Able to rate subjective pain?  yes  -MW    Pre-Treatment Pain Level  1  -MW    Post-Treatment Pain Level  1  -MW       Transfers    Comment (Transfers)  seated for tx   -MW       Gait/Stairs Assessment/Training    [place] Level (Gait)  independent  -MW      User Key  (r) = Recorded By, (t) = Taken By, (c) = Cosigned By    Initials Name  "Provider Type    Lakesha Fonseca, PT Physical Therapist          LDA Wound     Row Name 06/11/20 1030             Wound 06/08/20 1015 Left anterior;lower leg Soft Tissue Necrosis    Wound - Properties Group Date first assessed: 06/08/20  -MW Time first assessed: 1015  -MW Present on Hospital Admission: Y  -MW Side: Left  -MW Orientation: anterior;lower  -MW Location: leg  -MW Primary Wound Type: Soft tissue  -MW Additional Comments: non healing surgical wound; s/p skin graft   -MW    Wound Image  Images linked: 1  -MW      Dressing Appearance  intact;moist drainage  -MW      Base  yellow;slough;moist;pink;red;granulating;epithelialization fibrous yellow non viable tissue in base post debridement  -MW      Periwound  intact;redness;swelling;warm;blanchable;pale white;macerated  -MW      Periwound Temperature  warm  -MW      Periwound Skin Turgor  soft  -MW      Edges  irregular;open  -MW      Wound Length (cm)  4 cm  -MW      Wound Width (cm)  2.8 cm  -MW      Wound Depth (cm)  0.3 cm  -MW      Drainage Characteristics/Odor  serous;yellow  -MW      Drainage Amount  moderate;large  -MW      Care, Wound  cleansed with;wound cleanser;debrided;honey applied  -MW      Dressing Care, Wound  dressing applied;antimicrobial agent applied;non-adherent;foam;border dressing Oil Keller, 6\" optifoam   -MW      Periwound Care, Wound  cleansed with pH balanced cleanser;dry periwound area maintained;barrier ointment applied z guard to protect from maceration   -MW        User Key  (r) = Recorded By, (t) = Taken By, (c) = Cosigned By    Initials Name Provider Type    Lakesha Fonseca, PT Physical Therapist            WOUND DEBRIDEMENT  Total area of Debridement: ~6 cm2  Debridement Site 1  Location- Site 1: LLE  Selective Debridement- Site 1: Wound Surface <20cmsq  Instruments- Site 1: tweezers  Excised Tissue Description- Site 1: moderate, slough  Bleeding- Site 1: none             Therapy Education     Row Name " 06/11/20 1030             Therapy Education    Education Details  Change tomorrow; if less redness change every other day. Add z guard to periwound skin   -MW      Given  Bandaging/dressing change;Symptoms/condition management  -MW      Program  Modified  -MW      How Provided  Verbal;Demonstration  -MW      Provided to  Patient  -MW      Level of Understanding  Teach back education performed;Verbalized  -MW        User Key  (r) = Recorded By, (t) = Taken By, (c) = Cosigned By    Initials Name Provider Type    Lakesha Fonseca, PT Physical Therapist          Recommendation and Plan  PT Assessment/Plan     Row Name 06/11/20 1030          PT Assessment    Functional Limitations  Performance in self-care ADL;Other (comment) wound care   -MW     Impairments  Integumentary integrity  -MW     Assessment Comments  Pt returns with increased granulation tissue and new epithelial growth along wound edges and islands of granulation. Some increased periwound maceration; added barrier to protect and instructed to use less Therahoney. Monitor periwound erythema; consider culturing wound if erythema persists. Pt cont to benefit from PT for selective debridement, and advanced dressing management.  -MW     Rehab Potential  Good  -MW     Patient/caregiver participated in establishment of treatment plan and goals  Yes  -MW     Patient would benefit from skilled therapy intervention  Yes  -MW        PT Plan    PT Frequency  2x/week  -MW     Physical Therapy Interventions (Optional Details)  wound care;patient/family education  -MW     PT Plan Comments  Debridement, dressing management; culture if periwound erythema persists   -MW       User Key  (r) = Recorded By, (t) = Taken By, (c) = Cosigned By    Initials Name Provider Type    Lakesha Fonseca, PT Physical Therapist          Goals                   Time Calculation: Start Time: 1030  Therapy Charges for Today     Code Description Service Date Service Provider Modifiers Qty     [id number] [facility] DEBRIDE OPEN WOUND UP TO 20CM 6/11/2020 Lakesha Wesley, PT GP 1                  Lakesha Wesley, PT  6/11/2020

## 2020-06-16 ENCOUNTER — HOSPITAL ENCOUNTER (OUTPATIENT)
Dept: PHYSICAL THERAPY | Facility: HOSPITAL | Age: 80
Setting detail: THERAPIES SERIES
Discharge: HOME | End: 2020-06-16

## 2020-06-16 DIAGNOSIS — S81.802D OPEN WOUND OF LEFT LOWER EXTREMITY, SUBSEQUENT ENCOUNTER: Primary | ICD-10-CM

## 2020-06-16 PROCEDURE — 97597 DBRDMT OPN WND 1ST 20 CM/<: CPT

## 2020-06-16 NOTE — THERAPY WOUND CARE TREATMENT
Outpatient Rehabilitation - Wound/Debridement Treatment Note   [facility]     Patient Name: Joanne Tineo  : 1940  MRN: [id number]  Today's Date: 2020                 Admit Date: 2020    Visit Dx:    ICD-10-CM ICD-9-CM   1. Open wound of left lower extremity, subsequent encounter S81.802D V58.89     891.0       Patient Active Problem List   Diagnosis   • Depression   • Hearing impairment   • Hyperlipidemia   • Irritable bowel syndrome   • Left anterior fascicular block   • Macular degeneration   • Dyssomnia   • Vitamin B deficiency   • Hyperglycemia   • Family history of heart disease   • Vitamin D deficiency        Past Medical History:   Diagnosis Date   • Chronic anxiety Adulthood    Intermittent clonazepam   • Chronic lower back pain    • Depression 1984 - acute flare with 's death   • Fracture of pelvis (CMS/[facility])     Routine healing   • Hearing impairment 2014    Corrects with bilateral hearing aids   • Hyperlipidemia     Total greater than 300   • Irritable bowel syndrome    • Macular degeneration    • Pneumonia 2016    Treated outpatient   • Post menopausal syndrome    • Prediabetes     Hemoglobin A1c 6.0%.   • Sleep disorder    • Vitamin D deficiency         Past Surgical History:   Procedure Laterality Date   • COLONOSCOPY  2016   • DIAGNOSTIC LAPAROSCOPY  1965    for infertility   • TONSILLECTOMY  1943         EVALUATION  PT Ortho     Row Name 20 1030       Subjective Comments    Subjective Comments  Reports changing dressing yesterday.  -       Subjective Pain    Able to rate subjective pain?  yes  -JM    Pre-Treatment Pain Level  0  -JM    Post-Treatment Pain Level  0  -JM    Subjective Pain Comment  only occasional sharp pain  -JM       Transfers    Comment (Transfers)  seated for tx  -       Gait/Stairs Assessment/Training    [place] Level (Gait)  independent  -      User Key  (r) = Recorded By, (t) = Taken By, (c) =  "Cosigned By    Initials Name Provider Type    Laquita Ortega, PT Physical Therapist          LDA Wound     Row Name 06/16/20 1030             Wound 06/08/20 1015 Left anterior;lower leg Soft Tissue Necrosis    Wound - Properties Group Date first assessed: 06/08/20  -MW Time first assessed: 1015  -MW Present on Hospital Admission: Y  -MW Side: Left  -MW Orientation: anterior;lower  -MW Location: leg  -MW Primary Wound Type: Soft tissue  -MW Additional Comments: non healing surgical wound; s/p skin graft   -    Wound Image  Images linked: 2  -JM      Dressing Appearance  intact;moist drainage  -      Base  yellow;slough;moist;pink;red;granulating;epithelialization  -      Periwound  intact;redness;swelling;warm;blanchable;pale white;macerated  -      Periwound Temperature  warm  -      Periwound Skin Turgor  soft  -      Edges  irregular;open  -      Drainage Characteristics/Odor  serous;yellow  -      Drainage Amount  moderate  -      Care, Wound  cleansed with;wound cleanser;debrided;honey applied  -      Dressing Care, Wound  dressing applied;antimicrobial agent applied;foam sorbact, HFBt, 6\" optifoam, size 3.5 compressogrip  -      Periwound Care, Wound  cleansed with pH balanced cleanser;dry periwound area maintained;barrier ointment applied z-guard  -        User Key  (r) = Recorded By, (t) = Taken By, (c) = Cosigned By    Initials Name Provider Type    Lakesha Fonseca, PT Physical Therapist    Laquita Ortega, PT Physical Therapist            WOUND DEBRIDEMENT  Total area of Debridement: 6cmsq  Debridement Site 1  Location- Site 1: LLE  Selective Debridement- Site 1: Wound Surface <20cmsq  Instruments- Site 1: tweezers, scissors  Excised Tissue Description- Site 1: moderate, slough, other (comment)(biofilm)  Bleeding- Site 1: scant             Therapy Education     Row Name 06/16/20 1030             Therapy Education    Education Details  Addition of sorbact to wound " under HFB foam, addition of compressogrip to reduce swelling and drainage, recommended leg elevation to reduce drainage and assist with wound healing, continue to change dressing PRN for drainage between txs.  -      Given  Bandaging/dressing change;Symptoms/condition management  -      Program  Modified  -      How Provided  Verbal;Demonstration  -      Provided to  Patient  -      Level of Understanding  Teach back education performed;Verbalized  -        User Key  (r) = Recorded By, (t) = Taken By, (c) = Cosigned By    Initials Name Provider Type    Laquita Ortega, PT Physical Therapist          Recommendation and Plan  PT Assessment/Plan     Row Name 06/16/20 1030          PT Assessment    Functional Limitations  Performance in self-care ADL;Other (comment) wound care   -     Impairments  Integumentary integrity  -     Assessment Comments  LLE wound with granulation bud formation in base, still with moderate biofilm layer that PT debrided, residual yellow tissue in base anterior/medial aspect.  PT added sorbact as contact layer to reduce biofilm buildup, also added compressogrip for light compression to reduce edema and drainage to assist with wound healing.  -        PT Plan    PT Frequency  2x/week  -     Physical Therapy Interventions (Optional Details)  patient/family education;wound care  -     PT Plan Comments  debridement, dressings, add full MLW if indicated  -       User Key  (r) = Recorded By, (t) = Taken By, (c) = Cosigned By    Initials Name Provider Type    Laquita Ortega, PT Physical Therapist          Goals  PT OP Goals     Row Name 06/16/20 1030          Time Calculation    PT Goal Re-Cert Due Date  09/08/20  -       User Key  (r) = Recorded By, (t) = Taken By, (c) = Cosigned By    Initials Name Provider Type    Laquita Ortega, PT Physical Therapist          PT Goal Re-Cert Due Date: 09/08/20            Time Calculation: Start Time: 1030  Therapy  Charges for Today     Code Description Service Date Service Provider Modifiers Qty    [id number]  EVERARDO DEBRIDE OPEN WOUND UP TO 20CM 6/16/2020 Laquita Matamoros, PT GP 1                  Laquita Matamoros, PT  6/16/2020

## 2020-06-18 ENCOUNTER — HOSPITAL ENCOUNTER (OUTPATIENT)
Dept: PHYSICAL THERAPY | Facility: HOSPITAL | Age: 80
Setting detail: THERAPIES SERIES
Discharge: HOME | End: 2020-06-18

## 2020-06-18 DIAGNOSIS — S81.802D OPEN WOUND OF LEFT LOWER EXTREMITY, SUBSEQUENT ENCOUNTER: Primary | ICD-10-CM

## 2020-06-18 PROCEDURE — 97597 DBRDMT OPN WND 1ST 20 CM/<: CPT | Performed by: PHYSICAL THERAPIST

## 2020-06-18 NOTE — THERAPY WOUND CARE TREATMENT
Outpatient Rehabilitation - Wound/Debridement Treatment Note   [facility]     Patient Name: Joanne Tineo  : 1940  MRN: [id number]  Today's Date: 2020                 Admit Date: 2020    Visit Dx:    ICD-10-CM ICD-9-CM   1. Open wound of left lower extremity, subsequent encounter S81.802D V58.89     891.0       Patient Active Problem List   Diagnosis   • Depression   • Hearing impairment   • Hyperlipidemia   • Irritable bowel syndrome   • Left anterior fascicular block   • Macular degeneration   • Dyssomnia   • Vitamin B deficiency   • Hyperglycemia   • Family history of heart disease   • Vitamin D deficiency        Past Medical History:   Diagnosis Date   • Chronic anxiety Adulthood    Intermittent clonazepam   • Chronic lower back pain    • Depression 1984 - acute flare with 's death   • Fracture of pelvis (CMS/[facility])     Routine healing   • Hearing impairment 2014    Corrects with bilateral hearing aids   • Hyperlipidemia     Total greater than 300   • Irritable bowel syndrome    • Macular degeneration    • Pneumonia 2016    Treated outpatient   • Post menopausal syndrome    • Prediabetes     Hemoglobin A1c 6.0%.   • Sleep disorder    • Vitamin D deficiency         Past Surgical History:   Procedure Laterality Date   • COLONOSCOPY  2016   • DIAGNOSTIC LAPAROSCOPY  1965    for infertility   • TONSILLECTOMY  1943         PT Ortho     Row Name 20 1030       Subjective Comments    Subjective Comments  Reports leaving dressing in place and compression. Has been elevating leg; limiting her activity.   -MW       Subjective Pain    Able to rate subjective pain?  yes  -MW    Pre-Treatment Pain Level  0  -MW    Post-Treatment Pain Level  0  -MW       Transfers    Comment (Transfers)  seated for tx  -MW       Gait/Stairs Assessment/Training    [place] Level (Gait)  independent  -MW      User Key  (r) = Recorded By, (t) = Taken By, (c) = Cosigned By     "Initials Name Provider Type    MW Lakesha Wesley, PT Physical Therapist          LDA Wound     Row Name 06/18/20 1030             Wound 06/08/20 1015 Left anterior;lower leg Soft Tissue Necrosis    Wound - Properties Group Date first assessed: 06/08/20  -MW Time first assessed: 1015  -MW Present on Hospital Admission: Y  -MW Side: Left  -MW Orientation: anterior;lower  -MW Location: leg  -MW Primary Wound Type: Soft tissue  -MW Additional Comments: non healing surgical wound; s/p skin graft   -MW    Dressing Appearance  intact;moist drainage  -MW      Base  yellow;slough;moist;pink;red;granulating;epithelialization  -MW      Periwound  intact;redness;swelling;warm;blanchable;pale white;macerated minimal maceration   -MW      Periwound Temperature  warm  -MW      Periwound Skin Turgor  soft  -MW      Edges  irregular;open  -MW      Drainage Characteristics/Odor  serous;yellow  -MW      Drainage Amount  moderate;small  -MW      Care, Wound  cleansed with;wound cleanser;debrided;honey applied  -MW      Dressing Care, Wound  dressing applied;antimicrobial agent applied;foam Therahoney, sorbact, HFBt, 6\" optifoam; 3.5 compressogrip   -MW      Periwound Care, Wound  cleansed with pH balanced cleanser;dry periwound area maintained;barrier ointment applied thin layer z guard   -MW        User Key  (r) = Recorded By, (t) = Taken By, (c) = Cosigned By    Initials Name Provider Type    Lakesha Fonseca, PT Physical Therapist            WOUND DEBRIDEMENT  Total area of Debridement: ~6 cm2  Debridement Site 1  Location- Site 1: LLE  Selective Debridement- Site 1: Wound Surface <20cmsq  Instruments- Site 1: tweezers  Excised Tissue Description- Site 1: moderate, slough, other (comment)(biofilm )  Bleeding- Site 1: scant, seeping                 Recommendation and Plan  PT Assessment/Plan     Row Name 06/18/20 1030          PT Assessment    Functional Limitations  Performance in self-care ADL;Other (comment) wound care   " -MW     Impairments  Integumentary integrity  -     Assessment Comments  LLE wound continues to improve with new epithelial growth and granulation buds; still with persistent slough and biofilm build up which required debridement. Continued Therahoney and cutimed sorbact. Consider MIST if yellow slough/ nonviable tissues delay closure.   -MW     Rehab Potential  Good  -MW     Patient/caregiver participated in establishment of treatment plan and goals  Yes  -MW     Patient would benefit from skilled therapy intervention  Yes  -MW        PT Plan    PT Frequency  2x/week  -MW     Physical Therapy Interventions (Optional Details)  wound care;patient/family education  -     PT Plan Comments  debridement, dressing management and compressogrip   -       User Key  (r) = Recorded By, (t) = Taken By, (c) = Cosigned By    Initials Name Provider Type    Lakesha Fonseca, PT Physical Therapist          Goals                   Time Calculation: Start Time: 1030  Therapy Charges for Today     Code Description Service Date Service Provider Modifiers Qty    [id number]  EVERARDO DEBRIDE OPEN WOUND UP TO 20CM 6/18/2020 Lakesha Wesley, PT GP 1                  Lakesha Wesley, PT  6/18/2020

## 2020-06-22 ENCOUNTER — HOSPITAL ENCOUNTER (OUTPATIENT)
Dept: PHYSICAL THERAPY | Facility: HOSPITAL | Age: 80
Setting detail: THERAPIES SERIES
Discharge: HOME | End: 2020-06-22

## 2020-06-22 DIAGNOSIS — S81.802D OPEN WOUND OF LEFT LOWER EXTREMITY, SUBSEQUENT ENCOUNTER: Primary | ICD-10-CM

## 2020-06-22 PROCEDURE — 97610 LOW FREQUENCY NON-THERMAL US: CPT | Performed by: PHYSICAL THERAPIST

## 2020-06-22 PROCEDURE — 97597 DBRDMT OPN WND 1ST 20 CM/<: CPT | Performed by: PHYSICAL THERAPIST

## 2020-06-22 NOTE — THERAPY WOUND CARE TREATMENT
Outpatient Rehabilitation - Wound/Debridement Treatment Note   [facility]     Patient Name: Joanne Tineo  : 1940  MRN: [id number]  Today's Date: 2020                 Admit Date: 2020    Visit Dx:    ICD-10-CM ICD-9-CM   1. Open wound of left lower extremity, subsequent encounter S81.802D V58.89     891.0       Patient Active Problem List   Diagnosis   • Depression   • Hearing impairment   • Hyperlipidemia   • Irritable bowel syndrome   • Left anterior fascicular block   • Macular degeneration   • Dyssomnia   • Vitamin B deficiency   • Hyperglycemia   • Family history of heart disease   • Vitamin D deficiency        Past Medical History:   Diagnosis Date   • Chronic anxiety Adulthood    Intermittent clonazepam   • Chronic lower back pain    • Depression 1984 - acute flare with 's death   • Fracture of pelvis (CMS/[facility])     Routine healing   • Hearing impairment 2014    Corrects with bilateral hearing aids   • Hyperlipidemia     Total greater than 300   • Irritable bowel syndrome    • Macular degeneration    • Pneumonia     Treated outpatient   • Post menopausal syndrome    • Prediabetes     Hemoglobin A1c 6.0%.   • Sleep disorder    • Vitamin D deficiency         Past Surgical History:   Procedure Laterality Date   • COLONOSCOPY  2016   • DIAGNOSTIC LAPAROSCOPY  1965    for infertility   • TONSILLECTOMY  1943         PT Ortho     Row Name 20 1015       Subjective Comments    Subjective Comments  Pt reports no issues with leg wound. Did a little garden this weekend, no issues.   -MW       Subjective Pain    Able to rate subjective pain?  yes  -MW    Pre-Treatment Pain Level  0  -MW    Post-Treatment Pain Level  0  -MW       Transfers    Comment (Transfers)  seated for tx  -MW       Gait/Stairs Assessment/Training    [place] Level (Gait)  independent  -MW      User Key  (r) = Recorded By, (t) = Taken By, (c) = Cosigned By    Initials Name  "Provider Type    Lakesha Fonseca, PT Physical Therapist          LDA Wound     Row Name 06/22/20 1015             Wound 06/08/20 1015 Left anterior;lower leg Soft Tissue Necrosis    Wound - Properties Group Date first assessed: 06/08/20  -MW Time first assessed: 1015  -MW Present on Hospital Admission: Y  -MW Side: Left  -MW Orientation: anterior;lower  -MW Location: leg  -MW Primary Wound Type: Soft tissue  -MW Additional Comments: non healing surgical wound; s/p skin graft   -MW    Wound Image  Images linked: 1  -MW      Dressing Appearance  intact;moist drainage  -MW      Base  yellow;slough;moist;pink;red;granulating;epithelialization  -MW      Periwound  intact;warm;blanchable;pink minimal maceration   -MW      Periwound Temperature  warm  -MW      Periwound Skin Turgor  soft  -MW      Edges  irregular;open  -MW      Wound Length (cm)  3.5 cm  -MW      Wound Width (cm)  2 cm  -MW      Wound Depth (cm)  0.2 cm  -MW      Drainage Characteristics/Odor  serous;yellow  -MW      Drainage Amount  small  -MW      Care, Wound  irrigated with;sterile normal saline;ultrasound therapy, non contact low frequency;cleansed with;wound cleanser;debrided MIST x 7 min   -MW      Dressing Care, Wound  dressing applied;antimicrobial agent applied;foam;border dressing Therahoney, cutimed sorbact, HFBt, 6\" optifoam   -MW      Periwound Care, Wound  cleansed with pH balanced cleanser;dry periwound area maintained  -MW        User Key  (r) = Recorded By, (t) = Taken By, (c) = Cosigned By    Initials Name Provider Type    Lakesha Fonseca, PT Physical Therapist            WOUND DEBRIDEMENT  Total area of Debridement: ~5 cm2  Debridement Site 1  Location- Site 1: LLE  Selective Debridement- Site 1: Wound Surface <20cmsq  Instruments- Site 1: tweezers  Excised Tissue Description- Site 1: moderate, slough(slough and biofilm )  Bleeding- Site 1: seeping, scant, held pressure, 1 minute                 Recommendation and Plan  PT " Assessment/Plan     Row Name 06/22/20 1015          PT Assessment    Functional Limitations  Performance in self-care ADL;Other (comment) wound care   -     Impairments  Integumentary integrity  -     Assessment Comments  LLE wound with persistent fibrous yellow slough adjacent to tibial fascia. Added MIST to promote increased local blood flow to promote healing as well as loosen nonviable tissue and decrease bioburden. Continued selective debridement of nonviable tissue and Therahoney to cont to promote autolytic softening of nonviable tissue. Periwound maceration decreased with decrease in wound drainage and good capture of exudate with foam dressings.   -     Rehab Potential  Good  -MW     Patient/caregiver participated in establishment of treatment plan and goals  Yes  -MW     Patient would benefit from skilled therapy intervention  Yes  -MW        PT Plan    PT Frequency  2x/week  -     Physical Therapy Interventions (Optional Details)  wound care;patient/family education  -     PT Plan Comments  MIST, debridement, dressing management  -       User Key  (r) = Recorded By, (t) = Taken By, (c) = Cosigned By    Initials Name Provider Type     Lakesha Wesley, PT Physical Therapist                       Time Calculation: Start Time: 1015  Therapy Charges for Today     Code Description Service Date Service Provider Modifiers Qty    68969024546 HC EVERARDO DEBRIDE OPEN WOUND UP TO 20CM 6/22/2020 Lakesha Wesley, PT GP 1    [id number]  PT NLFU MIST 6/22/2020 Lakesha Wesley, PT GP 1                  Lakesha Wesley, SNOW  6/22/2020

## 2020-06-25 ENCOUNTER — HOSPITAL ENCOUNTER (OUTPATIENT)
Dept: PHYSICAL THERAPY | Facility: HOSPITAL | Age: 80
Setting detail: THERAPIES SERIES
Discharge: HOME | End: 2020-06-25

## 2020-06-25 DIAGNOSIS — S81.802D OPEN WOUND OF LEFT LOWER EXTREMITY, SUBSEQUENT ENCOUNTER: Primary | ICD-10-CM

## 2020-06-25 PROCEDURE — 97597 DBRDMT OPN WND 1ST 20 CM/<: CPT

## 2020-06-25 PROCEDURE — 97610 LOW FREQUENCY NON-THERMAL US: CPT

## 2020-06-25 NOTE — THERAPY WOUND CARE TREATMENT
Outpatient Rehabilitation - Wound/Debridement Treatment Note   [facility]     Patient Name: Joanne Tineo  : 1940  MRN: [id number]  Today's Date: 2020                 Admit Date: 2020    Visit Dx:    ICD-10-CM ICD-9-CM   1. Open wound of left lower extremity, subsequent encounter S81.802D V58.89     891.0       Patient Active Problem List   Diagnosis   • Depression   • Hearing impairment   • Hyperlipidemia   • Irritable bowel syndrome   • Left anterior fascicular block   • Macular degeneration   • Dyssomnia   • Vitamin B deficiency   • Hyperglycemia   • Family history of heart disease   • Vitamin D deficiency        Past Medical History:   Diagnosis Date   • Chronic anxiety Adulthood    Intermittent clonazepam   • Chronic lower back pain    • Depression 1984 - acute flare with 's death   • Fracture of pelvis (CMS/[facility])     Routine healing   • Hearing impairment 2014    Corrects with bilateral hearing aids   • Hyperlipidemia     Total greater than 300   • Irritable bowel syndrome    • Macular degeneration    • Pneumonia     Treated outpatient   • Post menopausal syndrome    • Prediabetes     Hemoglobin A1c 6.0%.   • Sleep disorder    • Vitamin D deficiency         Past Surgical History:   Procedure Laterality Date   • COLONOSCOPY  2016   • DIAGNOSTIC LAPAROSCOPY  1965    for infertility   • TONSILLECTOMY  1943         EVALUATION  PT Ortho     Row Name 20 1015       Subjective Comments    Subjective Comments  Pt noticed more drainage this time, and has had additional pain.   -MC       Subjective Pain    Able to rate subjective pain?  yes  -MC    Pre-Treatment Pain Level  0  -MC    Post-Treatment Pain Level  0  -MC       Transfers    Comment (Transfers)  seated for tx  -       Gait/Stairs Assessment/Training    [place] Level (Gait)  independent  -      User Key  (r) = Recorded By, (t) = Taken By, (c) = Cosigned By    Initials Name  "Provider Type    Pam Ricardo, PT Physical Therapist          LDA Wound     Row Name 06/25/20 1015             Wound 06/08/20 1015 Left anterior;lower leg Soft Tissue Necrosis    Wound - Properties Group Date first assessed: 06/08/20  -MW Time first assessed: 1015  -MW Present on Hospital Admission: Y  -MW Side: Left  -MW Orientation: anterior;lower  -MW Location: leg  - Primary Wound Type: Soft tissue  -MW Additional Comments: non healing surgical wound; s/p skin graft   -    Dressing Appearance  intact;moist drainage  -      Base  yellow;slough;moist;pink;red;granulating;epithelialization  -      Periwound  intact;warm;blanchable;pink;macerated;moist moderate maceration   -      Periwound Temperature  warm  -      Periwound Skin Turgor  soft  -      Edges  irregular;open  -      Drainage Characteristics/Odor  serous;yellow;malodorous mod foul odor  -      Drainage Amount  small  -      Care, Wound  cleansed with;wound cleanser;debrided;ultrasound therapy, non contact low frequency MIST 7 minutes  -      Dressing Care, Wound  dressing applied;gauze, antimicrobial;antimicrobial agent applied;foam;low-adherent;border dressing sorbact, HFBt, 6\" optifoam  -      Periwound Care, Wound  cleansed with pH balanced cleanser;barrier ointment applied zguard  -        User Key  (r) = Recorded By, (t) = Taken By, (c) = Cosigned By    Initials Name Provider Type    Lakesha Fonseca, PT Physical Therapist    Pam Ricardo, PT Physical Therapist            WOUND DEBRIDEMENT  Total area of Debridement: 4 cm2  Debridement Site 1  Location- Site 1: LLE  Selective Debridement- Site 1: Wound Surface <20cmsq  Instruments- Site 1: tweezers  Excised Tissue Description- Site 1: moderate, slough, other (comment)(macerated skin debris)  Bleeding- Site 1: scant, held pressure, 1 minute             Therapy Education     Row Name 06/25/20 1015             Therapy Education    Education Details  " Explained holding honey d/t maceration. Will contact MD and/or surgeon to ask about wound cultures. Issued extra wound dressings if needed.  -      Given  Bandaging/dressing change;Symptoms/condition management  -      Program  Reinforced  -      How Provided  Verbal;Demonstration  -      Provided to  Patient  -      Level of Understanding  Teach back education performed;Verbalized  -        User Key  (r) = Recorded By, (t) = Taken By, (c) = Cosigned By    Initials Name Provider Type    Pam Ricardo PT Physical Therapist          Recommendation and Plan  PT Assessment/Plan     Row Name 06/25/20 1015          PT Assessment    Functional Limitations  Performance in self-care ADL;Other (comment) wound mgmt  -     Impairments  Integumentary integrity  -     Assessment Comments  Pt with increased maceration, increased drainage, moderate odor, and increased c/o pain since last assessment. Gathered swabs for wound cultures, and will contact MD to ask for order to have cultures processed. PT able to debride additional necrotic material today, and PT held therahoney d/t increased moisture/maceration. Pt will continue to benefit from skilled PT wound care to promote healing.  -     Rehab Potential  Good  -     Patient/caregiver participated in establishment of treatment plan and goals  Yes  -     Patient would benefit from skilled therapy intervention  Yes  -        PT Plan    PT Frequency  2x/week  -     Physical Therapy Interventions (Optional Details)  patient/family education;wound care  -     PT Plan Comments  MIST, debridement, dressings management, ?check cultures  -       User Key  (r) = Recorded By, (t) = Taken By, (c) = Cosigned By    Initials Name Provider Type    Pam Ricardo PT Physical Therapist          Goals  PT OP Goals     Row Name 06/25/20 1015          Time Calculation    PT Goal Re-Cert Due Date  09/08/20  -       User Key  (r) = Recorded By, (t) = Taken  By, (c) = Cosigned By    Initials Name Provider Type     Pam Oseguera, PT Physical Therapist          PT Goal Re-Cert Due Date: 09/08/20            Time Calculation: Start Time: 1015  Therapy Charges for Today     Code Description Service Date Service Provider Modifiers Qty    38762597789 HC PT NLFU MIST 6/25/2020 Pam Oseguera, PT GP 1    [id number] HC EVERARDO DEBRIDE OPEN WOUND UP TO 20CM 6/25/2020 Pam Oseguera, PT GP 1                  Pam Oseguera, PT  6/25/2020

## 2020-06-26 ENCOUNTER — TELEPHONE (OUTPATIENT)
Dept: INTERNAL MEDICINE | Facility: CLINIC | Age: 80
End: 2020-06-26

## 2020-06-26 ENCOUNTER — LAB (OUTPATIENT)
Dept: LAB | Facility: HOSPITAL | Age: 80
End: 2020-06-26

## 2020-06-26 ENCOUNTER — OFFICE VISIT (OUTPATIENT)
Dept: INTERNAL MEDICINE | Facility: CLINIC | Age: 80
End: 2020-06-26

## 2020-06-26 ENCOUNTER — LAB REQUISITION (OUTPATIENT)
Dept: LAB | Facility: HOSPITAL | Age: 80
End: 2020-06-26

## 2020-06-26 VITALS
WEIGHT: 155.8 LBS | SYSTOLIC BLOOD PRESSURE: 122 MMHG | HEIGHT: 64 IN | DIASTOLIC BLOOD PRESSURE: 74 MMHG | TEMPERATURE: 97.3 F | BODY MASS INDEX: 26.6 KG/M2

## 2020-06-26 DIAGNOSIS — R73.03 PREDIABETES: ICD-10-CM

## 2020-06-26 DIAGNOSIS — L97.922 NONHEALING ULCER OF LEFT LOWER LEG WITH FAT LAYER EXPOSED (HCC): Primary | ICD-10-CM

## 2020-06-26 DIAGNOSIS — Z13.220 ENCOUNTER FOR SCREENING FOR LIPOID DISORDERS: ICD-10-CM

## 2020-06-26 DIAGNOSIS — F32.A ANXIETY AND DEPRESSION: ICD-10-CM

## 2020-06-26 DIAGNOSIS — L97.922 NON-PRESSURE CHRONIC ULCER OF UNSPECIFIED PART OF LEFT LOWER LEG WITH FAT LAYER EXPOSED (HCC): ICD-10-CM

## 2020-06-26 DIAGNOSIS — F41.9 ANXIETY: ICD-10-CM

## 2020-06-26 DIAGNOSIS — Z13.220 SCREENING CHOLESTEROL LEVEL: ICD-10-CM

## 2020-06-26 DIAGNOSIS — F41.9 ANXIETY AND DEPRESSION: ICD-10-CM

## 2020-06-26 PROCEDURE — 87186 SC STD MICRODIL/AGAR DIL: CPT | Performed by: INTERNAL MEDICINE

## 2020-06-26 PROCEDURE — 87205 SMEAR GRAM STAIN: CPT | Performed by: INTERNAL MEDICINE

## 2020-06-26 PROCEDURE — 87070 CULTURE OTHR SPECIMN AEROBIC: CPT | Performed by: INTERNAL MEDICINE

## 2020-06-26 PROCEDURE — 87075 CULTR BACTERIA EXCEPT BLOOD: CPT | Performed by: INTERNAL MEDICINE

## 2020-06-26 PROCEDURE — 99214 OFFICE O/P EST MOD 30 MIN: CPT | Performed by: INTERNAL MEDICINE

## 2020-06-26 RX ORDER — ESCITALOPRAM OXALATE 20 MG/1
20 TABLET ORAL DAILY
Qty: 90 TABLET | Refills: 2 | Status: SHIPPED | OUTPATIENT
Start: 2020-06-26 | End: 2021-07-12

## 2020-06-26 RX ORDER — CLONAZEPAM 0.5 MG/1
0.5 TABLET ORAL DAILY PRN
Qty: 10 TABLET | Refills: 4 | Status: SHIPPED | OUTPATIENT
Start: 2020-06-26 | End: 2021-03-24 | Stop reason: SDUPTHER

## 2020-06-26 NOTE — PROGRESS NOTES
Subjective   Joanne Tineo is a 80 y.o. female here for follow-up chronic leg wound, anxiety and depression, prediabetes.  Chronic leg wound: Started about December of last year.  It was diagnosed as an ulcer and she eventually had to go to infectious disease and was on doxycycline for 10 days.  She was also sent to wound care and had to go to 3 times per week but it eventually healed.  Then, 2 weeks later a spot appeared that was later diagnosed a skin cancer (squamous? Not melanoma).  And she had to have a subsequent skin graft which failed.  She then ended up back in wound care again and she has been there since the end of March.  Her wound care nurse evaluated in the last few days and thought that the wound had a foul smell, was red and draining more than it had been so she collected a wound culture.  Patient says today that wound has actually drastically improved.  There is no pain in that like there was, redness is less and no drainage.  She denies any constitutional symptoms.  She does have some swelling and color change in the feet and sometimes they feel cold.  She has never been diagnosed with vascular insufficiency.  Anxiety and depression: Anxiety worse recently, patient normally gets 10 Klonopin at a time and they last her a while though she does admit to taking them a bit more frequently lately due to pandemic and her wound issue.  Prediabetes: Due for A1c, no endo-symptoms.    I have reviewed the following portions of the patient's history and confirmed they are accurate: current medications, past medical history, past social history, past surgical history and problem list     I have personally completed the patient's review of systems.    Review of Systems:  General: negative  CV: negative  Respiratory: negative  Neuro: negative  Psych: Anxiety:  Skin: Wound    Objective   /74 (BP Location: Left arm, Patient Position: Sitting, Cuff Size: Large Adult)   Temp 97.3 °F (36.3 °C) (Temporal)   " Ht 162.6 cm (64\")   Wt 70.7 kg (155 lb 12.8 oz)   BMI 26.74 kg/m²     Physical Exam   Constitutional: She is oriented to person, place, and time. She appears well-developed and well-nourished.   Pulmonary/Chest: Effort normal.   Neurological: She is alert and oriented to person, place, and time.   Skin: Skin is warm and dry.        Wound with good granulation tissue, no foul smell or drainage.  Mild amount of redness around the wound.  No obvious signs of infection.   Psychiatric: She has a normal mood and affect. Her behavior is normal. Judgment and thought content normal.   Vitals reviewed.      Assessment/Plan   Joanne was seen today for follow-up.    Diagnoses and all orders for this visit:    Nonhealing ulcer of left lower leg with fat layer exposed (CMS/HCC)  -     Ambulatory Referral to Vascular Surgery  -     CBC (No Diff)  -     Comprehensive Metabolic Panel  -     Hemoglobin A1c  -going on 6 months now, likely underlying vascular insufficiency. Will do some workup as well and refer to vascular    Screening cholesterol level  -     Lipid Panel    Anxiety and depression  -     escitalopram (Lexapro) 20 MG tablet; Take 1 tablet by mouth Daily.  -chronic stable, continue lexapro    Anxiety  -     clonazePAM (KlonoPIN) 0.5 MG tablet; Take 1 tablet by mouth Daily As Needed for anxiety  -worse lately given pandemic and health concerns, continue PRN    Prediabetes   -     Hemoglobin A1c  -Labs for maintenance             Victoria Arndt MA    Please note that portions of this note were completed with a voice recognition program. Efforts were made to edit the dictations, but occasionally words are mistranscribed.  "

## 2020-06-27 LAB
ALBUMIN SERPL-MCNC: 4.2 G/DL (ref 3.7–4.7)
ALBUMIN/GLOB SERPL: 1.8 {RATIO} (ref 1.2–2.2)
ALP SERPL-CCNC: 105 IU/L (ref 39–117)
ALT SERPL-CCNC: 25 IU/L (ref 0–32)
AST SERPL-CCNC: 23 IU/L (ref 0–40)
BILIRUB SERPL-MCNC: 0.3 MG/DL (ref 0–1.2)
BUN SERPL-MCNC: 19 MG/DL (ref 8–27)
BUN/CREAT SERPL: 26 (ref 12–28)
CALCIUM SERPL-MCNC: 9.2 MG/DL (ref 8.7–10.3)
CHLORIDE SERPL-SCNC: 104 MMOL/L (ref 96–106)
CHOLEST SERPL-MCNC: 293 MG/DL (ref 100–199)
CO2 SERPL-SCNC: 26 MMOL/L (ref 20–29)
CREAT SERPL-MCNC: 0.72 MG/DL (ref 0.57–1)
ERYTHROCYTE [DISTWIDTH] IN BLOOD BY AUTOMATED COUNT: 13.1 % (ref 11.7–15.4)
GLOBULIN SER CALC-MCNC: 2.4 G/DL (ref 1.5–4.5)
GLUCOSE SERPL-MCNC: 112 MG/DL (ref 65–99)
HBA1C MFR BLD: 5.7 % (ref 4.8–5.6)
HCT VFR BLD AUTO: 44.1 % (ref 34–46.6)
HDLC SERPL-MCNC: 47 MG/DL
HGB BLD-MCNC: 14.1 G/DL (ref 11.1–15.9)
LDLC SERPL CALC-MCNC: 185 MG/DL (ref 0–99)
MCH RBC QN AUTO: 28.9 PG (ref 26.6–33)
MCHC RBC AUTO-ENTMCNC: 32 G/DL (ref 31.5–35.7)
MCV RBC AUTO: 90 FL (ref 79–97)
PLATELET # BLD AUTO: 238 X10E3/UL (ref 150–450)
POTASSIUM SERPL-SCNC: 4.7 MMOL/L (ref 3.5–5.2)
PROT SERPL-MCNC: 6.6 G/DL (ref 6–8.5)
RBC # BLD AUTO: 4.88 X10E6/UL (ref 3.77–5.28)
SODIUM SERPL-SCNC: 143 MMOL/L (ref 134–144)
TRIGL SERPL-MCNC: 307 MG/DL (ref 0–149)
VLDLC SERPL CALC-MCNC: 61 MG/DL (ref 5–40)
WBC # BLD AUTO: 5 X10E3/UL (ref 3.4–10.8)

## 2020-06-29 LAB
BACTERIA SPEC AEROBE CULT: ABNORMAL
BACTERIA SPEC AEROBE CULT: ABNORMAL
GRAM STN SPEC: ABNORMAL

## 2020-06-29 RX ORDER — CEPHALEXIN 500 MG/1
500 CAPSULE ORAL 2 TIMES DAILY
Qty: 14 CAPSULE | Refills: 0 | Status: SHIPPED | OUTPATIENT
Start: 2020-06-29 | End: 2020-07-06

## 2020-06-30 ENCOUNTER — HOSPITAL ENCOUNTER (OUTPATIENT)
Dept: PHYSICAL THERAPY | Facility: HOSPITAL | Age: 80
Setting detail: THERAPIES SERIES
Discharge: HOME | End: 2020-06-30

## 2020-06-30 DIAGNOSIS — S81.802D OPEN WOUND OF LEFT LOWER EXTREMITY, SUBSEQUENT ENCOUNTER: Primary | ICD-10-CM

## 2020-06-30 PROCEDURE — 97597 DBRDMT OPN WND 1ST 20 CM/<: CPT

## 2020-06-30 PROCEDURE — 97610 LOW FREQUENCY NON-THERMAL US: CPT

## 2020-07-01 LAB — BACTERIA SPEC ANAEROBE CULT: NORMAL

## 2020-07-02 ENCOUNTER — TELEPHONE (OUTPATIENT)
Dept: INTERNAL MEDICINE | Facility: CLINIC | Age: 80
End: 2020-07-02

## 2020-07-02 NOTE — TELEPHONE ENCOUNTER
----- Message from Lynda Landrum MD sent at 6/30/2020 10:27 AM EDT -----  Please call the patient regarding her abnormal result. Tell her blood work overall looks good except cholesterol went through the roof. Is she taking her atorvastatin? Can she come back for fasting cholesterol if she wasn't fasting that day? It has never been that high.

## 2020-07-02 NOTE — TELEPHONE ENCOUNTER
----- Message from Lynda Landrum MD sent at 6/29/2020 12:49 PM EDT -----  Please call the patient regarding her abnormal result.  Tell her the wound culture grew enterobacter, which does need to be treated with antibiotics even though the wound looked better when I saw it.  I have sent in Keflex, should get rid of it.

## 2020-07-03 ENCOUNTER — HOSPITAL ENCOUNTER (OUTPATIENT)
Dept: PHYSICAL THERAPY | Facility: HOSPITAL | Age: 80
Setting detail: THERAPIES SERIES
Discharge: HOME | End: 2020-07-03

## 2020-07-03 DIAGNOSIS — S81.802D OPEN WOUND OF LEFT LOWER EXTREMITY, SUBSEQUENT ENCOUNTER: Primary | ICD-10-CM

## 2020-07-03 PROCEDURE — 97610 LOW FREQUENCY NON-THERMAL US: CPT

## 2020-07-03 PROCEDURE — 97597 DBRDMT OPN WND 1ST 20 CM/<: CPT

## 2020-07-03 NOTE — THERAPY WOUND CARE TREATMENT
Outpatient Rehabilitation - Wound/Debridement Treatment Note   [place]     Patient Name: Joanne Tineo  : 1940  MRN: [id number]  Today's Date: 7/3/2020                 Admit Date: 7/3/2020    Visit Dx:    ICD-10-CM ICD-9-CM   1. Open wound of left lower extremity, subsequent encounter S81.802D V58.89     891.0       Patient Active Problem List   Diagnosis   • Depression   • Hearing impairment   • Hyperlipidemia   • Irritable bowel syndrome   • Left anterior fascicular block   • Macular degeneration   • Dyssomnia   • Vitamin B deficiency   • Hyperglycemia   • Family history of heart disease   • Vitamin D deficiency        Past Medical History:   Diagnosis Date   • Chronic anxiety Adulthood    Intermittent clonazepam   • Chronic lower back pain    • Depression 1984 - acute flare with 's death   • Fracture of pelvis (CMS/[facility])     Routine healing   • Hearing impairment 2014    Corrects with bilateral hearing aids   • Hyperlipidemia     Total greater than 300   • Irritable bowel syndrome    • Macular degeneration    • Pneumonia     Treated outpatient   • Post menopausal syndrome    • Prediabetes     Hemoglobin A1c 6.0%.   • Sleep disorder    • Vitamin D deficiency         Past Surgical History:   Procedure Laterality Date   • COLONOSCOPY  ,    • DIAGNOSTIC LAPAROSCOPY  1965    for infertility   • TONSILLECTOMY  1943         EVALUATION  PT Ortho     Row Name 20 1030       Subjective Comments    Subjective Comments  Pt reports she started oral Cephalexin x7 days.  Saw a vascular doctor, who has ordered further tests to assess venous flow in LLE, recommended using compression stockings, which pt has ordered.  -JM       Subjective Pain    Able to rate subjective pain?  yes  -JM    Pre-Treatment Pain Level  0  -JM    Post-Treatment Pain Level  0  -JM    Subjective Pain Comment  min pain with debridement only  -       Transfers    Sit-Stand [place]  "(Transfers)  independent  -JM    Stand-Sit [place] (Transfers)  independent  -JM    Comment (Transfers)  seated for tx  -JM       Gait/Stairs Assessment/Training    [place] Level (Gait)  independent  -      User Key  (r) = Recorded By, (t) = Taken By, (c) = Cosigned By    Initials Name Provider Type    Laquita Ortega, PT Physical Therapist          [place] Wound     Row Name 07/03/20 1030             Wound 06/08/20 1015 Left anterior;lower leg Soft Tissue Necrosis    Wound - Properties Group Date first assessed: 06/08/20  -MW Time first assessed: 1015  -MW Present on Hospital Admission: Y  -MW Side: Left  -MW Orientation: anterior;lower  -MW Location: leg  -MW Primary Wound Type: Soft tissue  -MW Additional Comments: non healing surgical wound; s/p skin graft   -    Dressing Appearance  intact;moist drainage  -      Base  yellow;slough;moist;pink;red;granulating;epithelialization  -      Periwound  intact;warm;blanchable;pink;macerated;moist scant maceration   -      Periwound Temperature  warm  -      Periwound Skin Turgor  soft  -JM      Edges  irregular;open  -JM      Drainage Characteristics/Odor  serous;yellow  -JM      Drainage Amount  small  -JM      Care, Wound  cleansed with;wound cleanser;debrided;ultrasound therapy, non contact low frequency MIST x6min  -JM      Dressing Care, Wound  dressing applied;antimicrobial agent applied;foam;border dressing sorbact, HFbt, 4\" optifoam, size 4 compressogrip  -      Periwound Care, Wound  barrier ointment applied;cleansed with pH balanced cleanser;dry periwound area maintained z-guard  -        User Key  (r) = Recorded By, (t) = Taken By, (c) = Cosigned By    Initials Name Provider Type    Lakesha Fonseca, PT Physical Therapist    Laquita Ortega, PT Physical Therapist            WOUND DEBRIDEMENT  Total area of Debridement: 2cmsq  Debridement Site 1  Location- Site 1: LLE  Selective Debridement- Site 1: Wound Surface " <20cmsq  Instruments- Site 1: scapel, #10, tweezers  Excised Tissue Description- Site 1: moderate, slough  Bleeding- Site 1: scant             Therapy Education     Row Name 07/03/20 1030             Therapy Education    Given  Bandaging/dressing change;Symptoms/condition management  -      Program  Reinforced  -      How Provided  Verbal;Demonstration  -      Provided to  Patient  -      Level of Understanding  Teach back education performed;Verbalized  -        User Key  (r) = Recorded By, (t) = Taken By, (c) = Cosigned By    Initials Name Provider Type    Laquita Ortega, PT Physical Therapist          Recommendation and Plan  PT Assessment/Plan     Row Name 07/03/20 1030          PT Assessment    Functional Limitations  Performance in self-care ADL;Other (comment) wound mgmt  -     Impairments  Integumentary integrity  -     Assessment Comments  LLE wound improving with less drainage, no odor, a few graulation buds present under biofilm after debridement today.  Pt also now on oral abx.  Will continue with current POC.  -        PT Plan    PT Frequency  2x/week  -     Physical Therapy Interventions (Optional Details)  patient/family education;wound care  -     PT Plan Comments  MIST, debridement, ?add huan  -       User Key  (r) = Recorded By, (t) = Taken By, (c) = Cosigned By    Initials Name Provider Type    Laquita Ortega, PT Physical Therapist          Goals  PT OP Goals     Row Name 07/03/20 1030          Time Calculation    PT Goal Re-Cert Due Date  09/08/20  -       User Key  (r) = Recorded By, (t) = Taken By, (c) = Cosigned By    Initials Name Provider Type    Laquita Ortega, PT Physical Therapist          PT Goal Re-Cert Due Date: 09/08/20            Time Calculation: Start Time: 1030  Therapy Charges for Today     Code Description Service Date Service Provider Modifiers Qty    45304582884 HC EVERARDO DEBRIDE OPEN WOUND UP TO 20CM 7/3/2020 Laquita Matamoros, PT  GP 1    [id number]  PT NLFU MIST 7/3/2020 Laquita Matamoros, PT GP 1                  Laquita Matamoros, PT  7/3/2020

## 2020-07-06 ENCOUNTER — HOSPITAL ENCOUNTER (OUTPATIENT)
Dept: PHYSICAL THERAPY | Facility: HOSPITAL | Age: 80
Setting detail: THERAPIES SERIES
Discharge: HOME | End: 2020-07-06

## 2020-07-06 ENCOUNTER — TELEPHONE (OUTPATIENT)
Dept: INTERNAL MEDICINE | Facility: CLINIC | Age: 80
End: 2020-07-06

## 2020-07-06 DIAGNOSIS — S81.802D OPEN WOUND OF LEFT LOWER EXTREMITY, SUBSEQUENT ENCOUNTER: Primary | ICD-10-CM

## 2020-07-06 PROCEDURE — 97610 LOW FREQUENCY NON-THERMAL US: CPT | Performed by: PHYSICAL THERAPIST

## 2020-07-06 PROCEDURE — 97597 DBRDMT OPN WND 1ST 20 CM/<: CPT | Performed by: PHYSICAL THERAPIST

## 2020-07-06 RX ORDER — ATORVASTATIN CALCIUM 80 MG/1
80 TABLET, FILM COATED ORAL DAILY
Qty: 90 TABLET | Refills: 1 | Status: SHIPPED | OUTPATIENT
Start: 2020-07-06 | End: 2020-09-16

## 2020-07-06 NOTE — THERAPY PROGRESS REPORT/RE-CERT
Outpatient Rehabilitation - Wound/Debridement Progress Note   [place]     Patient Name: Joanne Tineo  : 1940  MRN: [id number]  Today's Date: 2020                 Admit Date: 2020    Visit Dx:    ICD-10-CM ICD-9-CM   1. Open wound of left lower extremity, subsequent encounter S81.802D V58.89     891.0       Patient Active Problem List   Diagnosis   • Depression   • Hearing impairment   • Hyperlipidemia   • Irritable bowel syndrome   • Left anterior fascicular block   • Macular degeneration   • Dyssomnia   • Vitamin B deficiency   • Hyperglycemia   • Family history of heart disease   • Vitamin D deficiency        Past Medical History:   Diagnosis Date   • Chronic anxiety Adulthood    Intermittent clonazepam   • Chronic lower back pain    • Depression 1984 - acute flare with 's death   • Fracture of pelvis (CMS/[facility])     Routine healing   • Hearing impairment 2014    Corrects with bilateral hearing aids   • Hyperlipidemia     Total greater than 300   • Irritable bowel syndrome    • Macular degeneration    • Pneumonia     Treated outpatient   • Post menopausal syndrome    • Prediabetes     Hemoglobin A1c 6.0%.   • Sleep disorder    • Vitamin D deficiency         Past Surgical History:   Procedure Laterality Date   • COLONOSCOPY  ,    • DIAGNOSTIC LAPAROSCOPY  1965    for infertility   • TONSILLECTOMY  1943         PT Ortho     Row Name 20 1030       Subjective Comments    Subjective Comments  Pt reports the vascular PA helped her order some compression stockings; she didn't wear them today as they are difficult to get on and off. but feels they have helped some.   -MW       Subjective Pain    Able to rate subjective pain?  yes  -MW    Pre-Treatment Pain Level  0  -MW    Post-Treatment Pain Level  0  -MW       Transfers    Sit-Stand [place] (Transfers)  independent  -MW    Stand-Sit [place] (Transfers)  independent  -MW    Comment  "(Transfers)  seated for tx  -MW       Gait/Stairs Assessment/Training    [place] Level (Gait)  independent  -MW      User Key  (r) = Recorded By, (t) = Taken By, (c) = Cosigned By    Initials Name Provider Type    Lakesha Fonseca, PT Physical Therapist          LDA Wound     Row Name 07/06/20 1030             Wound 06/08/20 1015 Left anterior;lower leg Soft Tissue Necrosis    Wound - Properties Group Date first assessed: 06/08/20  -MW Time first assessed: 1015  -MW Present on Hospital Admission: Y  -MW Side: Left  -MW Orientation: anterior;lower  -MW Location: leg  -MW Primary Wound Type: Soft tissue  -MW Additional Comments: non healing surgical wound; s/p skin graft   -MW    Wound Image  Images linked: 1  -MW      Dressing Appearance  intact;moist drainage  -MW      Base  yellow;slough;moist;pink;red;granulating;epithelialization  -MW      Periwound  intact;warm;blanchable;pink bumpy texture post debridement of dry skin crusts   -MW      Periwound Temperature  warm  -MW      Periwound Skin Turgor  soft  -MW      Edges  irregular;open  -MW      Wound Length (cm)  1.5 cm  -MW      Wound Width (cm)  0.8 cm  -MW      Wound Depth (cm)  0.2 cm  -MW      Drainage Characteristics/Odor  serous;yellow  -MW      Drainage Amount  small  -MW      Care, Wound  cleansed with;wound cleanser;debrided;ultrasound therapy, non contact low frequency;honey applied MIST x 5 min  -MW      Dressing Care, Wound  dressing applied;antimicrobial agent applied;foam;border dressing [place], [place] sorbact, HFBt, 4\" optifoam, compresso  -MW      Periwound Care, Wound  cleansed with pH balanced cleanser;dry periwound area maintained;barrier ointment applied very thin layer z guard   -MW        User Key  (r) = Recorded By, (t) = Taken By, (c) = Cosigned By    Initials Name Provider Type    Lakesha Fonseca, SNOW Physical Therapist            WOUND DEBRIDEMENT  Total area of Debridement: ~2 cm2   Debridement Site 1  Location- Site " "1: LLE  Selective Debridement- Site 1: Wound Surface <20cmsq  Instruments- Site 1: tweezers  Excised Tissue Description- Site 1: minimum, moderate, slough  Bleeding- Site 1: seeping, scant                 Recommendation and Plan  PT Assessment/Plan     Row Name 07/06/20 1030          PT Assessment    Functional Limitations  Performance in self-care ADL;Other (comment) wound mgmt  -MW     Impairments  Integumentary integrity  -MW     Assessment Comments  LLE wound continues to decrease in area, as new epithelial tissue has grown along the edges. A layer of thick skin crust/ flake was debrided from the periwound area; new epithelial tissue beneath has an uneven, \"bumpy\" texture. Residual wound area with mixed wound bed; 2 deeper areas with yellow fibrous base (thin layer of tissue over tibial crest). Consider addition of collagen dressing ot support granulation tissue formation over rambo areas. No s/s of infection at this time. Cont MIST to promote increased localized blood flow to support granulation formation and loosening of nonviable tissues, and to decrease bioburden. Cont PT wound care to promote wound closure. Consider decreasing frequency as progress continues.   -MW     Rehab Potential  Good  -MW     Patient/caregiver participated in establishment of treatment plan and goals  Yes  -MW     Patient would benefit from skilled therapy intervention  Yes  -MW        PT Plan    PT Frequency  2x/week;1x/week consider decreasing to 1 x wk as progress continues   -MW     Predicted Duration of Therapy Intervention (Therapy Eval)  2 months   -MW     Planned CPT's?  PT EVERARDO DEBRIDE OPEN WOUND UP TO 20 CM: 91345;PT NLFU MIST: [id number];PT NONSELECT DEBRIDE 15 MIN: 49102;PT SELF CARE/MGMT/TRAIN 15 MIN: 31308  -MW     Physical Therapy Interventions (Optional Details)  wound care;patient/family education  -     PT Plan Comments  MIST, debridement, ?add huan  -MW       User Key  (r) = Recorded By, (t) = Taken By, (c) = " Cosigned By    Initials Name Provider Type    Lakesha Fonseca, PT Physical Therapist          Goals  PT OP Goals     Row Name 07/06/20 1030          STG Date to Achieve  07/06/20  -MW    STG 1  Pt independent with clean home dressing changes   -MW    STG 1 Progress  Ongoing  -MW    STG 2  Pt able to verbalize s/s of infection and when to seek additional care (ED or PCP).   -MW    STG 2 Progress  Partially Met  -MW    STG 3  Decrease nonviable tissue to less than 10% of wound area to promote clean wound bed for healing.   -MW    STG 3 Progress  Met  -MW    STG 4  Decrease wound dimension at least 25% to demonstrate healing.   -MW    STG 4 Progress  Met  -MW    STG 5  No s/s of infection.   -MW    STG 5 Progress  Partially Met  -MW          LTG 1  Decrease nonviable tissue to less than 5% of wound area to promote clean wound bed for healing.   -MW    LTG 1 Progress  Ongoing  -MW    LTG 2  Decrease wound dimension at least 75% to demonstrate healing.   -MW    LTG 2 Progress  Progressing  -MW          PT Goal Re-Cert Due Date  09/08/20  -MW      User Key  (r) = Recorded By, (t) = Taken By, (c) = Cosigned By    Initials Name Provider Type    Lakesha Fonseca, PT Physical Therapist          Time Calculation: Start Time: 1030  Therapy Charges for Today     Code Description Service Date Service Provider Modifiers Qty    79816177372 HC PT NLFU MIST 7/6/2020 Lakesha Wesley, PT GP 1    [id number] HC EVERARDO DEBRIDE OPEN WOUND UP TO 20CM 7/6/2020 Lakesha Wesley, PT GP 1                  Lakesha Wesley PT  7/6/2020

## 2020-07-09 ENCOUNTER — HOSPITAL ENCOUNTER (OUTPATIENT)
Dept: PHYSICAL THERAPY | Facility: HOSPITAL | Age: 80
Setting detail: THERAPIES SERIES
Discharge: HOME | End: 2020-07-09

## 2020-07-09 DIAGNOSIS — S81.802D OPEN WOUND OF LEFT LOWER EXTREMITY, SUBSEQUENT ENCOUNTER: Primary | ICD-10-CM

## 2020-07-09 PROCEDURE — 97597 DBRDMT OPN WND 1ST 20 CM/<: CPT

## 2020-07-09 PROCEDURE — 97610 LOW FREQUENCY NON-THERMAL US: CPT

## 2020-07-09 NOTE — THERAPY WOUND CARE TREATMENT
Outpatient Rehabilitation - Wound/Debridement Treatment Note   [place]     Patient Name: Joanne Tineo  : 1940  MRN: [id number]  Today's Date: 2020                 Admit Date: 2020    Visit Dx:    ICD-10-CM ICD-9-CM   1. Open wound of left lower extremity, subsequent encounter S81.802D V58.89     891.0       Patient Active Problem List   Diagnosis   • Depression   • Hearing impairment   • Hyperlipidemia   • Irritable bowel syndrome   • Left anterior fascicular block   • Macular degeneration   • Dyssomnia   • Vitamin B deficiency   • Hyperglycemia   • Family history of heart disease   • Vitamin D deficiency        Past Medical History:   Diagnosis Date   • Chronic anxiety Adulthood    Intermittent clonazepam   • Chronic lower back pain    • Depression 1984 - acute flare with 's death   • Fracture of pelvis (CMS/[facility])     Routine healing   • Hearing impairment     Corrects with bilateral hearing aids   • Hyperlipidemia     Total greater than 300   • Irritable bowel syndrome    • Macular degeneration    • Pneumonia     Treated outpatient   • Post menopausal syndrome    • Prediabetes     Hemoglobin A1c 6.0%.   • Sleep disorder    • Vitamin D deficiency         Past Surgical History:   Procedure Laterality Date   • COLONOSCOPY  2016   • DIAGNOSTIC LAPAROSCOPY  1965    for infertility   • TONSILLECTOMY  1943         EVALUATION  PT Ortho     Row Name 20 9898       Subjective Comments    Subjective Comments  Pt states she had to change the bandage yesterday due to getting it wet in the shower.  Had no difficulty with dressing change.  Has been wearing compression stocking but c/o soreness at the ankle, and difficulty with donning/doffing.  -JM       Subjective Pain    Able to rate subjective pain?  yes  -JM    Pre-Treatment Pain Level  0  -JM    Post-Treatment Pain Level  0  -JM       Transfers    Sit-Stand [place] (Transfers)  independent   "-JM    Stand-Sit [place] (Transfers)  independent  -    Comment (Transfers)  seated for tx  -JM       Gait/Stairs Assessment/Training    [place] Level (Gait)  independent  -      User Key  (r) = Recorded By, (t) = Taken By, (c) = Cosigned By    Initials Name Provider Type    Laquita Ortega, PT Physical Therapist          LDA Wound     Row Name 07/09/20 0930             Wound 06/08/20 1015 Left anterior;lower leg Soft Tissue Necrosis    Wound - Properties Group Date first assessed: 06/08/20  -MW Time first assessed: 1015  -MW Present on Hospital Admission: Y  -MW Side: Left  -MW Orientation: anterior;lower  -MW Location: leg  -MW Primary Wound Type: Soft tissue  -MW Additional Comments: non healing surgical wound; s/p skin graft   -    Dressing Appearance  intact;moist drainage  -      Base  yellow;slough;moist;pink;red;granulating;epithelialization  -      Periwound  intact;warm;blanchable;pink bumpy texture post debridement of dry skin crusts   -      Periwound Temperature  warm  -      Periwound Skin Turgor  soft  -      Edges  irregular;open  -JM      Drainage Characteristics/Odor  serous;yellow  -JM      Drainage Amount  small  -JM      Care, Wound  cleansed with;wound cleanser;debrided;ultrasound therapy, non contact low frequency MIST 6min  -JM      Dressing Care, Wound  dressing applied;collagen;antimicrobial agent applied;foam;border dressing honey, huan, sorbact, HFBt, 4\" optifoam  -      Periwound Care, Wound  barrier ointment applied;cleansed with pH balanced cleanser;dry periwound area maintained z-guard  -        User Key  (r) = Recorded By, (t) = Taken By, (c) = Cosigned By    Initials Name Provider Type    Lakesha Fonseca, PT Physical Therapist    Laquita Ortega, PT Physical Therapist            WOUND DEBRIDEMENT  Total area of Debridement: 2cmsq  Debridement Site 1  Location- Site 1: LLE  Selective Debridement- Site 1: Wound Surface " <20cmsq  Instruments- Site 1: tweezers, #15, scapel  Excised Tissue Description- Site 1: moderate, slough, other (comment)(dry crusts, biofilm)  Bleeding- Site 1: scant             Therapy Education     Row Name 07/09/20 0930             Therapy Education    Education Details  Demonstrated donning stocking by turning inside out.  Also recommended placing a plastic bag on foot for easier donning.  -      Given  Bandaging/dressing change;Symptoms/condition management  -      Program  Reinforced  -      How Provided  Verbal;Demonstration  -      Provided to  Patient  -      Level of Understanding  Teach back education performed;Verbalized  -        User Key  (r) = Recorded By, (t) = Taken By, (c) = Cosigned By    Initials Name Provider Type    Laquita Ortega, PT Physical Therapist          Recommendation and Plan  PT Assessment/Plan     Row Name 07/09/20 0930          PT Assessment    Functional Limitations  Performance in self-care ADL;Other (comment) wound mgmt  -     Impairments  Integumentary integrity  -     Assessment Comments  LLE wound improving, continued new epithelial growth, granulation bud formation.  Progressing well with current tx.  -        PT Plan    PT Frequency  2x/week  -     Physical Therapy Interventions (Optional Details)  patient/family education;wound care  -     PT Plan Comments  MIST, debridement  -       User Key  (r) = Recorded By, (t) = Taken By, (c) = Cosigned By    Initials Name Provider Type    Laquita Ortega, PT Physical Therapist          Goals  PT OP Goals     Row Name 07/09/20 0930          Time Calculation    PT Goal Re-Cert Due Date  09/08/20  -       User Key  (r) = Recorded By, (t) = Taken By, (c) = Cosigned By    Initials Name Provider Type    Laquita Ortega, PT Physical Therapist          PT Goal Re-Cert Due Date: 09/08/20            Time Calculation: Start Time: 0930  Therapy Charges for Today     Code Description Service Date  Service Provider Modifiers Qty    [id number] HC EVERARDO DEBRIDE OPEN WOUND UP TO 20CM 7/9/2020 Laquita Matamoros, PT GP 1    [id number] HC PT NLFU MIST 7/9/2020 Laquita Matamoros, PT GP 1                  Laquita Matamoros, PT  7/9/2020

## 2020-07-14 ENCOUNTER — HOSPITAL ENCOUNTER (OUTPATIENT)
Dept: PHYSICAL THERAPY | Facility: HOSPITAL | Age: 80
Setting detail: THERAPIES SERIES
Discharge: HOME | End: 2020-07-14

## 2020-07-14 DIAGNOSIS — S81.802D OPEN WOUND OF LEFT LOWER EXTREMITY, SUBSEQUENT ENCOUNTER: Primary | ICD-10-CM

## 2020-07-14 PROCEDURE — 97610 LOW FREQUENCY NON-THERMAL US: CPT

## 2020-07-14 PROCEDURE — 97597 DBRDMT OPN WND 1ST 20 CM/<: CPT

## 2020-07-14 NOTE — THERAPY WOUND CARE TREATMENT
Outpatient Rehabilitation - Wound/Debridement Treatment Note   [facility]     Patient Name: Joanne Tineo  : 1940  MRN: [id number]  Today's Date: 2020                 Admit Date: 2020    Visit Dx:    ICD-10-CM ICD-9-CM   1. Open wound of left lower extremity, subsequent encounter S81.802D V58.89     891.0       Patient Active Problem List   Diagnosis   • Depression   • Hearing impairment   • Hyperlipidemia   • Irritable bowel syndrome   • Left anterior fascicular block   • Macular degeneration   • Dyssomnia   • Vitamin B deficiency   • Hyperglycemia   • Family history of heart disease   • Vitamin D deficiency        Past Medical History:   Diagnosis Date   • Chronic anxiety Adulthood    Intermittent clonazepam   • Chronic lower back pain    • Depression 1984 - acute flare with 's death   • Fracture of pelvis (CMS/[facility])     Routine healing   • Hearing impairment 2014    Corrects with bilateral hearing aids   • Hyperlipidemia     Total greater than 300   • Irritable bowel syndrome    • Macular degeneration    • Pneumonia     Treated outpatient   • Post menopausal syndrome    • Prediabetes     Hemoglobin A1c 6.0%.   • Sleep disorder    • Vitamin D deficiency         Past Surgical History:   Procedure Laterality Date   • COLONOSCOPY  2016   • DIAGNOSTIC LAPAROSCOPY  1965    for infertility   • TONSILLECTOMY  1943         EVALUATION  PT Ortho     Row Name 20 1055       Subjective Comments    Subjective Comments  No complaints or changes. Goes back to the vascular MD office tomorrow for an ultrasound.  -MC       Subjective Pain    Able to rate subjective pain?  yes  -MC    Pre-Treatment Pain Level  0  -MC    Post-Treatment Pain Level  0  -MC       Transfers    Sit-Stand [place] (Transfers)  independent  -MC    Stand-Sit [place] (Transfers)  independent  -MC    Comment (Transfers)  seated for tx  -       Gait/Stairs Assessment/Training     "[place] Level (Gait)  independent  -      User Key  (r) = Recorded By, (t) = Taken By, (c) = Cosigned By    Initials Name Provider Type    Pam Ricardo PT Physical Therapist          LDA Wound     Row Name 07/14/20 1055             Wound 06/08/20 1015 Left anterior;lower leg Soft Tissue Necrosis    Wound - Properties Group Date first assessed: 06/08/20  -MW Time first assessed: 1015  -MW Present on Hospital Admission: Y  -MW Side: Left  -MW Orientation: anterior;lower  -MW Location: leg  -MW Primary Wound Type: Soft tissue  -MW Additional Comments: non healing surgical wound; s/p skin graft   -    Wound Image  Images linked: 1  -      Dressing Appearance  intact;moist drainage  -      Base  yellow;slough;moist;pink;red;granulating;epithelialization  -      Periwound  intact;warm;blanchable;pink  -      Periwound Temperature  warm  -      Periwound Skin Turgor  soft  -      Edges  irregular;open  -      Wound Length (cm)  1.2 cm  -      Wound Width (cm)  0.3 cm  -      Wound Depth (cm)  -- obscured by slough, at least 0.2 cm  -      Drainage Characteristics/Odor  serous;yellow  -      Drainage Amount  small  -      Care, Wound  cleansed with;wound cleanser;debrided;ultrasound therapy, non contact low frequency;honey applied MIST 6 minutes  -      Dressing Care, Wound  dressing applied;silver impregnated;collagen;gauze, antimicrobial;antimicrobial agent applied;foam;low-adherent;border dressing honey, huan, sorbact, HFBt, 4\" optifoam  -      Periwound Care, Wound  cleansed with pH balanced cleanser;barrier ointment applied zguard  -        User Key  (r) = Recorded By, (t) = Taken By, (c) = Cosigned By    Initials Name Provider Type    Lakesha Fonseca, PT Physical Therapist    Pam Ricardo PT Physical Therapist            WOUND DEBRIDEMENT  Total area of Debridement: 2 cm2  Debridement Site 1  Location- Site 1: LLE  Selective Debridement- Site 1: Wound " Surface <20cmsq  Instruments- Site 1: #15, scapel, tweezers  Excised Tissue Description- Site 1: moderate, slough, other (comment)(crusted periwound skin)  Bleeding- Site 1: none             Therapy Education     Row Name 07/14/20 105             Therapy Education    Education Details  Continue with current POC.   -      Given  Bandaging/dressing change;Symptoms/condition management  -      Program  Reinforced  -      How Provided  Verbal;Demonstration  -      Provided to  Patient  -      Level of Understanding  Teach back education performed;Verbalized  -        User Key  (r) = Recorded By, (t) = Taken By, (c) = Cosigned By    Initials Name Provider Type    Pam Ricardo, PT Physical Therapist          Recommendation and Plan  PT Assessment/Plan     Row Name 07/14/20 1052          PT Assessment    Functional Limitations  Performance in self-care ADL;Other (comment) wound mgmt  -     Impairments  Integumentary integrity  -     Assessment Comments  Pt with decreasing wound dimensions and improving periwound skin. Pt still with minimal slough/fibrous tissue in the wound base that requires debridement. Pt will continue to benefit from the current POC to promote healing.  -     Rehab Potential  Good  -     Patient/caregiver participated in establishment of treatment plan and goals  Yes  -     Patient would benefit from skilled therapy intervention  Yes  -        PT Plan    PT Frequency  2x/week  -     Physical Therapy Interventions (Optional Details)  patient/family education;wound care  -     PT Plan Comments  MIST, debridement  -       User Key  (r) = Recorded By, (t) = Taken By, (c) = Cosigned By    Initials Name Provider Type    Pam Ricardo PT Physical Therapist          Goals  PT OP Goals     Row Name 07/14/20 2360          Time Calculation    PT Goal Re-Cert Due Date  09/08/20  -       User Key  (r) = Recorded By, (t) = Taken By, (c) = Cosigned By    Initials  Name Provider Type     Pam Oseguera, PT Physical Therapist          PT Goal Re-Cert Due Date: 09/08/20            Time Calculation: Start Time: 1055  Therapy Charges for Today     Code Description Service Date Service Provider Modifiers Qty    08630497450 HC EVERARDO DEBRIDE OPEN WOUND UP TO 20CM 7/14/2020 Pam Oseguera, PT GP 1    [id number] HC PT NLFU MIST 7/14/2020 Pam Oseguera, PT GP 1                  Pam Oseguera, PT  7/14/2020

## 2020-07-17 ENCOUNTER — HOSPITAL ENCOUNTER (OUTPATIENT)
Dept: PHYSICAL THERAPY | Facility: HOSPITAL | Age: 80
Setting detail: THERAPIES SERIES
Discharge: HOME | End: 2020-07-17

## 2020-07-17 DIAGNOSIS — S81.802D OPEN WOUND OF LEFT LOWER EXTREMITY, SUBSEQUENT ENCOUNTER: Primary | ICD-10-CM

## 2020-07-17 PROCEDURE — 97610 LOW FREQUENCY NON-THERMAL US: CPT

## 2020-07-17 PROCEDURE — 97597 DBRDMT OPN WND 1ST 20 CM/<: CPT

## 2020-07-17 NOTE — THERAPY WOUND CARE TREATMENT
Outpatient Rehabilitation - Wound/Debridement Treatment Note   [facility]     Patient Name: Joanne Tineo  : 1940  MRN: [id number]  Today's Date: 2020                 Admit Date: 2020    Visit Dx:    ICD-10-CM ICD-9-CM   1. Open wound of left lower extremity, subsequent encounter S81.802D V58.89     891.0       Patient Active Problem List   Diagnosis   • Depression   • Hearing impairment   • Hyperlipidemia   • Irritable bowel syndrome   • Left anterior fascicular block   • Macular degeneration   • Dyssomnia   • Vitamin B deficiency   • Hyperglycemia   • Family history of heart disease   • Vitamin D deficiency        Past Medical History:   Diagnosis Date   • Chronic anxiety Adulthood    Intermittent clonazepam   • Chronic lower back pain    • Depression 1984 - acute flare with 's death   • Fracture of pelvis (CMS/[facility])     Routine healing   • Hearing impairment 2014    Corrects with bilateral hearing aids   • Hyperlipidemia     Total greater than 300   • Irritable bowel syndrome    • Macular degeneration    • Pneumonia 2016    Treated outpatient   • Post menopausal syndrome    • Prediabetes     Hemoglobin A1c 6.0%.   • Sleep disorder    • Vitamin D deficiency         Past Surgical History:   Procedure Laterality Date   • COLONOSCOPY  ,    • DIAGNOSTIC LAPAROSCOPY  1965    for infertility   • TONSILLECTOMY  1943         EVALUATION  PT Ortho     Row Name 20 1100       Subjective Comments    Subjective Comments  Pt reports donor site prox to wound is a little irritated from the bandage.  -JM       Subjective Pain    Able to rate subjective pain?  yes  -JM    Pre-Treatment Pain Level  0  -JM    Post-Treatment Pain Level  0  -JM       Transfers    Sit-Stand [place] (Transfers)  independent  -JM    Stand-Sit [place] (Transfers)  independent  -JM    Comment (Transfers)  seated for tx  -       Gait/Stairs Assessment/Training    [place]  "Level (Gait)  independent  -      User Key  (r) = Recorded By, (t) = Taken By, (c) = Cosigned By    Initials Name Provider Type    Laquita Ortega, PT Physical Therapist          LDA Wound     Row Name 07/17/20 1100             Wound 06/08/20 1015 Left anterior;lower leg Soft Tissue Necrosis    Wound - Properties Group Date first assessed: 06/08/20  -MW Time first assessed: 1015  -MW Present on Hospital Admission: Y  -MW Side: Left  -MW Orientation: anterior;lower  -MW Location: leg  -MW Primary Wound Type: Soft tissue  -MW Additional Comments: non healing surgical wound; s/p skin graft   -    Dressing Appearance  intact;moist drainage  -      Base  yellow;slough;moist;pink;red;granulating;epithelialization skin bridge centrally  -      Periwound  intact;warm;blanchable;pink min redness of prox donor site under border of dressing  -      Periwound Temperature  warm  -      Periwound Skin Turgor  soft  -      Edges  irregular;open  -      Drainage Characteristics/Odor  serous;yellow  -JM      Drainage Amount  small  -JM      Care, Wound  cleansed with;wound cleanser;debrided;ultrasound therapy, non contact low frequency MIST 6min  -      Dressing Care, Wound  dressing applied;collagen;antimicrobial agent applied;foam;border dressing honey, huan, sorbact, HFbt, 4\"optifoam  -      Periwound Care, Wound  barrier ointment applied;cleansed with pH balanced cleanser;dry periwound area maintained  -        User Key  (r) = Recorded By, (t) = Taken By, (c) = Cosigned By    Initials Name Provider Type    Lakesha Fonseca, PT Physical Therapist    Laquita Ortega, PT Physical Therapist            WOUND DEBRIDEMENT  Total area of Debridement: 1cmsq  Debridement Site 1  Location- Site 1: LLE  Selective Debridement- Site 1: Wound Surface <20cmsq  Instruments- Site 1: #15, scapel, tweezers  Excised Tissue Description- Site 1: minimum, slough, other (comment)(biofilm, periwound hypertrophic " crusting)  Bleeding- Site 1: none             Therapy Education     Row Name 07/17/20 1100             Therapy Education    Education Details  OK to use vitamin E oil or lotion on other dry scaly areas but not immediate wound area.  Discussed that skin irritation should resolve once no longer bandaging leg  -      Given  Bandaging/dressing change;Symptoms/condition management  -      Program  Reinforced  -      How Provided  Verbal;Demonstration  -      Provided to  Patient  -      Level of Understanding  Teach back education performed;Verbalized  -        User Key  (r) = Recorded By, (t) = Taken By, (c) = Cosigned By    Initials Name Provider Type    Laquita Ortega, PT Physical Therapist          Recommendation and Plan  PT Assessment/Plan     Row Name 07/17/20 1100          PT Assessment    Functional Limitations  Performance in self-care ADL;Other (comment) wound mgmt  -     Impairments  Integumentary integrity  -     Assessment Comments  Skin bridging central wound area today, with two residual areas remaining that had biofilm buildup and fibrous tissue in depth, but granulation forming at edges.  Min skin irriation prox to wound at donor site that is under border of optifoam dressing.  PT placed dressing more inferiorly and angled today to reduce area covered by foam border.  Will monitor, irritation likely will resolve once no longer bandaging leg.  -        PT Plan    PT Frequency  2x/week  -     Physical Therapy Interventions (Optional Details)  patient/family education;wound care  -     PT Plan Comments  MIST, debridement  -       User Key  (r) = Recorded By, (t) = Taken By, (c) = Cosigned By    Initials Name Provider Type    Laquita Ortega, PT Physical Therapist          Goals  PT OP Goals     Row Name 07/17/20 1100          Time Calculation    PT Goal Re-Cert Due Date  09/08/20  -       User Key  (r) = Recorded By, (t) = Taken By, (c) = Cosigned By    Initials Name  Provider Type     Laquita Matamoros, PT Physical Therapist          PT Goal Re-Cert Due Date: 09/08/20            Time Calculation: Start Time: 1100  Therapy Charges for Today     Code Description Service Date Service Provider Modifiers Qty    [id number] HC EVERARDO DEBRIDE OPEN WOUND UP TO 20CM 7/17/2020 Laquita Matamoros, PT GP 1    [id number] HC PT NLFU MIST 7/17/2020 Laquita Matamoros, PT GP 1                  Laquita Matamoros, PT  7/17/2020

## 2020-07-21 ENCOUNTER — HOSPITAL ENCOUNTER (OUTPATIENT)
Dept: PHYSICAL THERAPY | Facility: HOSPITAL | Age: 80
Setting detail: THERAPIES SERIES
Discharge: HOME | End: 2020-07-21

## 2020-07-21 DIAGNOSIS — S81.802D OPEN WOUND OF LEFT LOWER EXTREMITY, SUBSEQUENT ENCOUNTER: Primary | ICD-10-CM

## 2020-07-21 PROCEDURE — 97610 LOW FREQUENCY NON-THERMAL US: CPT

## 2020-07-21 PROCEDURE — 97597 DBRDMT OPN WND 1ST 20 CM/<: CPT

## 2020-07-21 NOTE — THERAPY WOUND CARE TREATMENT
Outpatient Rehabilitation - Wound/Debridement Treatment Note   [facility]     Patient Name: Joanne Tineo  : 1940  MRN: [id number]  Today's Date: 2020                 Admit Date: 2020    Visit Dx:    ICD-10-CM ICD-9-CM   1. Open wound of left lower extremity, subsequent encounter S81.802D V58.89     891.0       Patient Active Problem List   Diagnosis   • Depression   • Hearing impairment   • Hyperlipidemia   • Irritable bowel syndrome   • Left anterior fascicular block   • Macular degeneration   • Dyssomnia   • Vitamin B deficiency   • Hyperglycemia   • Family history of heart disease   • Vitamin D deficiency        Past Medical History:   Diagnosis Date   • Chronic anxiety Adulthood    Intermittent clonazepam   • Chronic lower back pain    • Depression 1984 - acute flare with 's death   • Fracture of pelvis (CMS/[facility])     Routine healing   • Hearing impairment     Corrects with bilateral hearing aids   • Hyperlipidemia     Total greater than 300   • Irritable bowel syndrome    • Macular degeneration    • Pneumonia     Treated outpatient   • Post menopausal syndrome    • Prediabetes     Hemoglobin A1c 6.0%.   • Sleep disorder    • Vitamin D deficiency         Past Surgical History:   Procedure Laterality Date   • COLONOSCOPY  2016   • DIAGNOSTIC LAPAROSCOPY  1965    for infertility   • TONSILLECTOMY  1943         EVALUATION  PT Ortho     Row Name 20 1030       Subjective Comments    Subjective Comments  No complaints or changes. Got a good report from the vascular surgeon- no clots, reflux, or other vascular concerns at this time. They suggested continued use of the compression stocking and follow-up with them if the wound fails to heal.  -MC       Subjective Pain    Able to rate subjective pain?  yes  -MC    Pre-Treatment Pain Level  0  -MC    Post-Treatment Pain Level  0  -MC       Transfers    Sit-Stand [place] (Transfers)   "independent  -    Stand-Sit [place] (Transfers)  independent  -    Comment (Transfers)  seated for tx  -       Gait/Stairs Assessment/Training    [place] Level (Gait)  independent  -      User Key  (r) = Recorded By, (t) = Taken By, (c) = Cosigned By    Initials Name Provider Type    Pam Ricardo PT Physical Therapist          LDA Wound     Row Name 07/21/20 1030             Wound 06/08/20 1015 Left anterior;lower leg Soft Tissue Necrosis    Wound - Properties Group Date first assessed: 06/08/20  -MW Time first assessed: 1015  -MW Present on Hospital Admission: Y  -MW Side: Left  -MW Orientation: anterior;lower  -MW Location: leg  -MW Primary Wound Type: Soft tissue  -MW Additional Comments: non healing surgical wound; s/p skin graft   -    Dressing Appearance  intact;moist drainage  -      Base  yellow;slough;moist;pink;red;granulating;epithelialization skin bridge centrally, edges slim  -      Periwound  intact;warm;blanchable;pink redness around donor site improving  -      Periwound Temperature  warm  -      Periwound Skin Turgor  soft  -      Edges  irregular;open  -      Wound Length (cm)  0.6 cm medial/distal area: 0.2 x 0.2 x obscured  -      Wound Width (cm)  0.2 cm  -      Wound Depth (cm)  -- obscured by slough  -      Drainage Characteristics/Odor  serous;yellow  -      Drainage Amount  small  -      Care, Wound  cleansed with;wound cleanser;debrided;ultrasound therapy, non contact low frequency;honey applied MIST 5 minutes  -      Dressing Care, Wound  dressing applied;gauze, antimicrobial;antimicrobial agent applied;foam;low-adherent;border dressing;collagen huan, sorbact, HFBt, 4\" optifoam  -      Periwound Care, Wound  cleansed with pH balanced cleanser;barrier ointment applied;other (see comments) zguard. Reapplied pt's compression stocking  -        User Key  (r) = Recorded By, (t) = Taken By, (c) = Cosigned By    Initials Name " Provider Type    Lakesha Fonseca, PT Physical Therapist    Pam Ricardo, PT Physical Therapist            WOUND DEBRIDEMENT  Total area of Debridement: 0.5 cm2  Debridement Site 1  Location- Site 1: LLE  Selective Debridement- Site 1: Wound Surface <20cmsq  Instruments- Site 1: tweezers  Excised Tissue Description- Site 1: minimum, slough  Bleeding- Site 1: none             Therapy Education     Row Name 07/21/20 1030             Therapy Education    Education Details  Continue with current POC  -MC      Given  Bandaging/dressing change;Symptoms/condition management  -      Program  Reinforced  -MC      How Provided  Verbal;Demonstration  -MC      Provided to  Patient  -MC      Level of Understanding  Verbalized  -        User Key  (r) = Recorded By, (t) = Taken By, (c) = Cosigned By    Initials Name Provider Type    Pam Ricardo PT Physical Therapist          Recommendation and Plan  PT Assessment/Plan     Row Name 07/21/20 1030          PT Assessment    Functional Limitations  Performance in self-care ADL;Other (comment) wound mgmt  -     Impairments  Integumentary integrity  -     Assessment Comments  Pt continues to progress, with two small areas remaining. Both areas demonstrate improvement, with less slough and slim of wound edges. Only minimal slough present for debridement today. Pt will continue to benefit from the current POC to continue progress.  -     Rehab Potential  Good  -     Patient/caregiver participated in establishment of treatment plan and goals  Yes  -     Patient would benefit from skilled therapy intervention  Yes  -MC        PT Plan    PT Frequency  2x/week  -     Physical Therapy Interventions (Optional Details)  patient/family education;wound care  -MC     PT Plan Comments  MIST, debridement  -MC       User Key  (r) = Recorded By, (t) = Taken By, (c) = Cosigned By    Initials Name Provider Type    Pam Ricardo PT Physical Therapist           Goals  PT OP Goals     Row Name 07/21/20 1030          Time Calculation    PT Goal Re-Cert Due Date  09/08/20  -       User Key  (r) = Recorded By, (t) = Taken By, (c) = Cosigned By    Initials Name Provider Type     Pam Oseguera, PT Physical Therapist          PT Goal Re-Cert Due Date: 09/08/20            Time Calculation: Start Time: 1030  Therapy Charges for Today     Code Description Service Date Service Provider Modifiers Qty    [id number]  PT NLFU MIST 7/21/2020 Pam Oseguera, PT GP 1    [id number]  EVERARDO DEBRIDE OPEN WOUND UP TO 20CM 7/21/2020 Pam Oseguera, PT GP 1                  Pam Oseguera, PT  7/21/2020

## 2020-07-24 ENCOUNTER — HOSPITAL ENCOUNTER (OUTPATIENT)
Dept: PHYSICAL THERAPY | Facility: HOSPITAL | Age: 80
Setting detail: THERAPIES SERIES
Discharge: HOME | End: 2020-07-24

## 2020-07-24 DIAGNOSIS — S81.802D OPEN WOUND OF LEFT LOWER EXTREMITY, SUBSEQUENT ENCOUNTER: Primary | ICD-10-CM

## 2020-07-24 PROCEDURE — 97597 DBRDMT OPN WND 1ST 20 CM/<: CPT

## 2020-07-24 PROCEDURE — 97610 LOW FREQUENCY NON-THERMAL US: CPT

## 2020-07-24 NOTE — THERAPY WOUND CARE TREATMENT
Outpatient Rehabilitation - Wound/Debridement Treatment Note   [facility]     Patient Name: Joanne Tineo  : 1940  MRN: [id number]  Today's Date: 2020                 Admit Date: 2020    Visit Dx:    ICD-10-CM ICD-9-CM   1. Open wound of left lower extremity, subsequent encounter S81.802D V58.89     891.0       Patient Active Problem List   Diagnosis   • Depression   • Hearing impairment   • Hyperlipidemia   • Irritable bowel syndrome   • Left anterior fascicular block   • Macular degeneration   • Dyssomnia   • Vitamin B deficiency   • Hyperglycemia   • Family history of heart disease   • Vitamin D deficiency        Past Medical History:   Diagnosis Date   • Chronic anxiety Adulthood    Intermittent clonazepam   • Chronic lower back pain    • Depression 1984 - acute flare with 's death   • Fracture of pelvis (CMS/[facility])     Routine healing   • Hearing impairment 2014    Corrects with bilateral hearing aids   • Hyperlipidemia     Total greater than 300   • Irritable bowel syndrome    • Macular degeneration    • Pneumonia     Treated outpatient   • Post menopausal syndrome    • Prediabetes     Hemoglobin A1c 6.0%.   • Sleep disorder    • Vitamin D deficiency         Past Surgical History:   Procedure Laterality Date   • COLONOSCOPY  ,    • DIAGNOSTIC LAPAROSCOPY  1965    for infertility   • TONSILLECTOMY  1943         EVALUATION  PT Ortho     Row Name 20 0945       Subjective Comments    Subjective Comments  No complaints, has been using moisturizer on donor site and has noticed improvement in the redness.  -JM       Subjective Pain    Able to rate subjective pain?  yes  -JM    Pre-Treatment Pain Level  0  -JM    Post-Treatment Pain Level  0  -JM       Transfers    Sit-Stand Day (Transfers)  independent  -JM    Stand-Sit Day (Transfers)  independent  -JM    Comment (Transfers)  seated for tx  -       Gait/Stairs  "Assessment/Training    [place] Level (Gait)  independent  -      User Key  (r) = Recorded By, (t) = Taken By, (c) = Cosigned By    Initials Name Provider Type    Laquita Ortega, PT Physical Therapist          LDA Wound     Row Name 07/24/20 0945             Wound 06/08/20 1015 Left anterior;lower leg Soft Tissue Necrosis    Wound - Properties Group Date first assessed: 06/08/20  -MW Time first assessed: 1015  -MW Present on Hospital Admission: Y  -MW Side: Left  -MW Orientation: anterior;lower  -MW Location: leg  -MW Primary Wound Type: Soft tissue  -MW Additional Comments: non healing surgical wound; s/p skin graft   -    Dressing Appearance  intact;moist drainage;dried drainage  -      Base  yellow;slough;moist;pink;red;granulating;epithelialization slough superior aspect only, inf aspect dry/crusted  -      Periwound  intact;warm;blanchable;pink redness around donor site improving  -      Periwound Temperature  warm  -      Periwound Skin Turgor  soft  -      Edges  irregular;open  -      Drainage Characteristics/Odor  serous;yellow  -JM      Drainage Amount  small  -JM      Care, Wound  cleansed with;wound cleanser;debrided;ultrasound therapy, non contact low frequency;honey applied MIST 5min  -      Dressing Care, Wound  dressing applied;collagen;antimicrobial agent applied;foam;border dressing huan, sorbact, HFBt, 4\" optifoam  -      Periwound Care, Wound  barrier ointment applied;cleansed with pH balanced cleanser;dry periwound area maintained z-guard  -        User Key  (r) = Recorded By, (t) = Taken By, (c) = Cosigned By    Initials Name Provider Type    Lakesha Fonseca, PT Physical Therapist    Laquita Ortega, PT Physical Therapist            WOUND DEBRIDEMENT  Total area of Debridement: 1cmsq  Debridement Site 1  Location- Site 1: LLE  Selective Debridement- Site 1: Wound Surface <20cmsq  Instruments- Site 1: tweezers, #15, scapel  Excised Tissue " Description- Site 1: minimum, slough, other (comment)(periwound crusts)  Bleeding- Site 1: none             Therapy Education     Row Name 07/24/20 0945             Therapy Education    Given  Bandaging/dressing change;Symptoms/condition management  -      Program  Reinforced  -      How Provided  Verbal;Demonstration  -      Provided to  Patient  -      Level of Understanding  Verbalized  -        User Key  (r) = Recorded By, (t) = Taken By, (c) = Cosigned By    Initials Name Provider Type    Laquita Ortega, PT Physical Therapist          Recommendation and Plan  PT Assessment/Plan     Row Name 07/24/20 0945          PT Assessment    Functional Limitations  Performance in self-care ADL;Other (comment) wound mgmt  -     Impairments  Integumentary integrity  -     Assessment Comments  Inf aspect dry/crusted today, likely will be closed next tx.  Superior aspect still with thin slough layer, clean and pink underneath, decreasing in area.  Pt likely will be appropriate to d/c to home management after next week or at least reduce tx frequency.  -        PT Plan    PT Frequency  2x/week  -     Physical Therapy Interventions (Optional Details)  patient/family education;wound care  -     PT Plan Comments  MIST, debridement  -       User Key  (r) = Recorded By, (t) = Taken By, (c) = Cosigned By    Initials Name Provider Type    Laquita Ortega, PT Physical Therapist          Goals  PT OP Goals     Row Name 07/24/20 0945          Time Calculation    PT Goal Re-Cert Due Date  09/08/20  -       User Key  (r) = Recorded By, (t) = Taken By, (c) = Cosigned By    Initials Name Provider Type    Laquita Ortega, PT Physical Therapist          PT Goal Re-Cert Due Date: 09/08/20            Time Calculation: Start Time: 0945  Therapy Charges for Today     Code Description Service Date Service Provider Modifiers Qty    [id number]  EVERARDO DEBRIDE OPEN WOUND UP TO 20CM 7/24/2020 Laquita Matamoros  ABIEL, PT GP 1    [id number]  PT NLFU MIST 7/24/2020 Laquita Matamoros, PT GP 1                  Laquita Matamoros, PT  7/24/2020

## 2020-07-28 ENCOUNTER — HOSPITAL ENCOUNTER (OUTPATIENT)
Dept: PHYSICAL THERAPY | Facility: HOSPITAL | Age: 80
Setting detail: THERAPIES SERIES
Discharge: HOME | End: 2020-07-28

## 2020-07-28 DIAGNOSIS — S81.802D OPEN WOUND OF LEFT LOWER EXTREMITY, SUBSEQUENT ENCOUNTER: Primary | ICD-10-CM

## 2020-07-28 PROCEDURE — 97610 LOW FREQUENCY NON-THERMAL US: CPT

## 2020-07-28 PROCEDURE — 97597 DBRDMT OPN WND 1ST 20 CM/<: CPT

## 2020-07-31 ENCOUNTER — HOSPITAL ENCOUNTER (OUTPATIENT)
Dept: PHYSICAL THERAPY | Facility: HOSPITAL | Age: 80
Setting detail: THERAPIES SERIES
Discharge: HOME | End: 2020-07-31

## 2020-07-31 DIAGNOSIS — S81.802D OPEN WOUND OF LEFT LOWER EXTREMITY, SUBSEQUENT ENCOUNTER: Primary | ICD-10-CM

## 2020-07-31 PROCEDURE — 97597 DBRDMT OPN WND 1ST 20 CM/<: CPT

## 2020-07-31 PROCEDURE — 97610 LOW FREQUENCY NON-THERMAL US: CPT

## 2020-08-05 ENCOUNTER — HOSPITAL ENCOUNTER (OUTPATIENT)
Dept: PHYSICAL THERAPY | Facility: HOSPITAL | Age: 80
Setting detail: THERAPIES SERIES
Discharge: HOME | End: 2020-08-05

## 2020-08-05 DIAGNOSIS — S81.802D OPEN WOUND OF LEFT LOWER EXTREMITY, SUBSEQUENT ENCOUNTER: Primary | ICD-10-CM

## 2020-08-05 PROCEDURE — 97597 DBRDMT OPN WND 1ST 20 CM/<: CPT

## 2020-08-05 NOTE — THERAPY PROGRESS REPORT/RE-CERT
Outpatient Rehabilitation - Wound/Debridement Progress Note   [facility]     Patient Name: Joanne Tineo  : 1940  MRN: [id number]  Today's Date: 2020                 Admit Date: 2020    Visit Dx:    ICD-10-CM ICD-9-CM   1. Open wound of left lower extremity, subsequent encounter S81.802D V58.89     891.0       Patient Active Problem List   Diagnosis   • Depression   • Hearing impairment   • Hyperlipidemia   • Irritable bowel syndrome   • Left anterior fascicular block   • Macular degeneration   • Dyssomnia   • Vitamin B deficiency   • Hyperglycemia   • Family history of heart disease   • Vitamin D deficiency        Past Medical History:   Diagnosis Date   • Chronic anxiety Adulthood    Intermittent clonazepam   • Chronic lower back pain    • Depression 1984 - acute flare with 's death   • Fracture of pelvis (CMS/[facility])     Routine healing   • Hearing impairment 2014    Corrects with bilateral hearing aids   • Hyperlipidemia     Total greater than 300   • Irritable bowel syndrome    • Macular degeneration    • Pneumonia     Treated outpatient   • Post menopausal syndrome    • Prediabetes     Hemoglobin A1c 6.0%.   • Sleep disorder    • Vitamin D deficiency         Past Surgical History:   Procedure Laterality Date   • COLONOSCOPY  2016   • DIAGNOSTIC LAPAROSCOPY  1965    for infertility   • TONSILLECTOMY  1943         EVALUATION  PT Ortho     Row Name 20 1015       Subjective Comments    Subjective Comments  No complaints or changes.  -MC       Subjective Pain    Able to rate subjective pain?  yes  -MC    Pre-Treatment Pain Level  0  -MC    Post-Treatment Pain Level  0  -MC       Transfers    Sit-Stand [place] (Transfers)  independent  -MC    Stand-Sit [place] (Transfers)  independent  -MC    Comment (Transfers)  seated for tx  -MC       Gait/Stairs Assessment/Training    [place] Level (Gait)  independent  -      User Key  (r) =  "Recorded By, (t) = Taken By, (c) = Cosigned By    Initials Name Provider Type    Pam Ricardo, PT Physical Therapist          LDA Wound     Row Name 08/05/20 1015             Wound 06/08/20 1015 Left anterior;lower leg Soft Tissue Necrosis    Wound - Properties Group Date first assessed: 06/08/20  -MW Time first assessed: 1015  -MW Present on Hospital Admission: Y  -MW Side: Left  -MW Orientation: anterior;lower  -MW Location: leg  -MW Primary Wound Type: Soft tissue  -MW Additional Comments: non healing surgical wound; s/p skin graft   -    Dressing Appearance  intact;no drainage  -      Base  closed/resurfaced;dry;epithelialization;pink  -      Periwound  intact;warm;blanchable;pink min periwound crusting  -      Periwound Temperature  warm  -      Periwound Skin Turgor  soft  -      Drainage Amount  none  -      Care, Wound  cleansed with;wound cleanser;debrided  -      Dressing Care, Wound  dressing applied;low-adherent;foam;border dressing 4\" optifoam gentle  -      Periwound Care, Wound  cleansed with pH balanced cleanser;dry periwound area maintained  -        User Key  (r) = Recorded By, (t) = Taken By, (c) = Cosigned By    Initials Name Provider Type    Lakesha Fonseca, PT Physical Therapist    Pam Ricardo, PT Physical Therapist            WOUND DEBRIDEMENT  Total area of Debridement: 2 cm2  Debridement Site 1  Location- Site 1: LLE  Selective Debridement- Site 1: Wound Surface <20cmsq  Instruments- Site 1: tweezers  Excised Tissue Description- Site 1: moderate, other (comment)(crust over new skin and periwound crust)  Bleeding- Site 1: none             Therapy Education     Row Name 08/05/20 1015             Therapy Education    Education Details  Follow up as needed within 30 days. Keep area covered for 1 week to allow new skin to mature.  -      Given  Bandaging/dressing change;Symptoms/condition management  -      Program  Progressed  -      How Provided "  Verbal;Demonstration  -      Provided to  Patient  -      Level of Understanding  Verbalized  -        User Key  (r) = Recorded By, (t) = Taken By, (c) = Cosigned By    Initials Name Provider Type    Pam Ricardo PT Physical Therapist          Recommendation and Plan  PT Assessment/Plan     Row Name 08/05/20 1015          PT Assessment    Functional Limitations  Performance in self-care ADL;Other (comment) wound mgmt  -     Impairments  Integumentary integrity  -     Assessment Comments  Pt with no remaining open area noted after debridement of overlying crust and periwound crust. Pt has met all her goals for PT wound care and will be tentatively d/c today.  -     Rehab Potential  Good  -     Patient/caregiver participated in establishment of treatment plan and goals  Yes  -     Patient would benefit from skilled therapy intervention  Yes prn  -        PT Plan    PT Frequency  Other (comment) prn  -     Predicted Duration of Therapy Intervention (Therapy Eval)  PRN within 30 days  -     Planned CPT's?  PT NLFU MIST: 67381;PT EVERARDO DEBRIDE OPEN WOUND UP TO 20 CM: [id number]  -     Physical Therapy Interventions (Optional Details)  patient/family education;wound care  -     PT Plan Comments  tentative d/c  -       User Key  (r) = Recorded By, (t) = Taken By, (c) = Cosigned By    Initials Name Provider Type    Pam Ricardo PT Physical Therapist          Goals  PT OP Goals     Row Name 08/05/20 1015          PT Short Term Goals    STG Date to Achieve  07/06/20  -     STG 1  Pt independent with clean home dressing changes   -     STG 1 Progress  Met  -     STG 2  Pt able to verbalize s/s of infection and when to seek additional care (ED or PCP).   -     STG 2 Progress  Met  -     STG 3  Decrease nonviable tissue to less than 10% of wound area to promote clean wound bed for healing.   -     STG 3 Progress  Met  -     STG 4  Decrease wound dimension at least 25% to  demonstrate healing.   -     STG 4 Progress  Met  -     STG 5  No s/s of infection.   -     STG 5 Progress  Met  -        Long Term Goals    LTG 1  Decrease nonviable tissue to less than 5% of wound area to promote clean wound bed for healing.   -     LTG 1 Progress  Met  -     LTG 2  Decrease wound dimension at least 75% to demonstrate healing.   -     LTG 2 Progress  Met  -        Time Calculation    PT Goal Re-Cert Due Date  09/08/20  -       User Key  (r) = Recorded By, (t) = Taken By, (c) = Cosigned By    Initials Name Provider Type    Pam Ricardo, PT Physical Therapist          PT Goal Re-Cert Due Date: 09/08/20  PT Short Term Goals  STG Date to Achieve: 07/06/20  STG 1: Pt independent with clean home dressing changes   STG 1 Progress: Met  STG 2: Pt able to verbalize s/s of infection and when to seek additional care (ED or PCP).   STG 2 Progress: Met  STG 3: Decrease nonviable tissue to less than 10% of wound area to promote clean wound bed for healing.   STG 3 Progress: Met  STG 4: Decrease wound dimension at least 25% to demonstrate healing.   STG 4 Progress: Met  STG 5: No s/s of infection.   STG 5 Progress: Met  Long Term Goals  LTG 1: Decrease nonviable tissue to less than 5% of wound area to promote clean wound bed for healing.   LTG 1 Progress: Met  LTG 2: Decrease wound dimension at least 75% to demonstrate healing.   LTG 2 Progress: Met      Time Calculation: Start Time: 1015  Therapy Charges for Today     Code Description Service Date Service Provider Modifiers Qty    [id number]  EVREARDO DEBRIDE OPEN WOUND UP TO 20CM 8/5/2020 Pam Oseguera, PT GP 1                  Pam Oseguera, PT  8/5/2020

## 2020-09-09 ENCOUNTER — DOCUMENTATION (OUTPATIENT)
Dept: PHYSICAL THERAPY | Facility: HOSPITAL | Age: 80
End: 2020-09-09

## 2020-09-09 NOTE — THERAPY DISCHARGE NOTE
Outpatient Rehabilitation - Wound/Debridement D/C Summary        Patient Name: Joanne Tineo  : 1940  MRN: [id number]  Today's Date: 2020                  Admit Date: (Not on file)    Visit Dx:  No diagnosis found.    Patient Active Problem List   Diagnosis   • Depression   • Hearing impairment   • Hyperlipidemia   • Irritable bowel syndrome   • Left anterior fascicular block   • Macular degeneration   • Dyssomnia   • Vitamin B deficiency   • Hyperglycemia   • Family history of heart disease   • Vitamin D deficiency        Past Medical History:   Diagnosis Date   • Chronic anxiety Adulthood    Intermittent clonazepam   • Chronic lower back pain    • Depression 1984 - acute flare with 's death   • Fracture of pelvis (CMS/[facility])     Routine healing   • Hearing impairment     Corrects with bilateral hearing aids   • Hyperlipidemia     Total greater than 300   • Irritable bowel syndrome    • Macular degeneration    • Pneumonia 2016    Treated outpatient   • Post menopausal syndrome    • Prediabetes     Hemoglobin A1c 6.0%.   • Sleep disorder    • Vitamin D deficiency         Past Surgical History:   Procedure Laterality Date   • COLONOSCOPY  ,    • DIAGNOSTIC LAPAROSCOPY  1965    for infertility   • TONSILLECTOMY           EVALUATION                WOUND DEBRIDEMENT                            Recommendation and Plan      Goals  PT OP Goals     Row Name 20 1208          PT Short Term Goals    STG Date to Achieve  20  -     STG 1  Pt independent with clean home dressing changes   -     STG 1 Progress  Met  -MC     STG 2  Pt able to verbalize s/s of infection and when to seek additional care (ED or PCP).   -MC     STG 2 Progress  Met  -MC     STG 3  Decrease nonviable tissue to less than 10% of wound area to promote clean wound bed for healing.   -MC     STG 3 Progress  Met  -MC     STG 4  Decrease wound dimension at least 25% to demonstrate  healing.   -     STG 4 Progress  Met  -     STG 5  No s/s of infection.   -     STG 5 Progress  Met  -        Long Term Goals    LTG 1  Decrease nonviable tissue to less than 5% of wound area to promote clean wound bed for healing.   -     LTG 1 Progress  Met  -     LTG 2  Decrease wound dimension at least 75% to demonstrate healing.   -     LTG 2 Progress  Met  -       User Key  (r) = Recorded By, (t) = Taken By, (c) = Cosigned By    Initials Name Provider Type    Pam Ricardo, PT Physical Therapist          Time Calculation:              OP Discharge Summary     Row Name 09/09/20 1208             OP PT Discharge Summary    Date of Discharge  09/09/20  -      Reason for Discharge  All goals achieved pt tentatively d/c over 30 days ago due to progress. Has not called for follow up since that time.  -      Outcomes Achieved  Able to achieve all goals within established timeline  -        User Key  (r) = Recorded By, (t) = Taken By, (c) = Cosigned By    Initials Name Provider Type    Pam Ricardo, PT Physical Therapist          Pam Oseguera, PT  9/9/2020

## 2020-09-16 ENCOUNTER — LAB REQUISITION (OUTPATIENT)
Dept: LAB | Facility: HOSPITAL | Age: 80
End: 2020-09-16

## 2020-09-16 ENCOUNTER — OFFICE VISIT (OUTPATIENT)
Dept: INTERNAL MEDICINE | Facility: CLINIC | Age: 80
End: 2020-09-16

## 2020-09-16 VITALS
WEIGHT: 157 LBS | DIASTOLIC BLOOD PRESSURE: 70 MMHG | TEMPERATURE: 97.5 F | OXYGEN SATURATION: 93 % | BODY MASS INDEX: 26.8 KG/M2 | SYSTOLIC BLOOD PRESSURE: 114 MMHG | RESPIRATION RATE: 16 BRPM | HEIGHT: 64 IN | HEART RATE: 71 BPM

## 2020-09-16 DIAGNOSIS — Z11.59 NEED FOR HEPATITIS C SCREENING TEST: ICD-10-CM

## 2020-09-16 DIAGNOSIS — E53.9 VITAMIN B DEFICIENCY: ICD-10-CM

## 2020-09-16 DIAGNOSIS — Z00.00 MEDICARE ANNUAL WELLNESS VISIT, SUBSEQUENT: Primary | ICD-10-CM

## 2020-09-16 DIAGNOSIS — Z00.00 ENCOUNTER FOR WELLNESS EXAMINATION: ICD-10-CM

## 2020-09-16 DIAGNOSIS — R73.9 HYPERGLYCEMIA: ICD-10-CM

## 2020-09-16 DIAGNOSIS — Z23 NEED FOR INFLUENZA VACCINATION: ICD-10-CM

## 2020-09-16 DIAGNOSIS — R73.9 HYPERGLYCEMIA, UNSPECIFIED: ICD-10-CM

## 2020-09-16 DIAGNOSIS — E78.00 PURE HYPERCHOLESTEROLEMIA: ICD-10-CM

## 2020-09-16 DIAGNOSIS — E55.9 VITAMIN D DEFICIENCY: ICD-10-CM

## 2020-09-16 PROCEDURE — G0008 ADMIN INFLUENZA VIRUS VAC: HCPCS | Performed by: NURSE PRACTITIONER

## 2020-09-16 PROCEDURE — G0439 PPPS, SUBSEQ VISIT: HCPCS | Performed by: NURSE PRACTITIONER

## 2020-09-16 PROCEDURE — 36415 COLL VENOUS BLD VENIPUNCTURE: CPT | Performed by: NURSE PRACTITIONER

## 2020-09-16 PROCEDURE — 90694 VACC AIIV4 NO PRSRV 0.5ML IM: CPT | Performed by: NURSE PRACTITIONER

## 2020-09-16 PROCEDURE — 96160 PT-FOCUSED HLTH RISK ASSMT: CPT | Performed by: NURSE PRACTITIONER

## 2020-09-16 PROCEDURE — 99397 PER PM REEVAL EST PAT 65+ YR: CPT | Performed by: NURSE PRACTITIONER

## 2020-09-16 NOTE — PROGRESS NOTES
The ABCs of the Annual Wellness Visit  Subsequent Medicare Wellness Visit    Chief Complaint   Patient presents with   • Annual Exam       Subjective   History of Present Illness:  Joanne Tineo is a 80 y.o. female who presents for a Subsequent Medicare Wellness Visit and wellness visit    HEALTH RISK ASSESSMENT    Recent Hospitalizations:  No hospitalization(s) within the last year.    Current Medical Providers:  Patient Care Team:  Lynda Landrum MD as PCP - General (Internal Medicine)  Yang Cota MD as PCP - Family Medicine    Smoking Status:  Social History     Tobacco Use   Smoking Status Never Smoker   Smokeless Tobacco Never Used       Alcohol Consumption:  Social History     Substance and Sexual Activity   Alcohol Use Yes   • Alcohol/week: 2.0 standard drinks   • Types: 2 Glasses of wine per week       Depression Screen:   PHQ-2/PHQ-9 Depression Screening 9/16/2020   Little interest or pleasure in doing things 0   Feeling down, depressed, or hopeless 0   Trouble falling or staying asleep, or sleeping too much -   Feeling tired or having little energy -   Poor appetite or overeating -   Feeling bad about yourself - or that you are a failure or have let yourself or your family down -   Trouble concentrating on things, such as reading the newspaper or watching television -   Moving or speaking so slowly that other people could have noticed. Or the opposite - being so fidgety or restless that you have been moving around a lot more than usual -   Thoughts that you would be better off dead, or of hurting yourself in some way -   Total Score 0       Fall Risk Screen:  STEADI Fall Risk Assessment has not been completed.    Health Habits and Functional and Cognitive Screening:  Functional & Cognitive Status 9/16/2020   Do you have difficulty preparing food and eating? No   Do you have difficulty bathing yourself, getting dressed or grooming yourself? No   Do you have difficulty using the  toilet? No   Do you have difficulty moving around from place to place? No   Do you have trouble with steps or getting out of a bed or a chair? No   Current Diet Well Balanced Diet   Dental Exam Up to date   Eye Exam Not up to date   Exercise (times per week) 3 times per week   Current Exercise Activities Include Walking   Do you need help using the phone?  No   Are you deaf or do you have serious difficulty hearing?  No   Do you need help with transportation? No   Do you need help shopping? No   Do you need help preparing meals?  No   Do you need help with housework?  No   Do you need help with laundry? No   Do you need help taking your medications? No   Do you need help managing money? No   Do you ever drive or ride in a car without wearing a seat belt? No   Have you felt unusual stress, anger or loneliness in the last month? No   Who do you live with? Other   If you need help, do you have trouble finding someone available to you? No   Have you been bothered in the last four weeks by sexual problems? No   Do you have difficulty concentrating, remembering or making decisions? No         Does the patient have evidence of cognitive impairment? No    Asprin use counseling:Taking ASA appropriately as indicated    Age-appropriate Screening Schedule:  Refer to the list below for future screening recommendations based on patient's age, sex and/or medical conditions. Orders for these recommended tests are listed in the plan section. The patient has been provided with a written plan.    Health Maintenance   Topic Date Due   • ZOSTER VACCINE (2 of 3) 02/26/2015   • COLONOSCOPY  08/11/2021   • LIPID PANEL  09/16/2021   • MAMMOGRAM  10/14/2021   • TDAP/TD VACCINES (3 - Td) 08/27/2029   • INFLUENZA VACCINE  Completed        Fall Risk Assessment was completed, and patient is at low risk for falls.  Fall Risk Assessment was completed, and patient is at low risk for falls.  STEADI Fall Risk Assessment has not been completed.    The  following portions of the patient's history were reviewed and updated as appropriate: allergies, current medications, past family history, past medical history, past social history, past surgical history and problem list.    Outpatient Medications Prior to Visit   Medication Sig Dispense Refill   • aspirin 81 MG tablet Take 1 tablet by mouth. 3-7 days weekly      • atorvastatin (LIPITOR) 80 MG tablet Take 1 tablet by mouth Every Night. 90 tablet 1   • calcium-vitamin D (OSCAL 500/200 D-3) 500-200 MG-UNIT per tablet Take 1 tablet by mouth daily.     • Cholecalciferol (VITAMIN D3) 2000 UNITS tablet Take 1 tablet by mouth Daily.     • clindamycin (CLEOCIN T) 1 % lotion Apply  topically to the appropriate area as directed 2 (Two) Times a Day. 60 mL 0   • clonazePAM (KlonoPIN) 0.5 MG tablet Take 1 tablet by mouth Daily As Needed for Seizures. 10 tablet 4   • Coenzyme Q10 (CO Q-10) 200 MG capsule Take 1 capsule by mouth daily.     • escitalopram (Lexapro) 20 MG tablet Take 1 tablet by mouth Daily. 90 tablet 2   • furosemide (LASIX) 20 MG tablet Take 1 tablet by mouth Daily As Needed (edema). 30 tablet 0   • Multiple Vitamins-Minerals (PRESERVISION AREDS 2) capsule Take 1 capsule by mouth daily.     • Multiple Vitamins-Minerals (WOMENS MULTI VITAMIN & MINERAL) tablet Take 1 tablet by mouth daily.     • mupirocin (BACTROBAN) 2 % ointment Apply  topically to the appropriate area as directed 3 (Three) Times a Day. 1 each 0   • Omega-3 Fatty Acids (FISH OIL) 1000 MG capsule capsule Take 1 capsule by mouth daily.     • psyllium (METAMUCIL) 0.52 G capsule Take 3 capsules by mouth daily.     • atorvastatin (Lipitor) 80 MG tablet Take 1 tablet by mouth Daily. 90 tablet 1     No facility-administered medications prior to visit.        Patient Active Problem List   Diagnosis   • Depression   • Hearing impairment   • Hyperlipidemia   • Irritable bowel syndrome   • Left anterior fascicular block   • Macular degeneration   • Dyssomnia    • Vitamin B deficiency   • Hyperglycemia   • Family history of heart disease   • Vitamin D deficiency       Advanced Care Planning:  ACP discussion was held with the patient during this visit. Patient has an advance directive in EMR which is still valid.  Patient has an advance directive (not in EMR), copy requested.    Review of Systems   Constitutional: Negative for activity change, appetite change, fatigue and unexpected weight change.   HENT: Negative for congestion, ear pain, rhinorrhea, sinus pressure, sore throat and trouble swallowing.    Eyes: Negative for pain and visual disturbance.   Respiratory: Negative for cough, chest tightness, shortness of breath and wheezing.    Cardiovascular: Negative for chest pain, palpitations and leg swelling.   Gastrointestinal: Negative for abdominal pain, blood in stool, constipation, diarrhea, nausea and vomiting.   Endocrine: Negative for cold intolerance, heat intolerance, polydipsia and polyphagia.   Genitourinary: Negative for dysuria, frequency, hematuria, menstrual problem, urgency and vaginal discharge.   Musculoskeletal: Negative for arthralgias, back pain, joint swelling and myalgias.   Skin: Negative for color change, pallor, rash and wound.   Allergic/Immunologic: Negative for environmental allergies, food allergies and immunocompromised state.   Neurological: Negative for dizziness, tremors, seizures, syncope, facial asymmetry, speech difficulty, weakness, numbness and headaches.   Hematological: Negative for adenopathy. Does not bruise/bleed easily.   Psychiatric/Behavioral: Negative for decreased concentration, dysphoric mood, sleep disturbance and suicidal ideas. The patient is nervous/anxious.        Compared to one year ago, the patient feels her physical health is the same.  Compared to one year ago, the patient feels her mental health is the same.    Reviewed chart for potential of high risk medication in the elderly: yes  Reviewed chart for  "potential of harmful drug interactions in the elderly:yes    Objective         Vitals:    09/16/20 1526   BP: 114/70   Pulse: 71   Resp: 16   Temp: 97.5 °F (36.4 °C)   TempSrc: Temporal   SpO2: 93%   Weight: 71.2 kg (157 lb)   Height: 162.6 cm (64\")   PainSc: 0-No pain       Body mass index is 26.95 kg/m².  Discussed the patient's BMI with her. The BMI is above average; BMI management plan is completed.    Physical Exam  Vitals signs and nursing note reviewed.   Constitutional:       General: She is not in acute distress.     Appearance: Normal appearance. She is well-developed. She is not diaphoretic.   HENT:      Head: Normocephalic.      Right Ear: Tympanic membrane and external ear normal. Decreased hearing noted.      Left Ear: Tympanic membrane and external ear normal. Decreased hearing noted.      Nose: Nose normal.      Right Sinus: No maxillary sinus tenderness or frontal sinus tenderness.      Left Sinus: No maxillary sinus tenderness or frontal sinus tenderness.      Mouth/Throat:      Pharynx: No oropharyngeal exudate.   Eyes:      General: No scleral icterus.     Conjunctiva/sclera: Conjunctivae normal.      Pupils: Pupils are equal, round, and reactive to light.   Neck:      Musculoskeletal: Normal range of motion and neck supple. No neck rigidity.      Thyroid: No thyroid mass or thyromegaly.      Vascular: No carotid bruit.      Trachea: No tracheal deviation.   Cardiovascular:      Rate and Rhythm: Normal rate and regular rhythm.      Heart sounds: Normal heart sounds. No murmur. No friction rub. No gallop.    Pulmonary:      Effort: Pulmonary effort is normal. No respiratory distress.      Breath sounds: Normal breath sounds. No wheezing or rales.   Abdominal:      General: Bowel sounds are normal. There is no distension.      Palpations: Abdomen is soft. There is no mass.      Tenderness: There is no abdominal tenderness. There is no guarding or rebound.      Hernia: No hernia is present. "   Musculoskeletal: Normal range of motion.         General: No tenderness or deformity.      Comments: ROM normal with all major joints   Lymphadenopathy:      Head:      Right side of head: No submental, submandibular, tonsillar, preauricular, posterior auricular or occipital adenopathy.      Left side of head: No submental, submandibular, tonsillar, preauricular, posterior auricular or occipital adenopathy.      Cervical: No cervical adenopathy.      Right cervical: No superficial, deep or posterior cervical adenopathy.     Left cervical: No superficial, deep or posterior cervical adenopathy.   Skin:     General: Skin is warm and dry.      Capillary Refill: Capillary refill takes 2 to 3 seconds.      Findings: No rash.      Nails: There is no clubbing.     Neurological:      Mental Status: She is alert and oriented to person, place, and time.      GCS: GCS eye subscore is 4. GCS verbal subscore is 5. GCS motor subscore is 6.      Cranial Nerves: No cranial nerve deficit.      Sensory: No sensory deficit.      Motor: No tremor, atrophy or abnormal muscle tone.      Coordination: Coordination normal.      Gait: Gait normal.   Psychiatric:         Speech: Speech normal.         Behavior: Behavior normal. Behavior is cooperative.         Thought Content: Thought content normal.         Judgment: Judgment normal.         Lab Results   Component Value Date    GLU 95 09/16/2020    CHLPL 127 09/16/2020    TRIG 209 (H) 09/16/2020    HDL 39 (L) 09/16/2020    LDL 46 09/16/2020    VLDL 41.8 09/16/2020    HGBA1C 6.00 (H) 09/16/2020        Assessment/Plan   Medicare Risks and Personalized Health Plan  CMS Preventative Services Quick Reference  Advance Directive Discussion  Breast Cancer/Mammogram Screening  Cardiovascular risk  Fall Risk  Immunizations Discussed/Encouraged (specific immunizations; Influenza )  Obesity/Overweight     The above risks/problems have been discussed with the patient.  Pertinent information has been  shared with the patient in the After Visit Summary.  Follow up plans and orders are seen below in the Assessment/Plan Section.    Diagnoses and all orders for this visit:    1. Medicare annual wellness visit, subsequent (Primary)  Counseled regarding: age-appropriate screening labs and tests, wearing seatbelt and sunscreen regularly  Discussed: regular exercise and diet changes to promote healthy weight, checking skin regularly for abnormal moles and lesions  - flu shot today and labs   2. Encounter for wellness examination  -     CBC & Differential  -     Comprehensive Metabolic Panel  -     TSH Rfx On Abnormal To Free T4    3. Vitamin D deficiency  -     Vitamin D 25 Hydroxy    4. Vitamin B deficiency  -     Vitamin B12    5. Pure hypercholesterolemia  -     Lipid Panel    6. Hyperglycemia  -     Hemoglobin A1c    7. Need for hepatitis C screening test  -     Hepatitis C Antibody    8. Need for influenza vaccination  -     Fluad Quad 65+ yrs ([id number])      Follow Up:  Return in about 3 months (around 12/16/2020) for Recheck.     An After Visit Summary and PPPS were given to the patient.

## 2020-09-16 NOTE — PATIENT INSTRUCTIONS
Mediterranean Diet  A Mediterranean diet refers to food and lifestyle choices that are based on the traditions of countries located on the [place]. This way of eating has been shown to help prevent certain conditions and improve outcomes for people who have chronic diseases, like kidney disease and heart disease.  What are tips for following this plan?  Lifestyle  · Cook and eat meals together with your family, when possible.  · Drink enough fluid to keep your urine clear or pale yellow.  · Be physically active every day. This includes:  ? Aerobic exercise like running or swimming.  ? Leisure activities like gardening, walking, or housework.  · Get 7-8 hours of sleep each night.  · If recommended by your health care provider, drink red wine in moderation. This means 1 glass a day for nonpregnant women and 2 glasses a day for men. A glass of wine equals 5 oz (150 mL).  Reading food labels    · Check the serving size of packaged foods. For foods such as rice and pasta, the serving size refers to the amount of cooked product, not dry.  · Check the total fat in packaged foods. Avoid foods that have saturated fat or trans fats.  · Check the ingredients list for added sugars, such as corn syrup.  Shopping  · At the grocery store, buy most of your food from the areas near the walls of the store. This includes:  ? Fresh fruits and vegetables (produce).  ? Grains, beans, nuts, and seeds. Some of these may be available in unpackaged forms or large amounts (in bulk).  ? Fresh seafood.  ? Poultry and eggs.  ? Low-fat dairy products.  · Buy whole ingredients instead of prepackaged foods.  · Buy fresh fruits and vegetables in-season from local farmers markets.  · Buy frozen fruits and vegetables in resealable bags.  · If you do not have access to quality fresh seafood, buy precooked frozen shrimp or canned fish, such as tuna, salmon, or sardines.  · Buy small amounts of raw or cooked vegetables, salads, or olives from  the deli or salad bar at your store.  · Stock your pantry so you always have certain foods on hand, such as olive oil, canned tuna, canned tomatoes, rice, pasta, and beans.  Cooking  · Cook foods with extra-virgin olive oil instead of using butter or other vegetable oils.  · Have meat as a side dish, and have vegetables or grains as your main dish. This means having meat in small portions or adding small amounts of meat to foods like pasta or stew.  · Use beans or vegetables instead of meat in common dishes like chili or lasagna.  · [place] with different cooking methods. Try roasting or broiling vegetables instead of steaming or sautéeing them.  · Add frozen vegetables to soups, stews, pasta, or rice.  · Add nuts or seeds for added healthy fat at each meal. You can add these to yogurt, salads, or vegetable dishes.  · Marinate fish or vegetables using olive oil, lemon juice, garlic, and fresh herbs.  Meal planning    · Plan to eat 1 vegetarian meal one day each week. Try to work up to 2 vegetarian meals, if possible.  · Eat seafood 2 or more times a week.  · Have healthy snacks readily available, such as:  ? Vegetable sticks with hummus.  ? Greek yogurt.  ? Fruit and nut trail mix.  · Eat balanced meals throughout the week. This includes:  ? Fruit: 2-3 servings a day  ? Vegetables: 4-5 servings a day  ? Low-fat dairy: 2 servings a day  ? Fish, poultry, or lean meat: 1 serving a day  ? Beans and legumes: 2 or more servings a week  ? Nuts and seeds: 1-2 servings a day  ? Whole grains: 6-8 servings a day  ? Extra-virgin olive oil: 3-4 servings a day  · Limit red meat and sweets to only a few servings a month  What are my food choices?  · Mediterranean diet  ? Recommended  § Grains: Whole-grain pasta. Brown rice. Bulgar wheat. Polenta. Couscous. Whole-wheat bread. Oatmeal. Quinoa.  § Vegetables: Artichokes. Beets. Broccoli. Cabbage. Carrots. Eggplant. Green beans. Chard. Kale. Spinach. Onions. Leeks. Peas. Squash.  Tomatoes. Peppers. Radishes.  § Fruits: Apples. Apricots. Avocado. Berries. Bananas. Cherries. Dates. Figs. Grapes. Brigido. Melon. Oranges. Peaches. Plums. Pomegranate.  § Meats and other protein foods: Beans. Almonds. Sunflower seeds. Pine nuts. Peanuts. Cod. [place]. Scallops. Shrimp. Tuna. Tilapia. Clams. Oysters. Eggs.  § Dairy: Low-fat milk. Cheese. Greek yogurt.  § Beverages: Water. Red wine. Herbal tea.  § Fats and oils: Extra virgin olive oil. Avocado oil. Grape seed oil.  § Sweets and desserts: Greek yogurt with honey. Baked apples. Poached pears. Trail mix.  § Seasoning and other foods: Basil. Cilantro. Coriander. Cumin. Mint. Parsley. Dale. Rosemary. Tarragon. Garlic. Oregano. Thyme. Pepper. Balsalmic vinegar. Tahini. Hummus. Tomato sauce. Olives. Mushrooms.  ? Limit these  § Grains: Prepackaged pasta or rice dishes. Prepackaged cereal with added sugar.  § Vegetables: Deep fried potatoes (french fries).  § Fruits: Fruit canned in syrup.  § Meats and other protein foods: Beef. Pork. Lamb. Poultry with skin. Hot dogs. Felix.  § Dairy: Ice cream. Sour cream. Whole milk.  § Beverages: Juice. Sugar-sweetened soft drinks. Beer. Liquor and spirits.  § Fats and oils: Butter. Canola oil. Vegetable oil. Beef fat (tallow). Lard.  § Sweets and desserts: Cookies. Cakes. Pies. Candy.  § Seasoning and other foods: Mayonnaise. Premade sauces and marinades.  The items listed may not be a complete list. Talk with your dietitian about what dietary choices are right for you.  Summary  · The Mediterranean diet includes both food and lifestyle choices.  · Eat a variety of fresh fruits and vegetables, beans, nuts, seeds, and whole grains.  · Limit the amount of red meat and sweets that you eat.  · Talk with your health care provider about whether it is safe for you to drink red wine in moderation. This means 1 glass a day for nonpregnant women and 2 glasses a day for men. A glass of wine equals 5 oz (150 mL).  This information  is not intended to replace advice given to you by your health care provider. Make sure you discuss any questions you have with your health care provider.  Document Released: 08/10/2017 Document Revised: 08/17/2017 Document Reviewed: 08/10/2017  Elsevier Patient Education © 2020 [employer]      Mediterranean Diet  A Mediterranean diet refers to food and lifestyle choices that are based on the traditions of countries located on the [place]. This way of eating has been shown to help prevent certain conditions and improve outcomes for people who have chronic diseases, like kidney disease and heart disease.  What are tips for following this plan?  Lifestyle  · Cook and eat meals together with your family, when possible.  · Drink enough fluid to keep your urine clear or pale yellow.  · Be physically active every day. This includes:  ? Aerobic exercise like running or swimming.  ? Leisure activities like gardening, walking, or housework.  · Get 7-8 hours of sleep each night.  · If recommended by your health care provider, drink red wine in moderation. This means 1 glass a day for nonpregnant women and 2 glasses a day for men. A glass of wine equals 5 oz (150 mL).  Reading food labels    · Check the serving size of packaged foods. For foods such as rice and pasta, the serving size refers to the amount of cooked product, not dry.  · Check the total fat in packaged foods. Avoid foods that have saturated fat or trans fats.  · Check the ingredients list for added sugars, such as corn syrup.  Shopping  · At the grocery store, buy most of your food from the areas near the walls of the store. This includes:  ? Fresh fruits and vegetables (produce).  ? Grains, beans, nuts, and seeds. Some of these may be available in unpackaged forms or large amounts (in bulk).  ? Fresh seafood.  ? Poultry and eggs.  ? Low-fat dairy products.  · Buy whole ingredients instead of prepackaged foods.  · Buy fresh fruits and vegetables  in-season from local farmers markets.  · Buy frozen fruits and vegetables in resealable bags.  · If you do not have access to quality fresh seafood, buy precooked frozen shrimp or canned fish, such as tuna, salmon, or sardines.  · Buy small amounts of raw or cooked vegetables, salads, or olives from the deli or salad bar at your store.  · Stock your pantry so you always have certain foods on hand, such as olive oil, canned tuna, canned tomatoes, rice, pasta, and beans.  Cooking  · Cook foods with extra-virgin olive oil instead of using butter or other vegetable oils.  · Have meat as a side dish, and have vegetables or grains as your main dish. This means having meat in small portions or adding small amounts of meat to foods like pasta or stew.  · Use beans or vegetables instead of meat in common dishes like chili or lasagna.  · [place] with different cooking methods. Try roasting or broiling vegetables instead of steaming or sautéeing them.  · Add frozen vegetables to soups, stews, pasta, or rice.  · Add nuts or seeds for added healthy fat at each meal. You can add these to yogurt, salads, or vegetable dishes.  · Marinate fish or vegetables using olive oil, lemon juice, garlic, and fresh herbs.  Meal planning    · Plan to eat 1 vegetarian meal one day each week. Try to work up to 2 vegetarian meals, if possible.  · Eat seafood 2 or more times a week.  · Have healthy snacks readily available, such as:  ? Vegetable sticks with hummus.  ? Greek yogurt.  ? Fruit and nut trail mix.  · Eat balanced meals throughout the week. This includes:  ? Fruit: 2-3 servings a day  ? Vegetables: 4-5 servings a day  ? Low-fat dairy: 2 servings a day  ? Fish, poultry, or lean meat: 1 serving a day  ? Beans and legumes: 2 or more servings a week  ? Nuts and seeds: 1-2 servings a day  ? Whole grains: 6-8 servings a day  ? Extra-virgin olive oil: 3-4 servings a day  · Limit red meat and sweets to only a few servings a month  What are my  food choices?  · Mediterranean diet  ? Recommended  § Grains: Whole-grain pasta. Brown rice. Bulgar wheat. Polenta. Couscous. Whole-wheat bread. Oatmeal. Quinoa.  § Vegetables: Artichokes. Beets. Broccoli. Cabbage. Carrots. Eggplant. Green beans. Chard. Kale. Spinach. Onions. Leeks. Peas. Squash. Tomatoes. Peppers. Radishes.  § Fruits: Apples. Apricots. Avocado. Berries. Bananas. Cherries. Dates. Figs. Grapes. Brigido. Melon. Oranges. Peaches. Plums. Pomegranate.  § Meats and other protein foods: Beans. Almonds. Sunflower seeds. Pine nuts. Peanuts. Cod. [place]. Scallops. Shrimp. Tuna. Tilapia. Clams. Oysters. Eggs.  § Dairy: Low-fat milk. Cheese. Greek yogurt.  § Beverages: Water. Red wine. Herbal tea.  § Fats and oils: Extra virgin olive oil. Avocado oil. Grape seed oil.  § Sweets and desserts: Greek yogurt with honey. Baked apples. Poached pears. Trail mix.  § Seasoning and other foods: Basil. Cilantro. Coriander. Cumin. Mint. Parsley. [place]. Rosemary. Tarragon. Garlic. Oregano. Thyme. Pepper. Balsalmic vinegar. Tahini. Hummus. Tomato sauce. Olives. Mushrooms.  ? Limit these  § Grains: Prepackaged pasta or rice dishes. Prepackaged cereal with added sugar.  § Vegetables: Deep fried potatoes (french fries).  § Fruits: Fruit canned in syrup.  § Meats and other protein foods: Beef. Pork. Lamb. Poultry with skin. Hot dogs. Felix.  § Dairy: Ice cream. Sour cream. Whole milk.  § Beverages: Juice. Sugar-sweetened soft drinks. Beer. Liquor and spirits.  § Fats and oils: Butter. Canola oil. Vegetable oil. Beef fat (tallow). Lard.  § Sweets and desserts: Cookies. Cakes. Pies. Candy.  § Seasoning and other foods: Mayonnaise. Premade sauces and marinades.  The items listed may not be a complete list. Talk with your dietitian about what dietary choices are right for you.  Summary  · The Mediterranean diet includes both food and lifestyle choices.  · Eat a variety of fresh fruits and vegetables, beans, nuts, seeds, and whole  grains.  · Limit the amount of red meat and sweets that you eat.  · Talk with your health care provider about whether it is safe for you to drink red wine in moderation. This means 1 glass a day for nonpregnant women and 2 glasses a day for men. A glass of wine equals 5 oz (150 mL).  This information is not intended to replace advice given to you by your health care provider. Make sure you discuss any questions you have with your health care provider.  Document Released: 08/10/2017 Document Revised: 08/17/2017 Document Reviewed: 08/10/2017  Elsevier Patient Education © 2020 Elsevier Inc.

## 2020-09-18 LAB
25(OH)D3+25(OH)D2 SERPL-MCNC: 37.1 NG/ML (ref 30–100)
ALBUMIN SERPL-MCNC: 4 G/DL (ref 3.5–5.2)
ALBUMIN/GLOB SERPL: 2 G/DL
ALP SERPL-CCNC: 141 U/L (ref 39–117)
ALT SERPL-CCNC: 38 U/L (ref 1–33)
AST SERPL-CCNC: 18 U/L (ref 1–32)
BASOPHILS # BLD AUTO: 0.02 10*3/MM3 (ref 0–0.2)
BASOPHILS NFR BLD AUTO: 0.3 % (ref 0–1.5)
BILIRUB SERPL-MCNC: 0.2 MG/DL (ref 0–1.2)
BUN SERPL-MCNC: 18 MG/DL (ref 8–23)
BUN/CREAT SERPL: 21.4 (ref 7–25)
CALCIUM SERPL-MCNC: 8.7 MG/DL (ref 8.6–10.5)
CHLORIDE SERPL-SCNC: 104 MMOL/L (ref 98–107)
CHOLEST SERPL-MCNC: 127 MG/DL (ref 0–200)
CO2 SERPL-SCNC: 29 MMOL/L (ref 22–29)
CREAT SERPL-MCNC: 0.84 MG/DL (ref 0.57–1)
EOSINOPHIL # BLD AUTO: 0.07 10*3/MM3 (ref 0–0.4)
EOSINOPHIL NFR BLD AUTO: 1.1 % (ref 0.3–6.2)
ERYTHROCYTE [DISTWIDTH] IN BLOOD BY AUTOMATED COUNT: 13.7 % (ref 12.3–15.4)
GLOBULIN SER CALC-MCNC: 2 GM/DL
GLUCOSE SERPL-MCNC: 95 MG/DL (ref 65–99)
HBA1C MFR BLD: 6 % (ref 4.8–5.6)
HCT VFR BLD AUTO: 42.3 % (ref 34–46.6)
HCV AB S/CO SERPL IA: <0.1 S/CO RATIO (ref 0–0.9)
HDLC SERPL-MCNC: 39 MG/DL (ref 40–60)
HGB BLD-MCNC: 13.9 G/DL (ref 12–15.9)
IMM GRANULOCYTES # BLD AUTO: 0.01 10*3/MM3 (ref 0–0.05)
IMM GRANULOCYTES NFR BLD AUTO: 0.2 % (ref 0–0.5)
LDLC SERPL CALC-MCNC: 46 MG/DL (ref 0–100)
LYMPHOCYTES # BLD AUTO: 1.89 10*3/MM3 (ref 0.7–3.1)
LYMPHOCYTES NFR BLD AUTO: 28.6 % (ref 19.6–45.3)
MCH RBC QN AUTO: 28.7 PG (ref 26.6–33)
MCHC RBC AUTO-ENTMCNC: 32.9 G/DL (ref 31.5–35.7)
MCV RBC AUTO: 87.4 FL (ref 79–97)
MONOCYTES # BLD AUTO: 0.66 10*3/MM3 (ref 0.1–0.9)
MONOCYTES NFR BLD AUTO: 10 % (ref 5–12)
NEUTROPHILS # BLD AUTO: 3.95 10*3/MM3 (ref 1.7–7)
NEUTROPHILS NFR BLD AUTO: 59.8 % (ref 42.7–76)
NRBC BLD AUTO-RTO: 0 /100 WBC (ref 0–0.2)
PLATELET # BLD AUTO: 225 10*3/MM3 (ref 140–450)
POTASSIUM SERPL-SCNC: 4.5 MMOL/L (ref 3.5–5.2)
PROT SERPL-MCNC: 6 G/DL (ref 6–8.5)
RBC # BLD AUTO: 4.84 10*6/MM3 (ref 3.77–5.28)
SODIUM SERPL-SCNC: 144 MMOL/L (ref 136–145)
TRIGL SERPL-MCNC: 209 MG/DL (ref 0–150)
TSH SERPL DL<=0.005 MIU/L-ACNC: 1.73 UIU/ML (ref 0.27–4.2)
VIT B12 SERPL-MCNC: 1235 PG/ML (ref 211–946)
VLDLC SERPL CALC-MCNC: 41.8 MG/DL
WBC # BLD AUTO: 6.6 10*3/MM3 (ref 3.4–10.8)

## 2021-01-31 DIAGNOSIS — E78.00 PURE HYPERCHOLESTEROLEMIA: ICD-10-CM

## 2021-02-01 RX ORDER — ATORVASTATIN CALCIUM 80 MG/1
TABLET, FILM COATED ORAL
Qty: 90 TABLET | Refills: 3 | Status: SHIPPED | OUTPATIENT
Start: 2021-02-01 | End: 2022-02-11

## 2021-03-24 ENCOUNTER — OFFICE VISIT (OUTPATIENT)
Dept: INTERNAL MEDICINE | Facility: CLINIC | Age: 81
End: 2021-03-24

## 2021-03-24 VITALS
TEMPERATURE: 97.3 F | SYSTOLIC BLOOD PRESSURE: 122 MMHG | RESPIRATION RATE: 18 BRPM | HEART RATE: 68 BPM | DIASTOLIC BLOOD PRESSURE: 74 MMHG | HEIGHT: 64 IN | OXYGEN SATURATION: 97 % | WEIGHT: 161 LBS | BODY MASS INDEX: 27.49 KG/M2

## 2021-03-24 DIAGNOSIS — G89.29 CHRONIC BILATERAL LOW BACK PAIN WITHOUT SCIATICA: ICD-10-CM

## 2021-03-24 DIAGNOSIS — M54.50 CHRONIC BILATERAL LOW BACK PAIN WITHOUT SCIATICA: ICD-10-CM

## 2021-03-24 DIAGNOSIS — F41.9 ANXIETY: Primary | ICD-10-CM

## 2021-03-24 DIAGNOSIS — Z78.0 POST-MENOPAUSAL: ICD-10-CM

## 2021-03-24 DIAGNOSIS — Z12.31 ENCOUNTER FOR SCREENING MAMMOGRAM FOR MALIGNANT NEOPLASM OF BREAST: ICD-10-CM

## 2021-03-24 PROCEDURE — 99214 OFFICE O/P EST MOD 30 MIN: CPT | Performed by: INTERNAL MEDICINE

## 2021-03-24 RX ORDER — CLONAZEPAM 0.5 MG/1
0.5 TABLET ORAL DAILY PRN
Qty: 10 TABLET | Refills: 4 | Status: SHIPPED | OUTPATIENT
Start: 2021-03-24 | End: 2021-06-28 | Stop reason: SDUPTHER

## 2021-03-24 NOTE — PROGRESS NOTES
"Subjective   Joanne Tineo is a 80 y.o. female here for follow-up anxiety, low back pain, needs bone scan.  Anxiety: Feels overall anxiety is controlled.  She is on Lexapro and takes Klonopin as needed.  Needs refill, only feels 10 Klonopin at a time as she takes it infrequently.  She would like a DEXA, and has been about 5 years since she had one.  Has chronic low back pain, worse lately.  She has not been exercising or stretching as much as she used to, during Covid she has been quite sedentary and gained a little bit of weight.  She has been going to chiropractor which generally helps, but recently it made her feel \"sore all over.\"  She has recently started Pilates with a private instructor.  She wonders if she can take a Tylenol or an ibuprofen prior to gardening as that is when her back really hurts.    I have reviewed the following portions of the patient's history and confirmed they are accurate: current medications, past medical history, past social history and problem list     I have personally completed the patient's review of systems.    Review of Systems:  General: negative  Neuro: negative  Psych: Anxiety  MSK: See HPI    Objective   /74   Pulse 68   Temp 97.3 °F (36.3 °C) (Infrared)   Resp 18   Ht 162.6 cm (64\")   Wt 73 kg (161 lb)   SpO2 97%   BMI 27.64 kg/m²     Physical Exam  Vitals reviewed.   Constitutional:       Appearance: She is well-developed.   Pulmonary:      Effort: Pulmonary effort is normal.   Skin:     General: Skin is warm and dry.   Neurological:      Mental Status: She is alert and oriented to person, place, and time.   Psychiatric:         Behavior: Behavior normal.         Thought Content: Thought content normal.         Judgment: Judgment normal.         Assessment/Plan   Diagnoses and all orders for this visit:    1. Anxiety (Primary)  -     clonazePAM (KlonoPIN) 0.5 MG tablet; Take 1 tablet by mouth Daily As Needed for Seizures.  Dispense: 10 tablet; Refill: " 4  -Chronic stable, only feels 10 Klonopin at a time  -Continue Lexapro    2. Chronic bilateral low back pain without sciatica  -Recommend patient do Pilates and work on stretching, also seeing chiropractor    3. Post-menopausal  -     DEXA Bone Density Axial    4. Encounter for screening mammogram for malignant neoplasm of breast  -     Mammo Screening Digital Tomosynthesis Bilateral With CAD             Sandie Cortez MA    Please note that portions of this note were completed with a voice recognition program. Efforts were made to edit the dictations, but occasionally words are mistranscribed.

## 2021-04-08 ENCOUNTER — HOSPITAL ENCOUNTER (OUTPATIENT)
Dept: BONE DENSITY | Facility: HOSPITAL | Age: 81
Discharge: HOME | End: 2021-04-08
Admitting: INTERNAL MEDICINE

## 2021-04-08 PROCEDURE — 77080 DXA BONE DENSITY AXIAL: CPT

## 2021-04-09 ENCOUNTER — APPOINTMENT (OUTPATIENT)
Dept: MAMMOGRAPHY | Facility: HOSPITAL | Age: 81
End: 2021-04-09

## 2021-04-15 ENCOUNTER — TELEPHONE (OUTPATIENT)
Dept: INTERNAL MEDICINE | Facility: CLINIC | Age: 81
End: 2021-04-15

## 2021-04-15 NOTE — TELEPHONE ENCOUNTER
----- Message from Lynda Landrum MD sent at 4/13/2021  9:41 AM EDT -----  Please call the patient regarding her abnormal result.  Tell her she has osteopenia which means bone thinning.  She does not have osteoporosis and she is not really close to osteoporosis.  I do recommend a daily calcium and vitamin D supplement, she can get that over-the-counter.  Weightbearing exercise also helps.

## 2021-04-23 ENCOUNTER — HOSPITAL ENCOUNTER (OUTPATIENT)
Dept: MAMMOGRAPHY | Facility: HOSPITAL | Age: 81
Discharge: HOME | End: 2021-04-23
Admitting: INTERNAL MEDICINE

## 2021-04-23 PROCEDURE — 77063 BREAST TOMOSYNTHESIS BI: CPT

## 2021-04-23 PROCEDURE — 77067 SCR MAMMO BI INCL CAD: CPT | Performed by: RADIOLOGY

## 2021-04-23 PROCEDURE — 77063 BREAST TOMOSYNTHESIS BI: CPT | Performed by: RADIOLOGY

## 2021-04-23 PROCEDURE — 77067 SCR MAMMO BI INCL CAD: CPT

## 2021-06-28 ENCOUNTER — OFFICE VISIT (OUTPATIENT)
Dept: INTERNAL MEDICINE | Facility: CLINIC | Age: 81
End: 2021-06-28

## 2021-06-28 VITALS
WEIGHT: 161 LBS | BODY MASS INDEX: 27.49 KG/M2 | TEMPERATURE: 97.6 F | SYSTOLIC BLOOD PRESSURE: 122 MMHG | HEIGHT: 64 IN | DIASTOLIC BLOOD PRESSURE: 70 MMHG

## 2021-06-28 DIAGNOSIS — F41.9 ANXIETY: ICD-10-CM

## 2021-06-28 DIAGNOSIS — M54.42 ACUTE LEFT-SIDED LOW BACK PAIN WITH LEFT-SIDED SCIATICA: Primary | ICD-10-CM

## 2021-06-28 PROCEDURE — 99214 OFFICE O/P EST MOD 30 MIN: CPT | Performed by: INTERNAL MEDICINE

## 2021-06-28 PROCEDURE — 96372 THER/PROPH/DIAG INJ SC/IM: CPT | Performed by: INTERNAL MEDICINE

## 2021-06-28 RX ORDER — KETOROLAC TROMETHAMINE 30 MG/ML
30 INJECTION, SOLUTION INTRAMUSCULAR; INTRAVENOUS ONCE
Status: COMPLETED | OUTPATIENT
Start: 2021-06-28 | End: 2021-06-28

## 2021-06-28 RX ORDER — CLONAZEPAM 0.5 MG/1
0.5 TABLET ORAL DAILY PRN
Qty: 10 TABLET | Refills: 4 | Status: SHIPPED | OUTPATIENT
Start: 2021-06-28 | End: 2022-03-21

## 2021-06-28 RX ADMIN — KETOROLAC TROMETHAMINE 30 MG: 30 INJECTION, SOLUTION INTRAMUSCULAR; INTRAVENOUS at 16:32

## 2021-06-28 NOTE — PROGRESS NOTES
"Subjective   Joanne Tineo is a 81 y.o. female here for acute left-sided low back pain with left-sided sciatica.  She says she has had chronic low back pain for a number of years, and she has flares from time to time depending on what she did.  She is currently in a flare and there is left low back pain into the buttock and down to the toes.  It is not aching, she says \"it is just pain.\"  No burning, no numbness or tingling.  No neurological symptoms.  She is interested in anti-inflammatory.  She has been going to get massages and does feel that that helps, reports that the therapist has been working on some knots.  Would also like refill on Klonopin, uses the medication infrequently and only in high stress situations where she has very high anxiety.  No anxiety today.    I have reviewed the following portions of the patient's history and confirmed they are accurate: current medications, past medical history, past social history and problem list     I have personally completed the patient's review of systems.    Review of Systems:  General: negative  MSK: See HPI  Neuro: Sciatica  Psych: negative    Objective   /70   Temp 97.6 °F (36.4 °C) (Temporal)   Ht 162.6 cm (64\")   Wt 73 kg (161 lb)   BMI 27.64 kg/m²     Physical Exam  Vitals reviewed.   Constitutional:       Appearance: She is well-developed.   Pulmonary:      Effort: Pulmonary effort is normal.   Musculoskeletal:      Lumbar back: Tenderness present.   Skin:     General: Skin is warm and dry.   Neurological:      Mental Status: She is alert and oriented to person, place, and time.   Psychiatric:         Behavior: Behavior normal.         Thought Content: Thought content normal.         Judgment: Judgment normal.         Assessment/Plan   Diagnoses and all orders for this visit:    1. Acute left-sided low back pain with left-sided sciatica (Primary)  -toradol injection today, advised home stretching/PT and continue massage. If she needs " formal PT referral she is to let me know    2. Anxiety  -     clonazePAM (KlonoPIN) 0.5 MG tablet; Take 1 tablet by mouth Daily As Needed for Seizures.  Dispense: 10 tablet; Refill: 4  -Chronic stable, continue Klonopin as needed for high anxiety situations             Victoria Arndt MA    Please note that portions of this note were completed with a voice recognition program. Efforts were made to edit the dictations, but occasionally words are mistranscribed.

## 2021-07-05 PROCEDURE — 87086 URINE CULTURE/COLONY COUNT: CPT | Performed by: NURSE PRACTITIONER

## 2021-07-07 ENCOUNTER — TELEPHONE (OUTPATIENT)
Dept: URGENT CARE | Facility: CLINIC | Age: 81
End: 2021-07-07

## 2021-07-12 DIAGNOSIS — F41.9 ANXIETY AND DEPRESSION: ICD-10-CM

## 2021-07-12 DIAGNOSIS — F32.A ANXIETY AND DEPRESSION: ICD-10-CM

## 2021-07-12 RX ORDER — ESCITALOPRAM OXALATE 20 MG/1
TABLET ORAL
Qty: 90 TABLET | Refills: 3 | Status: SHIPPED | OUTPATIENT
Start: 2021-07-12 | End: 2022-03-01

## 2021-09-21 ENCOUNTER — OFFICE VISIT (OUTPATIENT)
Dept: INTERNAL MEDICINE | Facility: CLINIC | Age: 81
End: 2021-09-21

## 2021-09-21 VITALS
HEART RATE: 70 BPM | DIASTOLIC BLOOD PRESSURE: 74 MMHG | SYSTOLIC BLOOD PRESSURE: 118 MMHG | BODY MASS INDEX: 25.49 KG/M2 | TEMPERATURE: 97.6 F | OXYGEN SATURATION: 95 % | HEIGHT: 65 IN | WEIGHT: 153 LBS

## 2021-09-21 DIAGNOSIS — Z12.11 COLON CANCER SCREENING: ICD-10-CM

## 2021-09-21 DIAGNOSIS — Z13.0 SCREENING FOR BLOOD DISEASE: ICD-10-CM

## 2021-09-21 DIAGNOSIS — E78.00 PURE HYPERCHOLESTEROLEMIA: ICD-10-CM

## 2021-09-21 DIAGNOSIS — Z00.00 MEDICARE ANNUAL WELLNESS VISIT, SUBSEQUENT: Primary | ICD-10-CM

## 2021-09-21 LAB
ALBUMIN SERPL-MCNC: 4.1 G/DL (ref 3.5–5.2)
ALBUMIN/GLOB SERPL: 2 G/DL
ALP SERPL-CCNC: 148 U/L (ref 39–117)
ALT SERPL-CCNC: 36 U/L (ref 1–33)
AST SERPL-CCNC: 20 U/L (ref 1–32)
BILIRUB SERPL-MCNC: 0.7 MG/DL (ref 0–1.2)
BUN SERPL-MCNC: 13 MG/DL (ref 8–23)
BUN/CREAT SERPL: 21 (ref 7–25)
CALCIUM SERPL-MCNC: 9.4 MG/DL (ref 8.6–10.5)
CHLORIDE SERPL-SCNC: 103 MMOL/L (ref 98–107)
CHOLEST SERPL-MCNC: 163 MG/DL (ref 0–200)
CO2 SERPL-SCNC: 27.1 MMOL/L (ref 22–29)
CREAT SERPL-MCNC: 0.62 MG/DL (ref 0.57–1)
ERYTHROCYTE [DISTWIDTH] IN BLOOD BY AUTOMATED COUNT: 13.8 % (ref 12.3–15.4)
GLOBULIN SER CALC-MCNC: 2.1 GM/DL
GLUCOSE SERPL-MCNC: 96 MG/DL (ref 65–99)
HCT VFR BLD AUTO: 44.1 % (ref 34–46.6)
HDLC SERPL-MCNC: 44 MG/DL (ref 40–60)
HGB BLD-MCNC: 14.5 G/DL (ref 12–15.9)
LDLC SERPL CALC-MCNC: 88 MG/DL (ref 0–100)
MCH RBC QN AUTO: 28.5 PG (ref 26.6–33)
MCHC RBC AUTO-ENTMCNC: 32.9 G/DL (ref 31.5–35.7)
MCV RBC AUTO: 86.8 FL (ref 79–97)
PLATELET # BLD AUTO: 215 10*3/MM3 (ref 140–450)
POTASSIUM SERPL-SCNC: 4.6 MMOL/L (ref 3.5–5.2)
PROT SERPL-MCNC: 6.2 G/DL (ref 6–8.5)
RBC # BLD AUTO: 5.08 10*6/MM3 (ref 3.77–5.28)
SODIUM SERPL-SCNC: 143 MMOL/L (ref 136–145)
TRIGL SERPL-MCNC: 182 MG/DL (ref 0–150)
VLDLC SERPL CALC-MCNC: 31 MG/DL (ref 5–40)
WBC # BLD AUTO: 6.15 10*3/MM3 (ref 3.4–10.8)

## 2021-09-21 PROCEDURE — G0439 PPPS, SUBSEQ VISIT: HCPCS | Performed by: INTERNAL MEDICINE

## 2021-09-21 PROCEDURE — 1170F FXNL STATUS ASSESSED: CPT | Performed by: INTERNAL MEDICINE

## 2021-09-21 PROCEDURE — 1126F AMNT PAIN NOTED NONE PRSNT: CPT | Performed by: INTERNAL MEDICINE

## 2021-09-21 PROCEDURE — 96160 PT-FOCUSED HLTH RISK ASSMT: CPT | Performed by: INTERNAL MEDICINE

## 2021-09-21 PROCEDURE — 1159F MED LIST DOCD IN RCRD: CPT | Performed by: INTERNAL MEDICINE

## 2021-09-21 PROCEDURE — 99397 PER PM REEVAL EST PAT 65+ YR: CPT | Performed by: INTERNAL MEDICINE

## 2021-09-21 NOTE — PROGRESS NOTES
The ABCs of the Annual Wellness Visit  Subsequent Medicare Wellness Visit    Chief Complaint   Patient presents with   • Annual Exam      Subjective    History of Present Illness:  Joanne Tineo is a 81 y.o. female who presents for a Subsequent Medicare Wellness Visit.    The following portions of the patient's history were reviewed and   updated as appropriate: allergies, current medications, past medical history, past social history, past surgical history and problem list.     Compared to one year ago, the patient feels her physical   health is the same.    Compared to one year ago, the patient feels her mental   health is the same.    Recent Hospitalizations:  She was not admitted to the hospital during the last year.       Current Medical Providers:  Patient Care Team:  Lynda Landrum MD as PCP - General (Internal Medicine)  Yang Cota MD as PCP - Family Medicine    Outpatient Medications Prior to Visit   Medication Sig Dispense Refill   • atorvastatin (LIPITOR) 80 MG tablet TAKE ONE TABLET BY MOUTH DAILY 90 tablet 3   • calcium-vitamin D (OSCAL 500/200 D-3) 500-200 MG-UNIT per tablet Take 1 tablet by mouth daily.     • Cholecalciferol (VITAMIN D3) 2000 UNITS tablet Take 1 tablet by mouth Daily.     • clonazePAM (KlonoPIN) 0.5 MG tablet Take 1 tablet by mouth Daily As Needed for Seizures. 10 tablet 4   • escitalopram (LEXAPRO) 20 MG tablet TAKE ONE TABLET BY MOUTH DAILY 90 tablet 3   • furosemide (LASIX) 20 MG tablet Take 1 tablet by mouth Daily As Needed (edema). 30 tablet 0   • Multiple Vitamins-Minerals (PRESERVISION AREDS 2) capsule Take 1 capsule by mouth daily.     • Multiple Vitamins-Minerals (WOMENS MULTI VITAMIN & MINERAL) tablet Take 1 tablet by mouth daily.     • Omega-3 Fatty Acids (FISH OIL) 1000 MG capsule capsule Take 1 capsule by mouth daily.     • psyllium (METAMUCIL) 0.52 G capsule Take 3 capsules by mouth daily.       No facility-administered medications prior to  "visit.       No opioid medication identified on active medication list. I have reviewed chart for other potential  high risk medication/s and harmful drug interactions in the elderly.          Aspirin is not on active medication list.  Aspirin use is not indicated based on review of current medical condition/s. Risk of harm outweighs potential benefits.  .    Patient Active Problem List   Diagnosis   • Depression   • Hearing impairment   • Hyperlipidemia   • Irritable bowel syndrome   • Left anterior fascicular block   • Macular degeneration   • Dyssomnia   • Vitamin B deficiency   • Hyperglycemia   • Family history of heart disease   • Vitamin D deficiency     Advance Care Planning   Advance Directive is not on file.  ACP discussion was held with the patient during this visit. Patient does not have an advance directive, information provided.    Review of Systems   Constitutional: Negative.    HENT: Negative.    Eyes: Negative.    Respiratory: Negative.    Cardiovascular: Negative.    Gastrointestinal: Negative.    Endocrine: Negative.    Genitourinary: Negative.    Musculoskeletal: Negative.    Skin: Negative.    Allergic/Immunologic: Negative.    Neurological: Negative.    Hematological: Negative.    Psychiatric/Behavioral: Negative.          Objective       Vitals:    09/21/21 1028   Height: 165.1 cm (65\")   PainSc: 0-No pain     BMI Readings from Last 1 Encounters:   09/21/21 25.79 kg/m²   BMI is above normal parameters. Recommendations include: advised increased exercise    Does the patient have evidence of cognitive impairment? No    Physical Exam  Vitals reviewed.   Constitutional:       General: She is not in acute distress.     Appearance: She is well-developed.   HENT:      Head: Normocephalic and atraumatic.      Right Ear: Tympanic membrane, ear canal and external ear normal.      Left Ear: Tympanic membrane, ear canal and external ear normal.      Nose: Nose normal.   Eyes:      General: Lids are " normal. No scleral icterus.     Conjunctiva/sclera: Conjunctivae normal.      Pupils: Pupils are equal, round, and reactive to light.   Neck:      Thyroid: No thyroid mass or thyromegaly.      Vascular: No carotid bruit.   Cardiovascular:      Rate and Rhythm: Normal rate and regular rhythm.      Heart sounds: Normal heart sounds. No murmur heard.   No friction rub. No gallop. No S3 or S4 sounds.    Pulmonary:      Effort: Pulmonary effort is normal. No respiratory distress.      Breath sounds: Normal breath sounds. No wheezing, rhonchi or rales.   Abdominal:      General: Bowel sounds are normal. There is no distension.      Palpations: Abdomen is soft. There is no mass.      Tenderness: There is no abdominal tenderness.   Musculoskeletal:         General: Normal range of motion.      Cervical back: Neck supple.   Lymphadenopathy:      Cervical: No cervical adenopathy.   Skin:     General: Skin is warm and dry.      Coloration: Skin is not pale.      Findings: No erythema or rash.   Neurological:      Mental Status: She is alert and oriented to person, place, and time.      Cranial Nerves: No cranial nerve deficit.      Sensory: No sensory deficit.   Psychiatric:         Speech: Speech normal.         Behavior: Behavior normal.         Thought Content: Thought content normal.         Judgment: Judgment normal.                 HEALTH RISK ASSESSMENT    Smoking Status:  Social History     Tobacco Use   Smoking Status Never Smoker   Smokeless Tobacco Never Used     Alcohol Consumption:  Social History     Substance and Sexual Activity   Alcohol Use Yes   • Alcohol/week: 2.0 standard drinks   • Types: 2 Glasses of wine per week     Fall Risk Screen:    Critical access hospital Fall Risk Assessment has not been completed.    Depression Screening:  PHQ-2/PHQ-9 Depression Screening 9/21/2021   Little interest or pleasure in doing things 0   Feeling down, depressed, or hopeless 0   Trouble falling or staying asleep, or sleeping too much 0    Feeling tired or having little energy 0   Poor appetite or overeating 0   Feeling bad about yourself - or that you are a failure or have let yourself or your family down 0   Trouble concentrating on things, such as reading the newspaper or watching television 0   Moving or speaking so slowly that other people could have noticed. Or the opposite - being so fidgety or restless that you have been moving around a lot more than usual 0   Thoughts that you would be better off dead, or of hurting yourself in some way 0   Total Score 0       Health Habits and Functional and Cognitive Screening:  Functional & Cognitive Status 9/21/2021   Do you have difficulty preparing food and eating? No   Do you have difficulty bathing yourself, getting dressed or grooming yourself? No   Do you have difficulty using the toilet? No   Do you have difficulty moving around from place to place? No   Do you have trouble with steps or getting out of a bed or a chair? No   Current Diet Low Carb Diet   Dental Exam Up to date   Eye Exam Up to date   Exercise (times per week) 1 times per week   Current Exercises Include Gardening;Walking   Current Exercise Activities Include -   Do you need help using the phone?  No   Are you deaf or do you have serious difficulty hearing?  No   Do you need help with transportation? No   Do you need help shopping? No   Do you need help preparing meals?  No   Do you need help with housework?  No   Do you need help with laundry? No   Do you need help taking your medications? No   Do you need help managing money? No   Do you ever drive or ride in a car without wearing a seat belt? No   Have you felt unusual stress, anger or loneliness in the last month? No   Who do you live with? Alone   If you need help, do you have trouble finding someone available to you? No   Have you been bothered in the last four weeks by sexual problems? No   Do you have difficulty concentrating, remembering or making decisions? No        Age-appropriate Screening Schedule:  Refer to the list below for future screening recommendations based on patient's age, sex and/or medical conditions. Orders for these recommended tests are listed in the plan section. The patient has been provided with a written plan.    Health Maintenance   Topic Date Due   • ZOSTER VACCINE (2 of 3) 02/26/2015   • LIPID PANEL  09/16/2021   • INFLUENZA VACCINE  10/01/2021   • DXA SCAN  04/08/2023   • MAMMOGRAM  04/23/2023   • TDAP/TD VACCINES (4 - Td or Tdap) 08/27/2029              Assessment/Plan     CMS Preventative Services Quick Reference  Risk Factors Identified During Encounter  Immunizations Discussed/Encouraged (specific Immunizations; Tdap, Pneumococcal 23, Shingrix and COVID19  The above risks/problems have been discussed with the patient.  Follow up actions/plans if indicated are seen below in the Assessment/Plan Section.  Pertinent information has been shared with the patient in the After Visit Summary.    Diagnoses and all orders for this visit:    1. Medicare annual wellness visit, subsequent (Primary)  -pt relatively healthy and has potentially 10+ years of life so do rec she continue screening cscope    2. Colon cancer screening  -     Ambulatory Referral For Screening Colonoscopy    3. Pure hypercholesterolemia  -     Lipid Panel    4. Screening for blood disease  -     CBC (No Diff)  -     Comprehensive Metabolic Panel        Follow Up:   No follow-ups on file.     An After Visit Summary and PPPS were given to the patient.

## 2021-10-05 DIAGNOSIS — Z12.11 SCREENING FOR COLON CANCER: Primary | ICD-10-CM

## 2022-02-11 DIAGNOSIS — E78.00 PURE HYPERCHOLESTEROLEMIA: ICD-10-CM

## 2022-02-11 RX ORDER — ATORVASTATIN CALCIUM 80 MG/1
TABLET, FILM COATED ORAL
Qty: 90 TABLET | Refills: 3 | Status: SHIPPED | OUTPATIENT
Start: 2022-02-11 | End: 2023-02-27

## 2022-03-01 ENCOUNTER — OFFICE VISIT (OUTPATIENT)
Dept: INTERNAL MEDICINE | Facility: CLINIC | Age: 82
End: 2022-03-01

## 2022-03-01 VITALS
SYSTOLIC BLOOD PRESSURE: 118 MMHG | OXYGEN SATURATION: 97 % | HEIGHT: 65 IN | TEMPERATURE: 98.6 F | BODY MASS INDEX: 25.66 KG/M2 | WEIGHT: 154 LBS | DIASTOLIC BLOOD PRESSURE: 80 MMHG | RESPIRATION RATE: 20 BRPM | HEART RATE: 79 BPM

## 2022-03-01 DIAGNOSIS — J01.90 ACUTE NON-RECURRENT SINUSITIS, UNSPECIFIED LOCATION: Primary | ICD-10-CM

## 2022-03-01 PROCEDURE — 99213 OFFICE O/P EST LOW 20 MIN: CPT | Performed by: INTERNAL MEDICINE

## 2022-03-01 RX ORDER — AMOXICILLIN AND CLAVULANATE POTASSIUM 875; 125 MG/1; MG/1
1 TABLET, FILM COATED ORAL 2 TIMES DAILY
Qty: 20 TABLET | Refills: 0 | Status: SHIPPED | OUTPATIENT
Start: 2022-03-01 | End: 2022-03-21

## 2022-03-01 NOTE — PROGRESS NOTES
"     Office Note      Date: 2022  Patient Name: Joanne Tineo  MRN: [id number]  : 1940    Chief Complaint   Patient presents with   • Cough     rapid covid yeterday neg       History of Present Illness: Joanne Tineo is a 81 y.o. female who presents for Cough (rapid covid yeterday neg).  Has sinus pressure,  no fever or shortness of breath.  Symptoms have worsened since onset.    Subjective      Review of Systems:   Pertinent review of systems per HPI.    Review of Systems   HENT: Positive for sinus pressure.    Respiratory: Positive for cough.      No Known Allergies    Objective     Physical Exam:  Vital Signs:   Vitals:    22 1156   BP: 118/80   Pulse: 79   Resp: 20   Temp: 98.6 °F (37 °C)   TempSrc: Temporal   SpO2: 97%   Weight: 69.9 kg (154 lb)   Height: 165.1 cm (65\")      Body mass index is 25.63 kg/m².    Physical Exam  Vitals and nursing note reviewed.   Constitutional:       General: She is not in acute distress.     Appearance: She is well-developed.   HENT:      Head: Normocephalic and atraumatic.      Right Ear: Tympanic membrane and external ear normal.      Left Ear: Tympanic membrane and external ear normal.   Cardiovascular:      Rate and Rhythm: Normal rate and regular rhythm.      Heart sounds: Normal heart sounds. No murmur heard.  No friction rub. No gallop.    Pulmonary:      Effort: Pulmonary effort is normal. No respiratory distress.      Breath sounds: Normal breath sounds. No wheezing or rales.   Skin:     General: Skin is warm and dry.      Coloration: Skin is not pale.         Assessment / Plan      Assessment & Plan:    1. Acute non-recurrent sinusitis, unspecified location  Rx for Augmentin.  - amoxicillin-clavulanate (Augmentin) 875-125 MG per tablet; Take 1 tablet by mouth 2 (Two) Times a Day.  Dispense: 20 tablet; Refill: 0      Madonna Oro MD  2022   "

## 2022-03-10 ENCOUNTER — OUTSIDE FACILITY SERVICE (OUTPATIENT)
Dept: GASTROENTEROLOGY | Facility: CLINIC | Age: 82
End: 2022-03-10

## 2022-03-10 PROCEDURE — 45388 COLONOSCOPY W/ABLATION: CPT | Performed by: INTERNAL MEDICINE

## 2022-03-10 PROCEDURE — G0500 MOD SEDAT ENDO SERVICE >5YRS: HCPCS | Performed by: INTERNAL MEDICINE

## 2022-03-21 ENCOUNTER — OFFICE VISIT (OUTPATIENT)
Dept: INTERNAL MEDICINE | Facility: CLINIC | Age: 82
End: 2022-03-21

## 2022-03-21 VITALS
HEIGHT: 65 IN | TEMPERATURE: 97.1 F | WEIGHT: 154.8 LBS | SYSTOLIC BLOOD PRESSURE: 120 MMHG | BODY MASS INDEX: 25.79 KG/M2 | DIASTOLIC BLOOD PRESSURE: 78 MMHG

## 2022-03-21 DIAGNOSIS — H61.22 IMPACTED CERUMEN OF LEFT EAR: ICD-10-CM

## 2022-03-21 DIAGNOSIS — F41.9 ANXIETY: Primary | ICD-10-CM

## 2022-03-21 PROCEDURE — 99213 OFFICE O/P EST LOW 20 MIN: CPT | Performed by: INTERNAL MEDICINE

## 2022-03-21 RX ORDER — ESCITALOPRAM OXALATE 20 MG/1
10 TABLET ORAL DAILY
COMMUNITY
End: 2022-09-09 | Stop reason: SDUPTHER

## 2022-03-21 NOTE — PROGRESS NOTES
"Subjective   Joanne Tineo is a 81 y.o. female here for follow-up anxiety and recent hearing test.  Patient says anxiety has been good, on Lexapro and takes Klonopin as needed for high anxiety, has not needed this recently.  She does not have any acute complaints today.  She did go to have a hearing test recently and they reported that she had some earwax buildup.  She would like me to look at her ears.  Denies any pain or drainage.  Not sure if the cerumen impacts her hearing.    I have reviewed the following portions of the patient's history and confirmed they are accurate: current medications, past medical history, past social history and problem list     I have personally reviewed and performed the ROS. Lynda Landrum MD     Review of Systems:  General: negative  HEENT: Negative  Psych: negative    Objective   /78   Temp 97.1 °F (36.2 °C) (Temporal)   Ht 165.1 cm (65\")   Wt 70.2 kg (154 lb 12.8 oz)   BMI 25.76 kg/m²     Physical Exam  Vitals reviewed.   Constitutional:       Appearance: She is well-developed.   HENT:      Right Ear: Tympanic membrane, ear canal and external ear normal.      Left Ear: Ear canal and external ear normal.      Ears:      Comments: Left partial cerumen impaction, TM not fully visualized  Pulmonary:      Effort: Pulmonary effort is normal.   Skin:     General: Skin is warm and dry.   Neurological:      Mental Status: She is alert and oriented to person, place, and time.   Psychiatric:         Behavior: Behavior normal.         Thought Content: Thought content normal.         Judgment: Judgment normal.         Assessment/Plan   Diagnoses and all orders for this visit:    1. Anxiety (Primary)  -Chronic stable, continue current medications    2. Impacted cerumen of left ear  -Small amount of wax in left ear, could use sweet oil or the like to soften earwax, but no need for disimpaction today           Lynda Landrum MD  Answers for HPI/ROS submitted by " the patient on 3/14/2022  What is the primary reason for your visit?: Physical

## 2022-06-01 ENCOUNTER — TELEPHONE (OUTPATIENT)
Dept: INTERNAL MEDICINE | Facility: CLINIC | Age: 82
End: 2022-06-01

## 2022-06-01 NOTE — TELEPHONE ENCOUNTER
Caller: Joanne Tineo    Relationship: Self    Best call back number: [phone]    What is the best time to reach you: anytime    Who are you requesting to speak with (clinical staff, provider,  specific staff member): Dr. Landrum    Do you know the name of the person who called:     What was the call regarding: patient tested positive for covid on 05/31/22 and would like to know if there is anything she should take or is there anything that can be prescribed    Do you require a callback: YES

## 2022-09-09 ENCOUNTER — LAB (OUTPATIENT)
Dept: LAB | Facility: HOSPITAL | Age: 82
End: 2022-09-09

## 2022-09-09 ENCOUNTER — OFFICE VISIT (OUTPATIENT)
Dept: INTERNAL MEDICINE | Facility: CLINIC | Age: 82
End: 2022-09-09

## 2022-09-09 VITALS
WEIGHT: 149.8 LBS | TEMPERATURE: 96.6 F | HEIGHT: 65 IN | HEART RATE: 80 BPM | BODY MASS INDEX: 24.96 KG/M2 | SYSTOLIC BLOOD PRESSURE: 124 MMHG | OXYGEN SATURATION: 98 % | DIASTOLIC BLOOD PRESSURE: 72 MMHG

## 2022-09-09 DIAGNOSIS — Z23 NEED FOR COVID-19 VACCINE: ICD-10-CM

## 2022-09-09 DIAGNOSIS — F41.1 GAD (GENERALIZED ANXIETY DISORDER): ICD-10-CM

## 2022-09-09 DIAGNOSIS — R53.82 CHRONIC FATIGUE: ICD-10-CM

## 2022-09-09 DIAGNOSIS — R53.82 CHRONIC FATIGUE: Primary | ICD-10-CM

## 2022-09-09 PROCEDURE — 84443 ASSAY THYROID STIM HORMONE: CPT

## 2022-09-09 PROCEDURE — 99214 OFFICE O/P EST MOD 30 MIN: CPT | Performed by: PHYSICIAN ASSISTANT

## 2022-09-09 PROCEDURE — 82607 VITAMIN B-12: CPT

## 2022-09-09 PROCEDURE — 80053 COMPREHEN METABOLIC PANEL: CPT | Performed by: PHYSICIAN ASSISTANT

## 2022-09-09 PROCEDURE — 85025 COMPLETE CBC W/AUTO DIFF WBC: CPT

## 2022-09-09 PROCEDURE — 82746 ASSAY OF FOLIC ACID SERUM: CPT

## 2022-09-09 PROCEDURE — 36415 COLL VENOUS BLD VENIPUNCTURE: CPT | Performed by: PHYSICIAN ASSISTANT

## 2022-09-09 RX ORDER — CLONAZEPAM 1 MG/1
1 TABLET ORAL 2 TIMES DAILY PRN
Status: CANCELLED | OUTPATIENT
Start: 2022-09-09

## 2022-09-09 RX ORDER — ZOSTER VACCINE RECOMBINANT, ADJUVANTED 50 MCG/0.5
KIT INTRAMUSCULAR
COMMUNITY
Start: 2022-08-12 | End: 2022-12-12

## 2022-09-09 RX ORDER — ESCITALOPRAM OXALATE 20 MG/1
20 TABLET ORAL DAILY
Qty: 90 TABLET | Refills: 1 | Status: SHIPPED | OUTPATIENT
Start: 2022-09-09 | End: 2023-03-31 | Stop reason: SDUPTHER

## 2022-09-09 RX ORDER — CLONAZEPAM 1 MG/1
1 TABLET ORAL 2 TIMES DAILY PRN
COMMUNITY
End: 2023-01-17 | Stop reason: SDUPTHER

## 2022-09-09 NOTE — PROGRESS NOTES
[facility]  [address]  [place], located within [facility] [address]  Dept: [phone]  Dept Fax: [phone]  Loc: [phone]  Loc Fax: [phone]    Joanne R Noman  1940    Follow Up Office Visit Note    History of Present Illness:  Patient is an 82-year-old female in today for increased anxiety and chronic fatigue.  Patient on Lexapro 10 mg daily for anxiety.  Taking as directed but still having some panic attacks.  Had to take 0.5 mg of Klonopin at 4:00 this morning due to panic attack.  Patient got this prescription from her previous provider.  She is spread out over the last year.  Patient getting low on this medication and would like a refill.  Denies any SI or HI.    Patient continues to have worsening chronic fatigue.  Would like further evaluation for this.  Due for Medicare wellness visit soon and is scheduled to have this with her PCP within the next couple of weeks.  Patient is going to be traveling to [facility] in the next 3 weeks and would like reassurance that she is healthy to do so.    Patient due for a COVID booster and had COVID at the beginning of June.  Patient curious as to which she should take.  Patient has had Moderna.      The following portions of the patient's history were reviewed and updated as appropriate: allergies, current medications, past family history, past medical history, past social history, past surgical history, and problem list.    Medications:    Current Outpatient Medications:   •  atorvastatin (LIPITOR) 80 MG tablet, TAKE ONE TABLET BY MOUTH DAILY, Disp: 90 tablet, Rfl: 3  •  calcium-vitamin D (OSCAL-500) 500-200 MG-UNIT per tablet, Take 1 tablet by mouth daily., Disp: , Rfl:   •  Cholecalciferol (VITAMIN D3) 2000 UNITS tablet, Take 1 tablet by mouth Daily., Disp: , Rfl:   •  clonazePAM (KlonoPIN) 1 MG tablet, Take 1 mg by mouth 2 (Two) Times a Day As Needed., Disp: , Rfl:   •  escitalopram (LEXAPRO) 20 MG tablet, Take  1 tablet by mouth Daily., Disp: 90 tablet, Rfl: 1  •  furosemide (LASIX) 20 MG tablet, Take 1 tablet by mouth Daily As Needed (edema)., Disp: 30 tablet, Rfl: 0  •  Multiple Vitamins-Minerals (PRESERVISION AREDS 2) capsule, Take 1 capsule by mouth daily., Disp: , Rfl:   •  Omega-3 Fatty Acids (FISH OIL) 1000 MG capsule capsule, Take 1 capsule by mouth daily., Disp: , Rfl:   •  psyllium (METAMUCIL) 0.52 G capsule, Take 3 capsules by mouth daily., Disp: , Rfl:   •  Shingrix 50 MCG/0.5ML reconstituted suspension, , Disp: , Rfl:     Subjective  No Known Allergies     Past Medical History:   Diagnosis Date   • Chronic anxiety Adulthood    Intermittent clonazepam   • Chronic lower back pain    • Depression 1984 - acute flare with 's death   • Fracture of pelvis (HCC)     Routine healing   • Hearing impairment     Corrects with bilateral hearing aids   • Hyperlipidemia     Total greater than 300   • Irritable bowel syndrome    • Macular degeneration    • Pneumonia     Treated outpatient   • Post menopausal syndrome    • Prediabetes     Hemoglobin A1c 6.0%.   • Sleep disorder    • Vitamin D deficiency        Past Surgical History:   Procedure Laterality Date   • COLONOSCOPY  2016   • DIAGNOSTIC LAPAROSCOPY  1965    for infertility   • TONSILLECTOMY  1943       Family History   Problem Relation Age of Onset   • Heart attack Mother          age 84   • Coronary artery disease Father 79        CABG   • Dementia Father          age 90   • Colon cancer Brother 65         age 70   • Heart disease Other         Multiple family members    • Kidney cancer Other             • Breast cancer Neg Hx    • Ovarian cancer Neg Hx         Social History     Socioeconomic History   • Marital status:    Tobacco Use   • Smoking status: Never Smoker   • Smokeless tobacco: Never Used   Substance and Sexual Activity   • Alcohol use: Yes     Alcohol/week: 2.0 standard drinks      "Types: 2 Glasses of wine per week   • Drug use: No       Review of Systems   Constitutional: Positive for fatigue. Negative for activity change, chills, fever and unexpected weight change.   HENT: Negative for congestion, ear pain, postnasal drip, sinus pressure and sore throat.    Eyes: Negative for pain, discharge and redness.   Respiratory: Negative for cough, shortness of breath and wheezing.    Cardiovascular: Negative for chest pain, palpitations and leg swelling.   Gastrointestinal: Negative for diarrhea, nausea and vomiting.   Endocrine: Negative for cold intolerance and heat intolerance.   Genitourinary: Negative for decreased urine volume and dysuria.   Musculoskeletal: Negative for arthralgias and myalgias.   Skin: Negative for rash and wound.   Neurological: Negative for dizziness, light-headedness and headaches.   Hematological: Does not bruise/bleed easily.   Psychiatric/Behavioral: Negative for confusion, dysphoric mood, self-injury, sleep disturbance and suicidal ideas. The patient is nervous/anxious.          Objective  Vitals:    09/09/22 1308   BP: 124/72   BP Location: Left arm   Patient Position: Sitting   Pulse: 80   Temp: 96.6 °F (35.9 °C)   TempSrc: Temporal   SpO2: 98%   Weight: 67.9 kg (149 lb 12.8 oz)   Height: 165.1 cm (65\")     Body mass index is 24.93 kg/m².     Physical Exam  Physical Exam  Vitals and nursing note reviewed.   Constitutional:       General: She is not in acute distress.     Appearance: She is not ill-appearing.   HENT:      Head: Normocephalic.      Right Ear: Tympanic membrane, ear canal and external ear normal. There is no impacted cerumen.      Left Ear: Tympanic membrane, ear canal and external ear normal. There is no impacted cerumen.      Nose: No congestion or rhinorrhea.      Mouth/Throat:      Mouth: Mucous membranes are moist.      Pharynx: Oropharynx is clear. No oropharyngeal exudate or posterior oropharyngeal erythema.   Eyes:      General:         Right " eye: No discharge.         Left eye: No discharge.      Extraocular Movements: Extraocular movements intact.      Conjunctiva/sclera: Conjunctivae normal.      Pupils: Pupils are equal, round, and reactive to light.   Cardiovascular:      Rate and Rhythm: Normal rate and regular rhythm.      Heart sounds: Normal heart sounds. No murmur heard.    No friction rub. No gallop.   Pulmonary:      Effort: Pulmonary effort is normal. No respiratory distress.      Breath sounds: Normal breath sounds. No wheezing.   Abdominal:      General: Bowel sounds are normal. There is no distension.      Palpations: Abdomen is soft. There is no mass.      Tenderness: There is no abdominal tenderness.   Musculoskeletal:         General: No swelling. Normal range of motion.      Cervical back: Normal range of motion. No tenderness.      Right lower leg: No edema.      Left lower leg: No edema.   Lymphadenopathy:      Cervical: No cervical adenopathy.   Skin:     Findings: No bruising, erythema or rash.   Neurological:      Mental Status: She is oriented to person, place, and time.      Gait: Gait normal.   Psychiatric:         Mood and Affect: Mood normal.         Behavior: Behavior normal.         Thought Content: Thought content normal.         Judgment: Judgment normal.         Diagnostic Data  Procedures    Assessment  Diagnoses and all orders for this visit:    1. Chronic fatigue (Primary)  -     CBC w AUTO Differential; Future  -     Comprehensive Metabolic Panel  -     Vitamin B12; Future  -     Folate; Future  -     TSH; Future    2. AARON (generalized anxiety disorder)  -     escitalopram (LEXAPRO) 20 MG tablet; Take 1 tablet by mouth Daily.  Dispense: 90 tablet; Refill: 1    3. Need for COVID-19 vaccine        Plan    1. Chronic fatigue (Primary)- worse, obtain labs per above.    2. AARON (generalized anxiety disorder)- uncontrolled on Lexapro 10 mg daily.  Increase to 20 mg daily.  Will discuss with PCP regarding Klonopin  refill.    3. Need for COVID-19 vaccine- advised patient on which booster to get and when.  Patient to check with local pharmacy to schedule this.  Offered patient to come back to this clinic to get this as well.      Return for Next scheduled follow up.    Jorgito Muñoz PA-C  09/09/2022

## 2022-09-10 LAB
ALBUMIN SERPL-MCNC: 3.9 G/DL (ref 3.5–5.2)
ALBUMIN/GLOB SERPL: 1.6 G/DL
ALP SERPL-CCNC: 101 U/L (ref 39–117)
ALT SERPL W P-5'-P-CCNC: 29 U/L (ref 1–33)
ANION GAP SERPL CALCULATED.3IONS-SCNC: 7.9 MMOL/L (ref 5–15)
AST SERPL-CCNC: 23 U/L (ref 1–32)
BASOPHILS # BLD AUTO: 0.02 10*3/MM3 (ref 0–0.2)
BASOPHILS NFR BLD AUTO: 0.3 % (ref 0–1.5)
BILIRUB SERPL-MCNC: 0.3 MG/DL (ref 0–1.2)
BUN SERPL-MCNC: 18 MG/DL (ref 8–23)
BUN/CREAT SERPL: 23.1 (ref 7–25)
CALCIUM SPEC-SCNC: 9.5 MG/DL (ref 8.6–10.5)
CHLORIDE SERPL-SCNC: 108 MMOL/L (ref 98–107)
CO2 SERPL-SCNC: 28.1 MMOL/L (ref 22–29)
CREAT SERPL-MCNC: 0.78 MG/DL (ref 0.57–1)
DEPRECATED RDW RBC AUTO: 45 FL (ref 37–54)
EGFRCR SERPLBLD CKD-EPI 2021: 76.4 ML/MIN/1.73
EOSINOPHIL # BLD AUTO: 0.04 10*3/MM3 (ref 0–0.4)
EOSINOPHIL NFR BLD AUTO: 0.6 % (ref 0.3–6.2)
ERYTHROCYTE [DISTWIDTH] IN BLOOD BY AUTOMATED COUNT: 13.7 % (ref 12.3–15.4)
FOLATE SERPL-MCNC: 19.4 NG/ML (ref 4.78–24.2)
GLOBULIN UR ELPH-MCNC: 2.5 GM/DL
GLUCOSE SERPL-MCNC: 104 MG/DL (ref 65–99)
HCT VFR BLD AUTO: 42.7 % (ref 34–46.6)
HGB BLD-MCNC: 14 G/DL (ref 12–15.9)
IMM GRANULOCYTES # BLD AUTO: 0.02 10*3/MM3 (ref 0–0.05)
IMM GRANULOCYTES NFR BLD AUTO: 0.3 % (ref 0–0.5)
LYMPHOCYTES # BLD AUTO: 1.49 10*3/MM3 (ref 0.7–3.1)
LYMPHOCYTES NFR BLD AUTO: 23.9 % (ref 19.6–45.3)
MCH RBC QN AUTO: 29.2 PG (ref 26.6–33)
MCHC RBC AUTO-ENTMCNC: 32.8 G/DL (ref 31.5–35.7)
MCV RBC AUTO: 89 FL (ref 79–97)
MONOCYTES # BLD AUTO: 0.54 10*3/MM3 (ref 0.1–0.9)
MONOCYTES NFR BLD AUTO: 8.7 % (ref 5–12)
NEUTROPHILS NFR BLD AUTO: 4.12 10*3/MM3 (ref 1.7–7)
NEUTROPHILS NFR BLD AUTO: 66.2 % (ref 42.7–76)
NRBC BLD AUTO-RTO: 0 /100 WBC (ref 0–0.2)
PLATELET # BLD AUTO: 201 10*3/MM3 (ref 140–450)
PMV BLD AUTO: 12.7 FL (ref 6–12)
POTASSIUM SERPL-SCNC: 4.4 MMOL/L (ref 3.5–5.2)
PROT SERPL-MCNC: 6.4 G/DL (ref 6–8.5)
RBC # BLD AUTO: 4.8 10*6/MM3 (ref 3.77–5.28)
SODIUM SERPL-SCNC: 144 MMOL/L (ref 136–145)
TSH SERPL DL<=0.05 MIU/L-ACNC: 2.5 UIU/ML (ref 0.27–4.2)
VIT B12 BLD-MCNC: 454 PG/ML (ref 211–946)
WBC NRBC COR # BLD: 6.23 10*3/MM3 (ref 3.4–10.8)

## 2022-09-12 ENCOUNTER — TELEPHONE (OUTPATIENT)
Dept: INTERNAL MEDICINE | Facility: CLINIC | Age: 82
End: 2022-09-12

## 2022-09-12 NOTE — TELEPHONE ENCOUNTER
----- Message from Jorgito Muñoz PA-C sent at 9/10/2022 10:30 AM EDT -----  [facility]

## 2022-09-12 NOTE — TELEPHONE ENCOUNTER
Spoke with patient about her labs being stable. Patient verbalized understanding. Patient said she viewed the on mychart. Patient had no questions or concerns.

## 2022-09-23 ENCOUNTER — OFFICE VISIT (OUTPATIENT)
Dept: INTERNAL MEDICINE | Facility: CLINIC | Age: 82
End: 2022-09-23

## 2022-09-23 ENCOUNTER — LAB (OUTPATIENT)
Dept: LAB | Facility: HOSPITAL | Age: 82
End: 2022-09-23

## 2022-09-23 VITALS
HEIGHT: 65 IN | DIASTOLIC BLOOD PRESSURE: 70 MMHG | WEIGHT: 151 LBS | SYSTOLIC BLOOD PRESSURE: 124 MMHG | OXYGEN SATURATION: 95 % | TEMPERATURE: 98 F | BODY MASS INDEX: 25.16 KG/M2 | HEART RATE: 71 BPM

## 2022-09-23 DIAGNOSIS — Z00.00 MEDICARE ANNUAL WELLNESS VISIT, SUBSEQUENT: Primary | ICD-10-CM

## 2022-09-23 DIAGNOSIS — Z13.220 LIPID SCREENING: ICD-10-CM

## 2022-09-23 PROCEDURE — 1160F RVW MEDS BY RX/DR IN RCRD: CPT | Performed by: INTERNAL MEDICINE

## 2022-09-23 PROCEDURE — 99397 PER PM REEVAL EST PAT 65+ YR: CPT | Performed by: INTERNAL MEDICINE

## 2022-09-23 PROCEDURE — G0439 PPPS, SUBSEQ VISIT: HCPCS | Performed by: INTERNAL MEDICINE

## 2022-09-23 PROCEDURE — 1170F FXNL STATUS ASSESSED: CPT | Performed by: INTERNAL MEDICINE

## 2022-09-23 PROCEDURE — 96160 PT-FOCUSED HLTH RISK ASSMT: CPT | Performed by: INTERNAL MEDICINE

## 2022-09-23 PROCEDURE — 1125F AMNT PAIN NOTED PAIN PRSNT: CPT | Performed by: INTERNAL MEDICINE

## 2022-09-23 NOTE — PROGRESS NOTES
The ABCs of the Annual Wellness Visit  Subsequent Medicare Wellness Visit    Chief Complaint   Patient presents with   • Annual Exam     MCW      Subjective    History of Present Illness:  Joanne Tineo is a 82 y.o. female who presents for a Subsequent Medicare Wellness Visit.    The following portions of the patient's history were reviewed and   updated as appropriate: allergies, current medications, past medical history, past social history, past surgical history and problem list.     Compared to one year ago, the patient feels her physical   health is the same.    Compared to one year ago, the patient feels her mental   health is the same.    Recent Hospitalizations:  She was not admitted to the hospital during the last year.       Current Medical Providers:  Patient Care Team:  Lynda Landrum MD as PCP - General (Internal Medicine)  Yang Cota MD as PCP - Family Medicine    Outpatient Medications Prior to Visit   Medication Sig Dispense Refill   • atorvastatin (LIPITOR) 80 MG tablet TAKE ONE TABLET BY MOUTH DAILY 90 tablet 3   • calcium-vitamin D (OSCAL-500) 500-200 MG-UNIT per tablet Take 1 tablet by mouth daily.     • Cholecalciferol (VITAMIN D3) 2000 UNITS tablet Take 1 tablet by mouth Daily.     • clonazePAM (KlonoPIN) 1 MG tablet Take 1 mg by mouth 2 (Two) Times a Day As Needed.     • escitalopram (LEXAPRO) 20 MG tablet Take 1 tablet by mouth Daily. 90 tablet 1   • furosemide (LASIX) 20 MG tablet Take 1 tablet by mouth Daily As Needed (edema). 30 tablet 0   • Multiple Vitamins-Minerals (PRESERVISION AREDS 2) capsule Take 1 capsule by mouth daily.     • Omega-3 Fatty Acids (FISH OIL) 1000 MG capsule capsule Take 1 capsule by mouth daily.     • psyllium (METAMUCIL) 0.52 G capsule Take 3 capsules by mouth daily.     • Shingrix 50 MCG/0.5ML reconstituted suspension        No facility-administered medications prior to visit.       No opioid medication identified on active medication  "list. I have reviewed chart for other potential  high risk medication/s and harmful drug interactions in the elderly.          Aspirin is not on active medication list.  Aspirin use is not indicated based on review of current medical condition/s. Risk of harm outweighs potential benefits.  .    Patient Active Problem List   Diagnosis   • Depression   • Hearing impairment   • Hyperlipidemia   • Irritable bowel syndrome   • Left anterior fascicular block   • Macular degeneration   • Dyssomnia   • Vitamin B deficiency   • Hyperglycemia   • Family history of heart disease   • Vitamin D deficiency   • Anxiety     Advance Care Planning   Advance Directive is not on file.  ACP discussion was declined by the patient. Patient has an advance directive (not in EMR), copy requested.    Review of Systems   Constitutional: Negative.    HENT: Negative.    Eyes: Negative.    Respiratory: Negative.    Cardiovascular: Negative.    Gastrointestinal: Negative.    Endocrine: Negative.    Genitourinary: Negative.    Musculoskeletal: Negative.    Skin: Negative.    Allergic/Immunologic: Negative.    Neurological: Negative.    Hematological: Negative.    Psychiatric/Behavioral: Negative.          Objective       Vitals:    09/23/22 1000   Height: 165.1 cm (65\")   PainSc:   2   PainLoc: Foot     BMI Readings from Last 1 Encounters:   09/23/22 24.93 kg/m²   BMI is within normal parameters. No follow-up required.    Does the patient have evidence of cognitive impairment? No    Physical Exam  Vitals reviewed.   Constitutional:       General: She is not in acute distress.     Appearance: She is well-developed.   HENT:      Head: Normocephalic and atraumatic.      Right Ear: Tympanic membrane, ear canal and external ear normal.      Left Ear: Tympanic membrane, ear canal and external ear normal.      Nose: Nose normal.      Mouth/Throat:      Lips: Pink.      Mouth: Mucous membranes are moist.      Tongue: No lesions.      Pharynx: Oropharynx is " clear.   Eyes:      General: Lids are normal. Vision grossly intact. No scleral icterus.     Extraocular Movements: Extraocular movements intact.      Conjunctiva/sclera: Conjunctivae normal.      Pupils: Pupils are equal, round, and reactive to light.   Neck:      Thyroid: No thyroid mass or thyromegaly.      Vascular: No carotid bruit.   Cardiovascular:      Rate and Rhythm: Normal rate and regular rhythm.      Pulses:           Dorsalis pedis pulses are 2+ on the right side and 2+ on the left side.        Posterior tibial pulses are 2+ on the right side and 2+ on the left side.      Heart sounds: Normal heart sounds. No murmur heard.    No friction rub. No gallop. No S3 or S4 sounds.   Pulmonary:      Effort: Pulmonary effort is normal. No respiratory distress.      Breath sounds: Normal breath sounds. No wheezing, rhonchi or rales.   Abdominal:      General: Bowel sounds are normal. There is no distension.      Palpations: Abdomen is soft. There is no mass.      Tenderness: There is no abdominal tenderness.   Musculoskeletal:         General: Normal range of motion.      Cervical back: Neck supple.      Right lower leg: No edema.      Left lower leg: No edema.   Lymphadenopathy:      Cervical: No cervical adenopathy.   Skin:     General: Skin is warm and dry.      Coloration: Skin is not pale.      Findings: No erythema or rash.   Neurological:      Mental Status: She is alert and oriented to person, place, and time.      Cranial Nerves: No cranial nerve deficit.      Sensory: No sensory deficit.      Gait: Gait is intact.   Psychiatric:         Attention and Perception: Attention normal.         Mood and Affect: Mood normal.         Speech: Speech normal.         Behavior: Behavior normal.         Thought Content: Thought content normal.         Judgment: Judgment normal.                 HEALTH RISK ASSESSMENT    Smoking Status:  Social History     Tobacco Use   Smoking Status Never Smoker   Smokeless Tobacco  Never Used     Alcohol Consumption:  Social History     Substance and Sexual Activity   Alcohol Use Yes   • Alcohol/week: 2.0 standard drinks   • Types: 2 Glasses of wine per week     Fall Risk Screen:    BURKE Fall Risk Assessment has not been completed.    Depression Screening:  PHQ-2/PHQ-9 Depression Screening 9/23/2022   Retired PHQ-9 Total Score -   Retired Total Score -   Little Interest or Pleasure in Doing Things 0-->not at all   Feeling Down, Depressed or Hopeless 0-->not at all   PHQ-9: Brief Depression Severity Measure Score 0       Health Habits and Functional and Cognitive Screening:  Functional & Cognitive Status 9/23/2022   Do you have difficulty preparing food and eating? No   Do you have difficulty bathing yourself, getting dressed or grooming yourself? No   Do you have difficulty using the toilet? No   Do you have difficulty moving around from place to place? No   Do you have trouble with steps or getting out of a bed or a chair? No   Current Diet Well Balanced Diet   Dental Exam Up to date   Eye Exam Up to date   Exercise (times per week) 4 times per week   Current Exercises Include Walking   Current Exercise Activities Include -   Do you need help using the phone?  No   Are you deaf or do you have serious difficulty hearing?  No   Do you need help with transportation? No   Do you need help shopping? No   Do you need help preparing meals?  No   Do you need help with housework?  No   Do you need help with laundry? No   Do you need help taking your medications? No   Do you need help managing money? No   Do you ever drive or ride in a car without wearing a seat belt? No   Have you felt unusual stress, anger or loneliness in the last month? Yes   Who do you live with? Alone   If you need help, do you have trouble finding someone available to you? No   Have you been bothered in the last four weeks by sexual problems? No   Do you have difficulty concentrating, remembering or making decisions? No        Age-appropriate Screening Schedule:  Refer to the list below for future screening recommendations based on patient's age, sex and/or medical conditions. Orders for these recommended tests are listed in the plan section. The patient has been provided with a written plan.    Health Maintenance   Topic Date Due   • LIPID PANEL  09/21/2022   • INFLUENZA VACCINE  10/01/2022   • ZOSTER VACCINE (3 of 3) 10/07/2022   • DXA SCAN  04/08/2023   • MAMMOGRAM  04/23/2023   • TDAP/TD VACCINES (4 - Td or Tdap) 08/27/2029              Assessment & Plan     CMS Preventative Services Quick Reference  Risk Factors Identified During Encounter  Immunizations Discussed/Encouraged (specific Immunizations; Tdap, Influenza, Prevnar 20 (Pneumococcal 20-valent conjugate), Shingrix and COVID19  The above risks/problems have been discussed with the patient.  Follow up actions/plans if indicated are seen below in the Assessment/Plan Section.  Pertinent information has been shared with the patient in the After Visit Summary.    Diagnoses and all orders for this visit:    1. Medicare annual wellness visit, subsequent (Primary)  Reviewed the following with the patient: advised patient of need for:  mammogram, colonoscopy and immunizations discussed and encouraged patient to exercise 5-7 days per week for 30 minutes at a time.  -rec PCV 20 at pharmacy    2. Lipid screening  -     Lipid Panel        Follow Up:   No follow-ups on file.     An After Visit Summary and PPPS were given to the patient.

## 2022-09-24 LAB
CHOLEST SERPL-MCNC: 126 MG/DL (ref 0–200)
HDLC SERPL-MCNC: 44 MG/DL (ref 40–60)
LDLC SERPL CALC-MCNC: 59 MG/DL (ref 0–100)
TRIGL SERPL-MCNC: 132 MG/DL (ref 0–150)
VLDLC SERPL CALC-MCNC: 23 MG/DL (ref 5–40)

## 2022-12-12 ENCOUNTER — LAB (OUTPATIENT)
Dept: LAB | Facility: HOSPITAL | Age: 82
End: 2022-12-12

## 2022-12-12 ENCOUNTER — HOSPITAL ENCOUNTER (OUTPATIENT)
Dept: CT IMAGING | Facility: HOSPITAL | Age: 82
Discharge: HOME | End: 2022-12-12

## 2022-12-12 ENCOUNTER — OFFICE VISIT (OUTPATIENT)
Dept: INTERNAL MEDICINE | Facility: CLINIC | Age: 82
End: 2022-12-12

## 2022-12-12 VITALS
TEMPERATURE: 97.3 F | BODY MASS INDEX: 24.32 KG/M2 | WEIGHT: 146 LBS | SYSTOLIC BLOOD PRESSURE: 124 MMHG | OXYGEN SATURATION: 99 % | HEART RATE: 90 BPM | HEIGHT: 65 IN | DIASTOLIC BLOOD PRESSURE: 76 MMHG

## 2022-12-12 DIAGNOSIS — R93.0 ABNORMAL CT OF THE HEAD: ICD-10-CM

## 2022-12-12 DIAGNOSIS — R26.81 UNSTEADY GAIT: Primary | ICD-10-CM

## 2022-12-12 DIAGNOSIS — R26.81 UNSTEADY GAIT: ICD-10-CM

## 2022-12-12 LAB
ALBUMIN SERPL-MCNC: 4 G/DL (ref 3.5–5.2)
ALBUMIN/GLOB SERPL: 1.3 G/DL
ALP SERPL-CCNC: 131 U/L (ref 39–117)
ALT SERPL W P-5'-P-CCNC: 30 U/L (ref 1–33)
ANION GAP SERPL CALCULATED.3IONS-SCNC: 9 MMOL/L (ref 5–15)
AST SERPL-CCNC: 27 U/L (ref 1–32)
BASOPHILS # BLD AUTO: 0.01 10*3/MM3 (ref 0–0.2)
BASOPHILS NFR BLD AUTO: 0.1 % (ref 0–1.5)
BILIRUB SERPL-MCNC: 0.6 MG/DL (ref 0–1.2)
BUN SERPL-MCNC: 13 MG/DL (ref 8–23)
BUN/CREAT SERPL: 20.3 (ref 7–25)
CALCIUM SPEC-SCNC: 8.9 MG/DL (ref 8.6–10.5)
CHLORIDE SERPL-SCNC: 106 MMOL/L (ref 98–107)
CO2 SERPL-SCNC: 28 MMOL/L (ref 22–29)
CREAT SERPL-MCNC: 0.64 MG/DL (ref 0.57–1)
D-LACTATE SERPL-SCNC: 1 MMOL/L (ref 0.5–2)
DEPRECATED RDW RBC AUTO: 44.2 FL (ref 37–54)
EGFRCR SERPLBLD CKD-EPI 2021: 88.9 ML/MIN/1.73
EOSINOPHIL # BLD AUTO: 0.03 10*3/MM3 (ref 0–0.4)
EOSINOPHIL NFR BLD AUTO: 0.4 % (ref 0.3–6.2)
ERYTHROCYTE [DISTWIDTH] IN BLOOD BY AUTOMATED COUNT: 13.6 % (ref 12.3–15.4)
GLOBULIN UR ELPH-MCNC: 3.1 GM/DL
GLUCOSE SERPL-MCNC: 125 MG/DL (ref 65–99)
HCT VFR BLD AUTO: 45.5 % (ref 34–46.6)
HGB BLD-MCNC: 14.6 G/DL (ref 12–15.9)
IMM GRANULOCYTES # BLD AUTO: 0.03 10*3/MM3 (ref 0–0.05)
IMM GRANULOCYTES NFR BLD AUTO: 0.4 % (ref 0–0.5)
LYMPHOCYTES # BLD AUTO: 1.35 10*3/MM3 (ref 0.7–3.1)
LYMPHOCYTES NFR BLD AUTO: 17.4 % (ref 19.6–45.3)
MAGNESIUM SERPL-MCNC: 2 MG/DL (ref 1.6–2.4)
MCH RBC QN AUTO: 28.6 PG (ref 26.6–33)
MCHC RBC AUTO-ENTMCNC: 32.1 G/DL (ref 31.5–35.7)
MCV RBC AUTO: 89 FL (ref 79–97)
MONOCYTES # BLD AUTO: 0.53 10*3/MM3 (ref 0.1–0.9)
MONOCYTES NFR BLD AUTO: 6.8 % (ref 5–12)
NEUTROPHILS NFR BLD AUTO: 5.82 10*3/MM3 (ref 1.7–7)
NEUTROPHILS NFR BLD AUTO: 74.9 % (ref 42.7–76)
NRBC BLD AUTO-RTO: 0 /100 WBC (ref 0–0.2)
PLATELET # BLD AUTO: 225 10*3/MM3 (ref 140–450)
PMV BLD AUTO: 12.1 FL (ref 6–12)
POTASSIUM SERPL-SCNC: 4.2 MMOL/L (ref 3.5–5.2)
PROT SERPL-MCNC: 7.1 G/DL (ref 6–8.5)
RBC # BLD AUTO: 5.11 10*6/MM3 (ref 3.77–5.28)
SODIUM SERPL-SCNC: 143 MMOL/L (ref 136–145)
WBC NRBC COR # BLD: 7.77 10*3/MM3 (ref 3.4–10.8)

## 2022-12-12 PROCEDURE — 99214 OFFICE O/P EST MOD 30 MIN: CPT | Performed by: NURSE PRACTITIONER

## 2022-12-12 PROCEDURE — 70450 CT HEAD/BRAIN W/O DYE: CPT

## 2022-12-12 PROCEDURE — 80053 COMPREHEN METABOLIC PANEL: CPT | Performed by: NURSE PRACTITIONER

## 2022-12-12 PROCEDURE — 36415 COLL VENOUS BLD VENIPUNCTURE: CPT | Performed by: NURSE PRACTITIONER

## 2022-12-12 PROCEDURE — 83735 ASSAY OF MAGNESIUM: CPT

## 2022-12-12 PROCEDURE — 83605 ASSAY OF LACTIC ACID: CPT

## 2022-12-12 PROCEDURE — 85025 COMPLETE CBC W/AUTO DIFF WBC: CPT

## 2022-12-12 NOTE — PROGRESS NOTES
"Chief Complaint   Patient presents with   • Gait Problem       HPI  Joanne Tineo is a 82 y.o. female presents with an unsteady gait.  This started yesterday when she woke up.  She states that her legs felt like jelly yesterday.  It has improved some but still feels unsteady.  No headaches, CP, or SOB.  Had a cold about a week ago.  No other complaints.  She states that she had vertigo about 20 years ago and this does not feel like vertigo.  No history of a stroke.      The following portions of the patient's history were reviewed and updated as appropriate: allergies, current medications, past family history, past medical history, past social history, past surgical history and problem list.    Subjective  Review of Systems   Constitutional: Negative for activity change, appetite change and fatigue.   HENT: Negative for congestion.    Respiratory: Negative for cough and shortness of breath.    Cardiovascular: Negative for chest pain and leg swelling.   Gastrointestinal: Negative for abdominal pain.   Neurological: Positive for weakness. Negative for dizziness, syncope, facial asymmetry, speech difficulty, light-headedness, headache and confusion.   Psychiatric/Behavioral: Negative for behavioral problems and decreased concentration.       Objective  Visit Vitals  /76 (BP Location: Left arm, Patient Position: Sitting)   Pulse 90   Temp 97.3 °F (36.3 °C)   Ht 165.1 cm (65\")   Wt 66.2 kg (146 lb)   SpO2 99%   BMI 24.30 kg/m²        Physical Exam  Vitals and nursing note reviewed.   HENT:      Head: Normocephalic.   Eyes:      Pupils: Pupils are equal, round, and reactive to light.   Neck:      Vascular: No carotid bruit.   Cardiovascular:      Rate and Rhythm: Normal rate and regular rhythm.      Pulses: Normal pulses.      Heart sounds: Normal heart sounds.   Pulmonary:      Effort: Pulmonary effort is normal.      Breath sounds: Normal breath sounds.   Skin:     General: Skin is warm and dry.      " Capillary Refill: Capillary refill takes less than 2 seconds.   Neurological:      General: No focal deficit present.      Mental Status: She is alert and oriented to person, place, and time.      Cranial Nerves: Cranial nerves 2-12 are intact.      Gait: Gait abnormal and tandem walk abnormal.      Comments: Equal  and muscle strength   Psychiatric:         Attention and Perception: Attention normal.         Mood and Affect: Mood normal.         Behavior: Behavior normal.          Procedures     Assessment and Plan  Diagnoses and all orders for this visit:    1. Unsteady gait (Primary)  -     CT Head Without Contrast; Future  -     CBC w AUTO Differential; Future  -     Comprehensive Metabolic Panel  -     Lactic Acid, Plasma; Future  -     Magnesium; Future  -     MRI Brain With & Without Contrast; Future    2. Abnormal CT of the head  -     MRI Brain With & Without Contrast; Future    Pt denies dizziness or vertigo  No headache  Speech normal  STAT CT head ordered - to get done at 1:45 today  Labs today    _____________  1430 - Pt contacted with CT results via phone, possible lacunar type stroke, STAT MRI ordered    Return for Next scheduled follow up.        Jake Magdaleno APRN

## 2022-12-13 ENCOUNTER — HOSPITAL ENCOUNTER (OUTPATIENT)
Dept: MRI IMAGING | Facility: HOSPITAL | Age: 82
Discharge: HOME | End: 2022-12-13
Admitting: NURSE PRACTITIONER

## 2022-12-13 DIAGNOSIS — R26.81 UNSTEADY GAIT: Primary | ICD-10-CM

## 2022-12-13 DIAGNOSIS — R93.0 ABNORMAL CT OF THE HEAD: ICD-10-CM

## 2022-12-13 DIAGNOSIS — R26.81 UNSTEADY GAIT: ICD-10-CM

## 2022-12-13 PROCEDURE — 70553 MRI BRAIN STEM W/O & W/DYE: CPT

## 2022-12-13 PROCEDURE — 0 GADOBENATE DIMEGLUMINE 529 MG/ML SOLUTION: Performed by: NURSE PRACTITIONER

## 2022-12-13 PROCEDURE — A9577 INJ MULTIHANCE: HCPCS | Performed by: NURSE PRACTITIONER

## 2022-12-13 RX ADMIN — GADOBENATE DIMEGLUMINE 13 ML: 529 INJECTION, SOLUTION INTRAVENOUS at 09:22

## 2022-12-14 ENCOUNTER — OFFICE VISIT (OUTPATIENT)
Dept: NEUROLOGY | Facility: CLINIC | Age: 82
End: 2022-12-14

## 2022-12-14 VITALS
BODY MASS INDEX: 24.32 KG/M2 | WEIGHT: 146 LBS | HEIGHT: 65 IN | OXYGEN SATURATION: 93 % | HEART RATE: 98 BPM | DIASTOLIC BLOOD PRESSURE: 82 MMHG | SYSTOLIC BLOOD PRESSURE: 132 MMHG

## 2022-12-14 DIAGNOSIS — R26.81 UNSTEADINESS: Primary | ICD-10-CM

## 2022-12-14 PROCEDURE — 99205 OFFICE O/P NEW HI 60 MIN: CPT | Performed by: PHYSICIAN ASSISTANT

## 2022-12-15 NOTE — PROGRESS NOTES
"Subjective       Chief Complaint: unsteady gait     History of Present Illness   Joanne Tineo is a 82 y.o. female who comes to clinic today for evaluation of unsteadiness. She initially noted symptoms upon awakening on 12/11 marked by a sensation of unsteadiness while walking. She notes that her legs felt like \"jelly\". This has gradually improved over the last few days, and she now feels that she is nearly back to her previous baseline. She denies any modifying factors as well as any additional associated neurologic symptoms, including dizziness, focal weakness, dysesthesias, headache, changes in vision or slurred speech. She has not fallen. She notes that she had an upper respiratory infection several days prior to the onset of the unsteadiness. She also notes a remote history of vertigo in approximately 2002.     Prior evaluation has included an MRI of the brain showed mild generalized volume loss and chronic small vessel ischemic changes, but was otherwise unremarkable.      I have reviewed and confirmed the past family, social and medical history as accurate on 12/15/22.     Review of Systems   Constitutional: Negative.    HENT: Negative.    Eyes: Negative.    Respiratory: Negative.    Cardiovascular: Negative.    Gastrointestinal: Negative.    Endocrine: Negative.    Genitourinary: Negative.    Musculoskeletal: Negative.    Skin: Negative.    Allergic/Immunologic: Negative.    Hematological: Negative.        Objective     /82   Pulse 98   Ht 165.1 cm (65\")   Wt 66.2 kg (146 lb)   SpO2 93%   BMI 24.30 kg/m²     General appearance today is normal.       Physical Exam  Neurological:      Cranial Nerves: Cranial nerves 2-12 are intact.      Motor: Motor strength is normal.      Coordination: Finger-Nose-Finger Test, Heel to Shin Test and Romberg Test normal.      Gait: Gait is intact.      Deep Tendon Reflexes:      Reflex Scores:       Patellar reflexes are 2+ on the right side and 2+ on the left " side.  Psychiatric:         Speech: Speech normal.          Neurologic Exam     Mental Status   Speech: speech is normal   Level of consciousness: alert  Normal comprehension.     Cranial Nerves   Cranial nerves II through XII intact.     Motor Exam   Muscle bulk: normal  Overall muscle tone: normal    Strength   Strength 5/5 throughout.     Sensory Exam   Light touch normal.     Gait, Coordination, and Reflexes     Gait  Gait: normal    Coordination   Romberg: negative  Finger to nose coordination: normal  Heel to shin coordination: normal    Tremor   Resting tremor: absent    Reflexes   Right patellar: 2+  Left patellar: 2+Rises easily from chair         Assessment & Plan   Diagnoses and all orders for this visit:    1. Unsteadiness (Primary)          Discussion/Summary   Joanne Tineo comes to clinic today for evaluation of an episode of unsteadiness. The etiology is somewhat unclear, though dehydration and her recent URI are likely contributing factors. This was discussed in detail. Given her recent unremarkable lab work and MRI as well as her improving symptoms, it was not elected to obtain further workup. I discussed the importance of adequate daily water intake. If her symptoms do not improve, we will next consider a referral to PT for a vestibular evaluation. She will then follow up in our clinic on an as needed basis.  Total time of visit today: 60 minutes. As part of this visit I reviewed prior lab results, reviewed radiology results and reviewed radiology images. I also discussed diagnosis, prognosis, diagnostic testing, evaluation, current status, treatment options and management as discussed above.             Mariely Caraballo PA-C

## 2023-01-17 ENCOUNTER — TELEPHONE (OUTPATIENT)
Dept: INTERNAL MEDICINE | Facility: CLINIC | Age: 83
End: 2023-01-17
Payer: MEDICARE

## 2023-01-17 RX ORDER — CLONAZEPAM 1 MG/1
1 TABLET ORAL 2 TIMES DAILY PRN
Status: CANCELLED | OUTPATIENT
Start: 2023-01-17

## 2023-01-17 RX ORDER — CLONAZEPAM 1 MG/1
1 TABLET ORAL 2 TIMES DAILY PRN
Qty: 10 TABLET | Refills: 0 | Status: SHIPPED | OUTPATIENT
Start: 2023-01-17

## 2023-01-17 NOTE — TELEPHONE ENCOUNTER
Rx Refill Note  Requested Prescriptions     Pending Prescriptions Disp Refills   • clonazePAM (KlonoPIN) 1 MG tablet       Sig: Take 1 tablet by mouth 2 (Two) Times a Day As Needed.      Last office visit with prescribing clinician: 9/23/2022   Last telemedicine visit with prescribing clinician: 1/24/2023   Next office visit with prescribing clinician: 1/24/2023                           Marycruz James MA  01/17/23, 10:17 EST

## 2023-01-17 NOTE — TELEPHONE ENCOUNTER
Caller: Joanne Tineo    Relationship: Self    Best call back number: [phone]    Requested Prescriptions:   Requested Prescriptions     Pending Prescriptions Disp Refills   • clonazePAM (KlonoPIN) 1 MG tablet       Sig: Take 1 tablet by mouth 2 (Two) Times a Day As Needed.        Pharmacy where request should be sent: [facility] [id number] [address]  AT [facility] [address] - [phone]  - [phone]     Additional details provided by patient:     Does the patient have less than a 3 day supply:  [x] Yes  [] No      Daryn Abbott, PCT   01/17/23 10:10 EST

## 2023-01-24 ENCOUNTER — OFFICE VISIT (OUTPATIENT)
Dept: INTERNAL MEDICINE | Facility: CLINIC | Age: 83
End: 2023-01-24
Payer: MEDICARE

## 2023-01-24 ENCOUNTER — LAB (OUTPATIENT)
Dept: LAB | Facility: HOSPITAL | Age: 83
End: 2023-01-24
Payer: MEDICARE

## 2023-01-24 VITALS
OXYGEN SATURATION: 98 % | HEART RATE: 86 BPM | WEIGHT: 144 LBS | SYSTOLIC BLOOD PRESSURE: 170 MMHG | HEIGHT: 65 IN | BODY MASS INDEX: 23.99 KG/M2 | DIASTOLIC BLOOD PRESSURE: 110 MMHG

## 2023-01-24 DIAGNOSIS — M54.42 ACUTE LEFT-SIDED LOW BACK PAIN WITH LEFT-SIDED SCIATICA: Primary | ICD-10-CM

## 2023-01-24 DIAGNOSIS — I10 ELEVATED BLOOD PRESSURE READING IN OFFICE WITH DIAGNOSIS OF HYPERTENSION: ICD-10-CM

## 2023-01-24 DIAGNOSIS — D72.829 LEUKOCYTOSIS, UNSPECIFIED TYPE: ICD-10-CM

## 2023-01-24 PROCEDURE — 99214 OFFICE O/P EST MOD 30 MIN: CPT | Performed by: INTERNAL MEDICINE

## 2023-01-24 PROCEDURE — 84443 ASSAY THYROID STIM HORMONE: CPT | Performed by: INTERNAL MEDICINE

## 2023-01-24 PROCEDURE — 80053 COMPREHEN METABOLIC PANEL: CPT | Performed by: INTERNAL MEDICINE

## 2023-01-24 PROCEDURE — 85027 COMPLETE CBC AUTOMATED: CPT | Performed by: INTERNAL MEDICINE

## 2023-01-24 RX ORDER — LISINOPRIL 10 MG/1
10 TABLET ORAL DAILY
Qty: 30 TABLET | Refills: 2 | Status: SHIPPED | OUTPATIENT
Start: 2023-01-24 | End: 2023-03-23

## 2023-01-24 NOTE — PROGRESS NOTES
"Subjective   Joanne Tineo is a 82 y.o. female here for follow-up recent episode of back pain and she is very concerned about her blood pressure.  She has never had any diagnosis of hypertension though her blood pressure is high both today and when she went to urgent care for her back recently.  She says she has been stressed about what is going on with her health.  She has not been taking any new medications or supplements.  She feels a bit shaky.  She is on day 3 or 4 of a Medrol Dosepak.  She went to urgent care recently for low back pain.  She was given a Medrol Dosepak which has helped though she is still feeling pain and cramping on the left lower back.  It does radiate into the left leg.  No red flag symptoms.  She is interested in going to physical therapy.    I have reviewed the following portions of the patient's history and confirmed they are accurate: current medications, past medical history, past social history and problem list     I have personally reviewed and performed the ROS. Lynda Landrum MD     Review of Systems:  General: negative  CV: negative  Respiratory: negative  Neuro: \" Feels jittery\"  MSK: Back pain    Objective   BP (!) 170/110 (BP Location: Left arm, Patient Position: Sitting)   Pulse 86   Ht 165.1 cm (65\")   Wt 65.3 kg (144 lb)   SpO2 98%   BMI 23.96 kg/m²     Physical Exam  Vitals reviewed.   Constitutional:       Appearance: She is well-developed.   Pulmonary:      Effort: Pulmonary effort is normal.   Skin:     General: Skin is warm and dry.   Neurological:      General: No focal deficit present.      Mental Status: She is alert and oriented to person, place, and time.      Comments: No obvious tremor   Psychiatric:         Behavior: Behavior normal.         Thought Content: Thought content normal.         Judgment: Judgment normal.         Assessment & Plan   Diagnoses and all orders for this visit:    1. Acute left-sided low back pain with left-sided " sciatica (Primary)  -     Ambulatory Referral to Physical Therapy Evaluate and treat  -     XR Spine Lumbar 2 or 3 View; Future  -Continue on Medrol Dosepak.  Discussed common side effects of steroids.    2. Elevated blood pressure reading in office with diagnosis of hypertension  -     CBC (No Diff)  -     Comprehensive Metabolic Panel  -     TSH  -     lisinopril (PRINIVIL,ZESTRIL) 10 MG tablet; Take 1 tablet by mouth Daily.  Dispense: 30 tablet; Refill: 2  -no dx of HTN until today, very high BP x2.  Advised patient to get a blood pressure cuff and start taking blood pressure readings at home and report by Friday if they remain this elevated, otherwise I would like her to call on Monday with some blood pressure readings.  Discussed symptoms of heart attack and stroke and to call 911 or present to ER immediately if she develops any of the symptoms    Reviewed urgent care note         Lynda Landrum MD

## 2023-01-25 LAB
ALBUMIN SERPL-MCNC: 3.9 G/DL (ref 3.5–5.2)
ALBUMIN/GLOB SERPL: 1.2 G/DL
ALP SERPL-CCNC: 115 U/L (ref 39–117)
ALT SERPL W P-5'-P-CCNC: 33 U/L (ref 1–33)
ANION GAP SERPL CALCULATED.3IONS-SCNC: 11.4 MMOL/L (ref 5–15)
AST SERPL-CCNC: 23 U/L (ref 1–32)
BILIRUB SERPL-MCNC: 0.4 MG/DL (ref 0–1.2)
BUN SERPL-MCNC: 20 MG/DL (ref 8–23)
BUN/CREAT SERPL: 28.2 (ref 7–25)
CALCIUM SPEC-SCNC: 9.5 MG/DL (ref 8.6–10.5)
CHLORIDE SERPL-SCNC: 101 MMOL/L (ref 98–107)
CO2 SERPL-SCNC: 27.6 MMOL/L (ref 22–29)
CREAT SERPL-MCNC: 0.71 MG/DL (ref 0.57–1)
DEPRECATED RDW RBC AUTO: 40.1 FL (ref 37–54)
EGFRCR SERPLBLD CKD-EPI 2021: 85.5 ML/MIN/1.73
ERYTHROCYTE [DISTWIDTH] IN BLOOD BY AUTOMATED COUNT: 13.1 % (ref 12.3–15.4)
GLOBULIN UR ELPH-MCNC: 3.3 GM/DL
GLUCOSE SERPL-MCNC: 85 MG/DL (ref 65–99)
HCT VFR BLD AUTO: 44.3 % (ref 34–46.6)
HGB BLD-MCNC: 14.9 G/DL (ref 12–15.9)
MCH RBC QN AUTO: 28.4 PG (ref 26.6–33)
MCHC RBC AUTO-ENTMCNC: 33.6 G/DL (ref 31.5–35.7)
MCV RBC AUTO: 84.5 FL (ref 79–97)
PLATELET # BLD AUTO: 266 10*3/MM3 (ref 140–450)
PMV BLD AUTO: 12.2 FL (ref 6–12)
POTASSIUM SERPL-SCNC: 4.2 MMOL/L (ref 3.5–5.2)
PROT SERPL-MCNC: 7.2 G/DL (ref 6–8.5)
RBC # BLD AUTO: 5.24 10*6/MM3 (ref 3.77–5.28)
SODIUM SERPL-SCNC: 140 MMOL/L (ref 136–145)
TSH SERPL DL<=0.05 MIU/L-ACNC: 2.73 UIU/ML (ref 0.27–4.2)
WBC NRBC COR # BLD: 10.83 10*3/MM3 (ref 3.4–10.8)

## 2023-01-26 ENCOUNTER — TELEPHONE (OUTPATIENT)
Dept: INTERNAL MEDICINE | Facility: CLINIC | Age: 83
End: 2023-01-26
Payer: MEDICARE

## 2023-01-26 NOTE — TELEPHONE ENCOUNTER
Caller: Joanne Tineo    Relationship: Self    Best call back number: [phone]    What medication are you requesting: ANYTHING    What are your current symptoms: BACK PAIN    How long have you been experiencing symptoms: LIFE    Have you had these symptoms before:    [] Yes  [x] No    Have you been treated for these symptoms before:   [] Yes  [x] No    If a prescription is needed, what is your preferred pharmacy and phone number:    [facility]   [place]     Additional notes: PATIENT STATED BACK PAIN STARTED WHEN A BONE BECAME OUT OF PLACE IN HER BACK. PATIENT IS CONCERNED BECAUSE RECENT LAB RESULTS INDICATE A HIGH RISK FOR KIDNEY DISEASE.

## 2023-01-26 NOTE — TELEPHONE ENCOUNTER
No kidney disease. Pt’s see that “chronic kidney disease” criteria on their results and think it’s means them. But hers is normal. I will result the rest to her but her kidney function is normal.

## 2023-01-26 NOTE — TELEPHONE ENCOUNTER
Caller: Joanne Tineo    Relationship: Self    Best call back number:[phone]    What is the best time to reach you: ANYTIME    Who are you requesting to speak with (clinical staff, provider,  specific staff member):DR PEGUERO    What was the call regarding: PATIENTS   BLOOD PRESSURE WAS  142/81 ON 01/26/23 A.M.NO MEDICINE TAKEN YET. PATIENTS TEST RESULTS INDICATED PATIENT MIGHT HAVE KIDNEY DISEASE. PATIENT WOULD LIKE TO KNOW IF THE PROVIDER WOULD LIKE TO SEE HER OR CALL IN A PRESCRIPTION?    Do you require a callback: YES                                            /81 01/26/23 A.M.NO MEDICINE YET, TEST RESULTS INDICATED PATIENT MIGHT HAE KIDNEY DISEASE. PATIENT WOULD LIKE TO KNOW IF THE PROVIDER WOULD LIKE TO SEE HER OR CALL IN A PRESCRIPTION?

## 2023-02-01 ENCOUNTER — HOSPITAL ENCOUNTER (OUTPATIENT)
Dept: GENERAL RADIOLOGY | Facility: HOSPITAL | Age: 83
Discharge: HOME | End: 2023-02-01
Payer: MEDICARE

## 2023-02-01 ENCOUNTER — LAB (OUTPATIENT)
Dept: LAB | Facility: HOSPITAL | Age: 83
End: 2023-02-01
Payer: MEDICARE

## 2023-02-01 DIAGNOSIS — M54.42 ACUTE LEFT-SIDED LOW BACK PAIN WITH LEFT-SIDED SCIATICA: ICD-10-CM

## 2023-02-01 LAB
BASOPHILS # BLD AUTO: 0.02 10*3/MM3 (ref 0–0.2)
BASOPHILS NFR BLD AUTO: 0.3 % (ref 0–1.5)
DEPRECATED RDW RBC AUTO: 41.1 FL (ref 37–54)
EOSINOPHIL # BLD AUTO: 0.05 10*3/MM3 (ref 0–0.4)
EOSINOPHIL NFR BLD AUTO: 0.7 % (ref 0.3–6.2)
ERYTHROCYTE [DISTWIDTH] IN BLOOD BY AUTOMATED COUNT: 13.3 % (ref 12.3–15.4)
HCT VFR BLD AUTO: 43 % (ref 34–46.6)
HGB BLD-MCNC: 14.6 G/DL (ref 12–15.9)
IMM GRANULOCYTES # BLD AUTO: 0.02 10*3/MM3 (ref 0–0.05)
IMM GRANULOCYTES NFR BLD AUTO: 0.3 % (ref 0–0.5)
LYMPHOCYTES # BLD AUTO: 2.2 10*3/MM3 (ref 0.7–3.1)
LYMPHOCYTES NFR BLD AUTO: 29 % (ref 19.6–45.3)
MCH RBC QN AUTO: 28.4 PG (ref 26.6–33)
MCHC RBC AUTO-ENTMCNC: 34 G/DL (ref 31.5–35.7)
MCV RBC AUTO: 83.7 FL (ref 79–97)
MONOCYTES # BLD AUTO: 0.74 10*3/MM3 (ref 0.1–0.9)
MONOCYTES NFR BLD AUTO: 9.7 % (ref 5–12)
NEUTROPHILS NFR BLD AUTO: 4.56 10*3/MM3 (ref 1.7–7)
NEUTROPHILS NFR BLD AUTO: 60 % (ref 42.7–76)
NRBC BLD AUTO-RTO: 0 /100 WBC (ref 0–0.2)
PLATELET # BLD AUTO: 243 10*3/MM3 (ref 140–450)
PMV BLD AUTO: 12.4 FL (ref 6–12)
RBC # BLD AUTO: 5.14 10*6/MM3 (ref 3.77–5.28)
WBC NRBC COR # BLD: 7.59 10*3/MM3 (ref 3.4–10.8)

## 2023-02-01 PROCEDURE — 85025 COMPLETE CBC W/AUTO DIFF WBC: CPT | Performed by: INTERNAL MEDICINE

## 2023-02-01 PROCEDURE — 72100 X-RAY EXAM L-S SPINE 2/3 VWS: CPT

## 2023-02-02 ENCOUNTER — TELEPHONE (OUTPATIENT)
Dept: INTERNAL MEDICINE | Facility: CLINIC | Age: 83
End: 2023-02-02
Payer: MEDICARE

## 2023-02-02 ENCOUNTER — TELEPHONE (OUTPATIENT)
Dept: INTERNAL MEDICINE | Facility: CLINIC | Age: 83
End: 2023-02-02

## 2023-02-02 NOTE — TELEPHONE ENCOUNTER
Caller: Joanne Tineo    Relationship: Self    Best call back number: [phone]    What is the best time to reach you: ANYTIME    Who are you requesting to speak with (clinical staff, provider,  specific staff member): MANSI    Do you know the name of the person who called: SELF    What was the call regarding: PATIENT IS RETURNING A CALL TO [place]. PATIENT STATES THAT HER IS FEELING THE SAME AND SHE WOULD LIKE A CALL ABOUT THIS.    Do you require a callback: YES

## 2023-02-03 ENCOUNTER — TELEPHONE (OUTPATIENT)
Dept: INTERNAL MEDICINE | Facility: CLINIC | Age: 83
End: 2023-02-03
Payer: MEDICARE

## 2023-02-03 NOTE — TELEPHONE ENCOUNTER
PT called stating that she had received a call and message from Nerissa. PT states that she understands her test results but would like a call back to discuss next steps for treatment.

## 2023-02-06 ENCOUNTER — TREATMENT (OUTPATIENT)
Dept: PHYSICAL THERAPY | Facility: CLINIC | Age: 83
End: 2023-02-06
Payer: MEDICARE

## 2023-02-06 DIAGNOSIS — R19.8 ABDOMINAL WEAKNESS: ICD-10-CM

## 2023-02-06 DIAGNOSIS — M54.50 CHRONIC BILATERAL LOW BACK PAIN WITHOUT SCIATICA: Primary | ICD-10-CM

## 2023-02-06 DIAGNOSIS — G89.29 CHRONIC BILATERAL LOW BACK PAIN WITHOUT SCIATICA: Primary | ICD-10-CM

## 2023-02-06 PROCEDURE — 97162 PT EVAL MOD COMPLEX 30 MIN: CPT | Performed by: PHYSICAL THERAPIST

## 2023-02-06 PROCEDURE — 97110 THERAPEUTIC EXERCISES: CPT | Performed by: PHYSICAL THERAPIST

## 2023-02-06 NOTE — PROGRESS NOTES
Physical Therapy Initial Evaluation and Plan of Care  [facility]  [address] [facility] [address]  [place] [address]    Patient: Joanne Tineo   : 1940  Diagnosis/ICD-10 Code:  No primary diagnosis found.  Referring practitioner: Lynda Strauss*  Date of Initial Visit: 2023  Today's Date: 2023  Patient seen for Visit count could not be calculated. Make sure you are using a visit which is associated with an episode. session         Visit Diagnoses:  No diagnosis found.      Subjective Questionnaire: Oswestry: 10/50      Subjective Evaluation    History of Present Illness  Mechanism of injury: The pt reported a gradual progression of low back over the past several years. She initially noticed her pain with gardening and stated she would feel better with ice and stretching. She stated she has a bone in her back that pops out and hurts and then pops back in on her own.     The pt stated that she woke up one day in December and was feeling very jittery. She tried to bend down and pick something up and stated she felt a pop in her back and had a fall. She later stated she did not have a pop or a fall. She reported a gripping pain in her back for several weeks thereafter, but stated it has since resolved.     She stated she has returned to her baseline of low back pain but remains concerned with this. She described the pain as a discomfort and feels it is muscular. She has a massage every two weeks and feels they help temporarily. She has been having these for about a year and a half.     Pain is worsened with lifting, gardening, prolonged walking and standing, and is improved with sitting with back support. She gets fair relief from pain patches. She denied pain or numbness down the legs. She takes Advil when pain is bad and feels it helps, but she does not take it often. She had been exercising regularly prior to her event in December and stated she discontinued this due to  fear of injury.     She would like to improve her strength and pain with PT. She hopes to return to walking 2+ miles at time and to gardening in the spring. She plans to travel to [place] in the spring and hopes to be able to travel and walk with less pain.      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 Flattening of lumbar lordosis.     Palpation   Left   Tenderness of the erector spinae, lumbar paraspinals and quadratus lumborum.   Trigger point to erector spinae, lumbar paraspinals and quadratus lumborum.     Right Tenderness of the erector spinae, lumbar paraspinals and quadratus lumborum.   Trigger point to erector spinae, lumbar paraspinals and quadratus lumborum.     Tenderness     Lumbar Spine  Tenderness in the spinous process.     Additional Tenderness Details  Mild TTP over the L SP    Neurological Testing     Sensation     Lumbar   Left   Intact: light touch    Right   Intact: light touch    Reflexes   Left   Patellar (L4): normal (2+)  Achilles (S1): normal (2+)    Right   Patellar (L4): normal (2+)  Achilles (S1): normal (2+)    Active Range of Motion     Additional Active Range of Motion Details  Lumbar flexion: 0 cm to the floor   Lumbar extension: 50%  R Lateral flexion: 3 cm past KJL  L Lateral flexion: 2 cm to KJL  R Rotation: 32 deg  L Rotation: 45 deg      Strength/Myotome Testing     Left Hip   Planes of Motion    Flexion: 3+  Extension: 4-  Abduction: 4-  External rotation: 4-  Internal rotation: 4+    Right Hip   Planes of Motion   Flexion: 4-  Extension: 4-  Abduction: 3+  External rotation: 4-  Internal rotation: 4+    Left Knee   Flexion: 4+    Right Knee   Flexion: 4+    Left Ankle/Foot   Dorsiflexion: 4  Plantar flexion: 4    Right Ankle/Foot   Dorsiflexion: 4  Plantar flexion: 4+    Tests     Additional Tests Details  Pain with axial compression through shoulders when sitting           Assessment & Plan     Assessment  Impairments: abnormal muscle firing, abnormal or restricted ROM, activity intolerance, impaired physical strength, lacks appropriate home exercise program and pain with function  Functional Limitations: carrying objects, lifting, walking, uncomfortable because of pain, stooping and unable to perform repetitive tasks  Assessment details: The patient is an 83 yo female who presented to PT with evolving characteristics of low back pain with moderate complexity. Signs and symptoms are consistent with routine healing of an L3 stress fracture along with underlying chronic LBP with muscular incoordination syndrome. Radiographs show extensive degenerative changes in the lumbar spine with a severe compression fracture of unknown date at L3. It is likely she had an an acute injury to this level during her flare up in December. She moved relatively well today in the lumbar spine but had pain with transition to supine caused by direct pressure to the low back. Symptoms were improved with core and glute activation for stability. She was prescribed an HEP for glute/core strengthening, which was reviewed with supervision in the clinic. I expect the pt to see improved activity tolerance and function with skilled PT intervention, but due to the chronicity and severity of degenerative changes, total pain relief is not expected.    Prognosis: fair    Goals  Plan Goals: Short Term Goals (4 weeks):     1. The patient will  be independent and compliant with initial HEP.     2. The patient will report pain at rest 0/10 or less and worst pain 3/10 or less.    3. The patient will display decreased TTP in the lumbar spine and dec mm tension in the surrounding musculature.    4. B hip strength will improve to 4/5 in abd and ext.     5. LISA will improve by 6 points or more.      Long Term Goals (8 weeks):     1. The patient will be appropriate for independent management and compliant with progressed HEP.     2. The patient will report pain at rest 0/10 or less and worst pain 2/10 or less.    3. The patient will return to work duties and/or ADLs with no limitations due to LBP.     4. The patient will return to recreational and community activities with no limitations due to LBP.         Plan  Therapy options: will be seen for skilled therapy services  Planned modality interventions: cryotherapy, electrical stimulation/Russian stimulation, TENS, iontophoresis, thermotherapy (hydrocollator packs) and traction  Planned therapy interventions: abdominal trunk stabilization, manual therapy, motor coordination training, neuromuscular re-education, ADL retraining, body mechanics training, flexibility, functional ROM exercises, home exercise program, joint mobilization, postural training, soft tissue mobilization, spinal/joint mobilization, strengthening, stretching and therapeutic activities  Frequency: 1x week  Duration in visits: 8  Duration in weeks: 8  Treatment plan discussed with: patient  Plan details: The patient will likely benefit from TE/NMED to include postural reeducation and core strengthening, MT for improved joint mobility, and TA for activity modification and lifting mechanics. Modalities will be utilized as needed for pain modulation. Dry needling as indicated.         History # of Personal Factors and/or Comorbidities: MODERATE (1-2)  Examination of Body System(s): # of elements: MODERATE (3)  Clinical Presentation:  EVOLVING  Clinical Decision Making: MODERATE      Timed:         Manual Therapy:    0     mins  61910;     Therapeutic Exercise:    10     mins  12038;     Neuromuscular Zoe:    0    mins  95783;    Therapeutic Activity:     0     mins  61461;     Gait Trainin     mins  14597;     Ultrasound:     0     mins  22069;    Ionto                               0    mins   65411  Self Care                       0     mins   29285  Canalith Repos    0     mins 79975      Un-Timed:  Electrical Stimulation:    0     mins  64163 ( );  Dry Needling     0     mins self-pay  Traction     0     mins 62889  Low Eval     0     Mins  14521  Mod Eval     40     Mins  26957  High Eval                       0     Mins  86837        Timed Treatment:   10   mins   Total Treatment:     50   mins          PT: Celso Kay PT     License Number: [id number]  Electronically signed by Celso Kay PT, 23, 1:01 PM EST    Certification Period: 2023 thru 2023  I certify that the therapy services are furnished while this patient is under my care.  The services outlined above are required by this patient, and will be reviewed every 90 days.         Physician Signature:__________________________________________________    PHYSICIAN: Lynda Landrum MD  NPI: [id number]                                      DATE:      Please sign and return via fax to [email or web address] . Thank you, [facility].

## 2023-02-13 ENCOUNTER — TREATMENT (OUTPATIENT)
Dept: PHYSICAL THERAPY | Facility: CLINIC | Age: 83
End: 2023-02-13
Payer: MEDICARE

## 2023-02-13 DIAGNOSIS — R19.8 ABDOMINAL WEAKNESS: ICD-10-CM

## 2023-02-13 DIAGNOSIS — G89.29 CHRONIC BILATERAL LOW BACK PAIN WITHOUT SCIATICA: Primary | ICD-10-CM

## 2023-02-13 DIAGNOSIS — M54.50 CHRONIC BILATERAL LOW BACK PAIN WITHOUT SCIATICA: Primary | ICD-10-CM

## 2023-02-13 PROCEDURE — 97140 MANUAL THERAPY 1/> REGIONS: CPT | Performed by: PHYSICAL THERAPIST

## 2023-02-13 PROCEDURE — 97110 THERAPEUTIC EXERCISES: CPT | Performed by: PHYSICAL THERAPIST

## 2023-02-13 NOTE — PROGRESS NOTES
Physical Therapy Daily Treatment Note  [facility] [place]  [address], [address]  [place] [address]      Patient: Joanne Tineo   : 1940  Referring practitioner: Lynda Strauss*  Date of Initial Visit: Type: THERAPY  Noted: 2023  Today's Date: 2023  Patient seen for 2 sessions       Visit Diagnoses:    ICD-10-CM ICD-9-CM   1. Chronic bilateral low back pain without sciatica  M54.50 724.2    G89.29 338.29   2. Abdominal weakness  R19.8 789.9       Subjective Evaluation    History of Present Illness  Mechanism of injury: The pt stated that her back pain has been a little better since beginning her HEP. She feels she is activating her abdominal muscles better but continues to struggle taking deep breaths while stabilizing the core. She reported feeling sore after her visit and stated she will need to take medication prior to her next visit.     Pain  Current pain ratin  Location: LBP           Objective   See Exercise, Manual, and Modality Logs for complete treatment.       Assessment & Plan     Assessment    Assessment details: The pt demonstrated improved TA contraction with abdominal hollowing compared to her last visit, but struggled maintaining this contraction with exhalation. She was advised to take more normal shallow breaths and this improved core control. STM was performed to the lumbar paraspinals and was tolerated well initially, but she reported increased aching in the low back upon sitting up. This was reduced with stretching into flexion and with heat. She should continue to see improvement with core stabilization activities.     Plan  Plan details: Continue core stabilization exercises and consider rotational lumbar activities.           Timed:         Manual Therapy:    15     mins  40476;     Therapeutic Exercise:    15     mins  14681;     Neuromuscular Zoe:    0    mins  22559;    Therapeutic Activity:     0     mins  21023;     Gait Trainin      mins  24388;     Ultrasound:     0     mins  04180;    Ionto                               0    mins   23139  Self Care                       0     mins   47882  Canalith Repos    0     mins 63547      Un-Timed:  Electrical Stimulation:    0     mins  06119 ( );  Dry Needling     0     mins self-pay  Traction     0     mins 37865      Timed Treatment:   30   mins   Total Treatment:     30   mins    Celso Kay, PT  [place] License: [id number]

## 2023-02-20 ENCOUNTER — TREATMENT (OUTPATIENT)
Dept: PHYSICAL THERAPY | Facility: CLINIC | Age: 83
End: 2023-02-20
Payer: MEDICARE

## 2023-02-20 DIAGNOSIS — M54.50 CHRONIC BILATERAL LOW BACK PAIN WITHOUT SCIATICA: Primary | ICD-10-CM

## 2023-02-20 DIAGNOSIS — G89.29 CHRONIC BILATERAL LOW BACK PAIN WITHOUT SCIATICA: Primary | ICD-10-CM

## 2023-02-20 DIAGNOSIS — R19.8 ABDOMINAL WEAKNESS: ICD-10-CM

## 2023-02-20 PROCEDURE — 97110 THERAPEUTIC EXERCISES: CPT | Performed by: PHYSICAL THERAPIST

## 2023-02-20 PROCEDURE — 97140 MANUAL THERAPY 1/> REGIONS: CPT | Performed by: PHYSICAL THERAPIST

## 2023-02-20 NOTE — PROGRESS NOTES
Physical Therapy Daily Treatment Note  [facility] [place]  [address], [address]  [place] [address]      Patient: Joanne Tineo   : 1940  Referring practitioner: Lynda Strauss*  Date of Initial Visit: Type: THERAPY  Noted: 2023  Today's Date: 2023  Patient seen for 3 sessions       Visit Diagnoses:    ICD-10-CM ICD-9-CM   1. Chronic bilateral low back pain without sciatica  M54.50 724.2    G89.29 338.29   2. Abdominal weakness  R19.8 789.9       Subjective Evaluation    History of Present Illness  Mechanism of injury: The pt stated that she was sore following her last visit and was concerned her condition was worsening so she reported she took it easy this week. She felt that hip abduction or stool scoots caused the increase in pain. She managed with pain patches, heat, ice, and Advil. She performed her HEP sparingly and feels she is getting better at abdominal bracing. Symptoms have returned to baseline today.    Pain  Current pain ratin  Location: LBP           Objective   See Exercise, Manual, and Modality Logs for complete treatment.       Assessment & Plan     Assessment    Assessment details: The pt experienced an increase in LBP after her last visit that was likely attributable to DOMS in the glute and lumbar mm. She was educated on this and was advised to continue stretching, walking, and HEP performance as tolerated. She was prescribed rotational lumbar stretches and PPT for her HEP this week. PPT was performed with ease but she had some discomfort in the back with the addition of marches, likely due to movement towards an anterior pelvic tilt with core fatigue. STM was repeated and was tolerated well with no complaints of pain.    Plan  Plan details: Continue progressions of glute and core strengthening. Continue low back rotational activities.          Timed:         Manual Therapy:    15     mins  25380;     Therapeutic Exercise:    43     mins  76119;      Neuromuscular Zoe:    0    mins  14327;    Therapeutic Activity:     0     mins  62519;     Gait Trainin     mins  74601;     Ultrasound:     0     mins  20565;    Ionto                               0    mins   11915  Self Care                       0     mins   38345  Canalith Repos    0     mins 84985      Un-Timed:  Electrical Stimulation:    0     mins  02300 ( );  Dry Needling     0     mins self-pay  Traction     0     mins 17827      Timed Treatment:   58   mins   Total Treatment:     58   mins    Celso Kay, PT  [place] License: [id number]

## 2023-02-26 DIAGNOSIS — E78.00 PURE HYPERCHOLESTEROLEMIA: ICD-10-CM

## 2023-02-27 ENCOUNTER — TREATMENT (OUTPATIENT)
Dept: PHYSICAL THERAPY | Facility: CLINIC | Age: 83
End: 2023-02-27
Payer: MEDICARE

## 2023-02-27 DIAGNOSIS — M54.50 CHRONIC BILATERAL LOW BACK PAIN WITHOUT SCIATICA: Primary | ICD-10-CM

## 2023-02-27 DIAGNOSIS — G89.29 CHRONIC BILATERAL LOW BACK PAIN WITHOUT SCIATICA: Primary | ICD-10-CM

## 2023-02-27 DIAGNOSIS — R19.8 ABDOMINAL WEAKNESS: ICD-10-CM

## 2023-02-27 PROCEDURE — 97110 THERAPEUTIC EXERCISES: CPT | Performed by: PHYSICAL THERAPIST

## 2023-02-27 PROCEDURE — 97140 MANUAL THERAPY 1/> REGIONS: CPT | Performed by: PHYSICAL THERAPIST

## 2023-02-27 RX ORDER — ATORVASTATIN CALCIUM 80 MG/1
TABLET, FILM COATED ORAL
Qty: 90 TABLET | Refills: 3 | Status: SHIPPED | OUTPATIENT
Start: 2023-02-27

## 2023-02-27 NOTE — PROGRESS NOTES
Physical Therapy Daily Treatment Note  [facility] [place]  [address], [facility] [address]  [place] [address]      Patient: Joanne Tineo   : 1940  Referring practitioner: Lynda Strauss*  Date of Initial Visit: Type: THERAPY  Noted: 2023  Today's Date: 2023  Patient seen for 4 sessions       Visit Diagnoses:    ICD-10-CM ICD-9-CM   1. Chronic bilateral low back pain without sciatica  M54.50 724.2    G89.29 338.29   2. Abdominal weakness  R19.8 789.9       Subjective Evaluation    History of Present Illness  Mechanism of injury: The pt stated that her back has been feeling better this week. Most of her pain has centralized to the center of her back. She has been compliant with her HEP and feels it is going well. She will be going to [place] and [place] and will miss her visit next week. She is nervous about worsening her injury by traveling.     Pain  Current pain ratin  Location: LBP           Objective   See Exercise, Manual, and Modality Logs for complete treatment.       Assessment & Plan     Assessment    Assessment details: The pt reported low levels of back pain this week and saw a centralization of pain to the middle of the lumbar spine. Muscular symptoms were reduced today and she had minimal mm tension or TTP in the paraspinals or QLs today. Her HEP was progressed to include more functional exercises and to move away from supine activity. She tolerated these well with no complaints of pain. She is nervous about traveling this week but was encouraged that her back may get sore but is at low risk for further damage.     Plan  Plan details: Perform reassessment. Continue glute and core strengthening as tolerated.          Timed:         Manual Therapy:    15     mins  19452;     Therapeutic Exercise:    23     mins  81866;     Neuromuscular Zoe:    0    mins  10635;    Therapeutic Activity:     0     mins  85725;     Gait Trainin     mins  05684;      Ultrasound:     0     mins  02936;    Ionto                               0    mins   58623  Self Care                       0     mins   28774  Canalith Repos    0     mins 23731      Un-Timed:  Electrical Stimulation:    0     mins  17144 ( );  Dry Needling     0     mins self-pay  Traction     0     mins 95633      Timed Treatment:   38   mins   Total Treatment:     60   mins    Celso Kay, PT  [place] License: [id number]

## 2023-03-13 ENCOUNTER — TREATMENT (OUTPATIENT)
Dept: PHYSICAL THERAPY | Facility: CLINIC | Age: 83
End: 2023-03-13
Payer: MEDICARE

## 2023-03-13 DIAGNOSIS — M54.50 CHRONIC BILATERAL LOW BACK PAIN WITHOUT SCIATICA: Primary | ICD-10-CM

## 2023-03-13 DIAGNOSIS — R19.8 ABDOMINAL WEAKNESS: ICD-10-CM

## 2023-03-13 DIAGNOSIS — G89.29 CHRONIC BILATERAL LOW BACK PAIN WITHOUT SCIATICA: Primary | ICD-10-CM

## 2023-03-13 PROCEDURE — 97110 THERAPEUTIC EXERCISES: CPT | Performed by: PHYSICAL THERAPIST

## 2023-03-13 PROCEDURE — 97140 MANUAL THERAPY 1/> REGIONS: CPT | Performed by: PHYSICAL THERAPIST

## 2023-03-13 NOTE — PROGRESS NOTES
Physical Therapy Re Certification Of Plan of Care  [facility]- [place]  [address] [facility] [address]  [place] [address]    Patient: Joanne Tineo   : 1940  Diagnosis/ICD-10 Code:  Chronic bilateral low back pain without sciatica [M54.50, G89.29]  Referring practitioner: Lynda Strauss*  Date of Initial Visit: Type: THERAPY  Noted: 2023  Today's Date: 3/13/2023  Patient seen for 5 sessions         Visit Diagnoses:    ICD-10-CM ICD-9-CM   1. Chronic bilateral low back pain without sciatica  M54.50 724.2    G89.29 338.29   2. Abdominal weakness  R19.8 789.9       Subjective Questionnaire: Oswestry:   Clinical Progress: improved  Home Program Compliance: Yes  Treatment has included: therapeutic exercise, neuromuscular re-education, manual therapy and therapeutic activity      Subjective Evaluation    History of Present Illness  Mechanism of injury: The pt stated that she was travelling for the last two weeks and reported that her back held up nicely with prolonged sitting and with walking long distances. She feels that she is 100% better than she was at the time of her IE. She has remained compliant with her HEP and feels the exercises are getting easier, but are still appropriate. She is pleased with her progress thus far and would like to continue working on mobility and stretching.    Pain  Current pain ratin  Location: LBP             Objective          Postural Observations    Additional Postural Observation Details  Inc R pelvic height and severe L scoliosis in lumbar spine. Flattening of lumbar lordosis.     Palpation   Left   Tenderness of the erector spinae, lumbar paraspinals and quadratus lumborum.   Trigger point to erector spinae, lumbar paraspinals and quadratus lumborum.     Right Tenderness of the erector spinae, lumbar paraspinals and quadratus lumborum.   Trigger point to erector spinae, lumbar paraspinals and quadratus lumborum.     Tenderness     Lumbar  Spine  Tenderness in the spinous process.     Additional Tenderness Details  Mild TTP over the lumbar SPs - nonspecific    Neurological Testing     Sensation     Lumbar   Left   Intact: light touch    Right   Intact: light touch    Reflexes   Left   Patellar (L4): normal (2+)  Achilles (S1): normal (2+)    Right   Patellar (L4): normal (2+)  Achilles (S1): normal (2+)    Active Range of Motion     Additional Active Range of Motion Details  Lumbar flexion: 0 cm to the floor   Lumbar extension: 75%  R Lateral flexion: 3 cm past KJL  L Lateral flexion: 0 cm to KJL  R Rotation: 35 deg  L Rotation: 45 deg      Strength/Myotome Testing     Left Hip   Planes of Motion   Flexion: 4  Extension: 4-  Abduction: 4  External rotation: 4  Internal rotation: 5    Right Hip   Planes of Motion   Flexion: 4  Extension: 4-  Abduction: 4  External rotation: 4+  Internal rotation: 4+    Left Knee   Flexion: 5    Right Knee   Flexion: 5  Extension: 5    Left Ankle/Foot   Dorsiflexion: 5  Plantar flexion: 4+    Right Ankle/Foot   Dorsiflexion: 5  Plantar flexion: 4+    Tests     Additional Tests Details  No pain with axial compression through shoulders when sitting           Assessment & Plan     Assessment  Impairments: abnormal muscle firing, abnormal or restricted ROM, activity intolerance, impaired physical strength, lacks appropriate home exercise program and pain with function  Functional Limitations: carrying objects, lifting, walking, uncomfortable because of pain, stooping and unable to perform repetitive tasks  Assessment details: The patient has responded well to initial PT interventions for acute low back pain. Symptoms have become exceedingly mild with her day to day activity, and her tolerance to prolonged sitting and walking have significantly improved. She has ongoing muscle tension in the lower lumbar musculature, but this should improve with consistent stretching and self performed massage. She was introduced to a  massage cane and was encouraged to purchase one for home use. Core and glute strengthening exercises are getting easier, but remain appropriate. More challenging exercises were continued in the clinic and were tolerated well but her HEP was unchanged today. She is doing well and should continue to see improvement with soft tissue mobilization, stretching, and core strengthening.    Prognosis: fair    Goals  Plan Goals: Short Term Goals (4 weeks):     1. The patient will be independent and compliant with initial HEP. Met    2. The patient will report pain at rest 0/10 or less and worst pain 3/10 or less. Ongoing     3. The patient will display decreased TTP in the lumbar spine and dec mm tension in the surrounding musculature. Met    4. B hip strength will improve to 4/5 in abd and ext. Met    5. LISA will improve by 6 points or more. Ongoing       Long Term Goals (8 weeks):     1. The patient will be appropriate for independent management and compliant with progressed HEP. Ongoing     2. The patient will report pain at rest 0/10 or less and worst pain 2/10 or less. Ongoing     3. The patient will return to work duties and/or ADLs with no limitations due to LBP. Ongoing     4. The patient will return to recreational and community activities with no limitations due to LBP. Ongoing         Plan  Therapy options: will be seen for skilled therapy services  Planned modality interventions: cryotherapy, electrical stimulation/Russian stimulation, TENS, iontophoresis, thermotherapy (hydrocollator packs) and traction  Planned therapy interventions: abdominal trunk stabilization, manual therapy, motor coordination training, neuromuscular re-education, ADL retraining, body mechanics training, flexibility, functional ROM exercises, home exercise program, joint mobilization, postural training, soft tissue mobilization, spinal/joint mobilization, strengthening, stretching and therapeutic activities  Frequency: 1x week  Duration in  visits: 8  Duration in weeks: 8  Treatment plan discussed with: patient  Plan details: Continue joint mobilization, soft tissue mobilization, and strengthening exercises for the core and lower extremities.           Recommendations: Continue as planned  Timeframe: 2 months  Prognosis to achieve goals: good      Timed:         Manual Therapy:    15     mins  88884;     Therapeutic Exercise:    25     mins  59316;     Neuromuscular Zoe:    0    mins  42461;    Therapeutic Activity:     0     mins  84212;     Gait Trainin     mins  66031;     Ultrasound:     0     mins  38109;    Ionto                               0    mins   37575  Self Care                       0     mins   22636  Canalith Repos    0     mins 44294      Un-Timed:  Electrical Stimulation:    0     mins  58687 ( );  Dry Needling     0     mins self-pay  Traction     0     mins 89027  Re-Eval                           0    mins  20826      Timed Treatment:   40   mins   Total Treatment:     65   mins          PT: Celso Kay PT     [place] License:  [id number]    Electronically signed by Celso Kay PT, 23, 3:34 PM EDT    Certification Period: 3/13/2023 thru 6/10/2023  I certify that the therapy services are furnished while this patient is under my care.  The services outlined above are required by this patient, and will be reviewed every 90 days.         Physician Signature:__________________________________________________    PHYSICIAN: Lynda Landrum MD  NPI: [id number]                                      DATE:  :     Please sign and return via fax to [email or web address] . Thank you, [facility]

## 2023-03-20 ENCOUNTER — TREATMENT (OUTPATIENT)
Dept: PHYSICAL THERAPY | Facility: CLINIC | Age: 83
End: 2023-03-20
Payer: MEDICARE

## 2023-03-20 DIAGNOSIS — R19.8 ABDOMINAL WEAKNESS: Primary | ICD-10-CM

## 2023-03-20 DIAGNOSIS — G89.29 CHRONIC BILATERAL LOW BACK PAIN WITHOUT SCIATICA: ICD-10-CM

## 2023-03-20 DIAGNOSIS — M54.50 CHRONIC BILATERAL LOW BACK PAIN WITHOUT SCIATICA: ICD-10-CM

## 2023-03-20 PROCEDURE — 97110 THERAPEUTIC EXERCISES: CPT | Performed by: PHYSICAL THERAPIST

## 2023-03-20 PROCEDURE — 97140 MANUAL THERAPY 1/> REGIONS: CPT | Performed by: PHYSICAL THERAPIST

## 2023-03-20 NOTE — PROGRESS NOTES
Physical Therapy Daily Treatment Note  [facility] [place]  [address], [facility] [address]  [place] [address]      Patient: Joanne Tineo   : 1940  Referring practitioner: Lynda Strauss*  Date of Initial Visit: Type: THERAPY  Noted: 2023  Today's Date: 3/20/2023  Patient seen for 6 sessions       Visit Diagnoses:    ICD-10-CM ICD-9-CM   1. Abdominal weakness  R19.8 789.9   2. Chronic bilateral low back pain without sciatica  M54.50 724.2    G89.29 338.29       Subjective Evaluation    History of Present Illness  Mechanism of injury: The pt stated that her low back achiness has persisted this week. She ordered a theracane and has been performing self massage with some improvement. She feels that heat has been effective in managing symptoms between visits. She continues to report pain with lifting and with prolonged standing when cooking.     Pain  Current pain ratin  Location: LBP           Objective   See Exercise, Manual, and Modality Logs for complete treatment.       Assessment & Plan     Assessment    Assessment details: The pt continues to experience aching in her low back with lifting and prolonged standing, likely attributable to reduced core strength. This would also explain the excessive mm tension in the lumbar paraspinals. She has been responding well to massage techniques in the clinic and to self massage with a cane at home, so STM and heat were repeated today with continued success. Exercises for the glutes and core were continued today, and she may benefit from deadlifting exercises in the future.     Plan  Plan details: Continue core strengthening, STM and heat for mm tension, and consider deadlifts.          Timed:         Manual Therapy:    23     mins  45076;     Therapeutic Exercise:    20     mins  99201;     Neuromuscular Zoe:    0    mins  40445;    Therapeutic Activity:     0     mins  83069;     Gait Trainin     mins  10996;     Ultrasound:     0      mins  62946;    Ionto                               0    mins   20945  Self Care                       0     mins   93049  Canalith Repos    0     mins 74782      Un-Timed:  Electrical Stimulation:    0     mins  48307 ( );  Dry Needling     0     mins self-pay  Traction     0     mins 70616      Timed Treatment:   43   mins   Total Treatment:     60   mins    Celso Kay, PT  [place] License: [id number]

## 2023-03-23 ENCOUNTER — OFFICE VISIT (OUTPATIENT)
Dept: INTERNAL MEDICINE | Facility: CLINIC | Age: 83
End: 2023-03-23
Payer: MEDICARE

## 2023-03-23 VITALS
BODY MASS INDEX: 24.16 KG/M2 | HEIGHT: 65 IN | HEART RATE: 83 BPM | DIASTOLIC BLOOD PRESSURE: 88 MMHG | OXYGEN SATURATION: 100 % | WEIGHT: 145 LBS | SYSTOLIC BLOOD PRESSURE: 130 MMHG

## 2023-03-23 DIAGNOSIS — R03.0 ELEVATED BLOOD PRESSURE READING IN OFFICE WITHOUT DIAGNOSIS OF HYPERTENSION: Primary | ICD-10-CM

## 2023-03-23 DIAGNOSIS — M54.42 ACUTE LEFT-SIDED LOW BACK PAIN WITH LEFT-SIDED SCIATICA: ICD-10-CM

## 2023-03-23 PROBLEM — I10 PRIMARY HYPERTENSION: Status: ACTIVE | Noted: 2023-03-23

## 2023-03-23 PROCEDURE — 99213 OFFICE O/P EST LOW 20 MIN: CPT | Performed by: INTERNAL MEDICINE

## 2023-03-23 PROCEDURE — 3075F SYST BP GE 130 - 139MM HG: CPT | Performed by: INTERNAL MEDICINE

## 2023-03-23 PROCEDURE — 3079F DIAST BP 80-89 MM HG: CPT | Performed by: INTERNAL MEDICINE

## 2023-03-23 NOTE — PROGRESS NOTES
"Subjective   Joanne Tineo is a 82 y.o. female here for follow-up back pain and elevated BP. She has stopped her BP med and BP has been good. Thinks elevated BP was related to back pain. She has been in PT and back pain has much improved--she is very pleased. No HA.      I have reviewed the following portions of the patient's history and confirmed they are accurate: current medications, past medical history, past social history and problem list     I have personally reviewed and performed the ROS. Lynda Landrum MD     Review of Systems:  General: negative  Neuro: negative  MSK: back pain    Objective   /88 (BP Location: Left arm)   Pulse 83   Ht 165.1 cm (65\")   Wt 65.8 kg (145 lb)   SpO2 100%   BMI 24.13 kg/m²     Physical Exam  Vitals reviewed.   Constitutional:       Appearance: She is well-developed.   Pulmonary:      Effort: Pulmonary effort is normal.   Skin:     General: Skin is warm and dry.   Neurological:      Mental Status: She is alert and oriented to person, place, and time.   Psychiatric:         Behavior: Behavior normal.         Thought Content: Thought content normal.         Judgment: Judgment normal.         Assessment & Plan   Diagnoses and all orders for this visit:    1. Elevated blood pressure reading in office without diagnosis of hypertension (Primary)  -BP seems to have recovered off of med. Likely previously related to back pain    2. Acute left lower back pain with left sided sciatica  -seeing PT, vastly improved  -reviewed PT notes           Lynda Landrum MD  "

## 2023-03-27 ENCOUNTER — TREATMENT (OUTPATIENT)
Dept: PHYSICAL THERAPY | Facility: CLINIC | Age: 83
End: 2023-03-27
Payer: MEDICARE

## 2023-03-27 DIAGNOSIS — R19.8 ABDOMINAL WEAKNESS: Primary | ICD-10-CM

## 2023-03-27 DIAGNOSIS — G89.29 CHRONIC BILATERAL LOW BACK PAIN WITHOUT SCIATICA: ICD-10-CM

## 2023-03-27 DIAGNOSIS — M54.50 CHRONIC BILATERAL LOW BACK PAIN WITHOUT SCIATICA: ICD-10-CM

## 2023-03-27 PROCEDURE — 97140 MANUAL THERAPY 1/> REGIONS: CPT | Performed by: PHYSICAL THERAPIST

## 2023-03-27 PROCEDURE — 97110 THERAPEUTIC EXERCISES: CPT | Performed by: PHYSICAL THERAPIST

## 2023-03-27 NOTE — PROGRESS NOTES
Physical Therapy Daily Treatment Note  [facility] [place]  [address], [address]  [place] [address]      Patient: Joanne Tineo   : 1940  Referring practitioner: Lynda Strauss*  Date of Initial Visit: Type: THERAPY  Noted: 2023  Today's Date: 3/27/2023  Patient seen for 7 sessions       Visit Diagnoses:    ICD-10-CM ICD-9-CM   1. Abdominal weakness  R19.8 789.9   2. Chronic bilateral low back pain without sciatica  M54.50 724.2    G89.29 338.29       Subjective Evaluation    History of Present Illness  Mechanism of injury: The pt stated that she has learned she can slowly extend her legs when in supine and reduce her back pain. She has been sleeping on her back with her feet propped on a pillow and believes it has made her taller. She reported her low back felt very good today. She has been managing discomfort with stretches and heat.     Pain  Current pain ratin  Location: LBP           Objective   See Exercise, Manual, and Modality Logs for complete treatment.       Assessment & Plan     Assessment    Assessment details: The pt reported reduced pain in the low back this week and demonstrated improved fluidity with transitions from supine <> sit and sit <> stand. She was able to tolerate supine positioning for the first time today, despite some discomfort, and performed supine leg lifts for ab strengthening. These were added to her HEP in place of EOB leg lifts, as they should yield more efficient strength gains. She had minimal TTP in the lumbar mm and mm tension was palpably decreased compared to previous visits.     Plan  Plan details: Continue core and glute strengthening, lumbar stretching and mobilizations, and heat as needed.           Timed:         Manual Therapy:    25     mins  45862;     Therapeutic Exercise:    28     mins  29939;     Neuromuscular Zoe:    0    mins  83035;    Therapeutic Activity:     0     mins  85787;     Gait Trainin     mins   46370;     Ultrasound:     0     mins  34024;    Ionto                               0    mins   70585  Self Care                       0     mins   82723  Canalith Repos    0     mins 20156      Un-Timed:  Electrical Stimulation:    0     mins  98880 ( );  Dry Needling     0     mins self-pay  Traction     0     mins 99774      Timed Treatment:   53   mins   Total Treatment:     63   mins    Celso Kay, PT  [place] License: [id number]

## 2023-03-31 DIAGNOSIS — F41.1 GAD (GENERALIZED ANXIETY DISORDER): ICD-10-CM

## 2023-03-31 RX ORDER — ESCITALOPRAM OXALATE 20 MG/1
20 TABLET ORAL DAILY
Qty: 90 TABLET | Refills: 1 | Status: SHIPPED | OUTPATIENT
Start: 2023-03-31

## 2023-04-03 ENCOUNTER — TREATMENT (OUTPATIENT)
Dept: PHYSICAL THERAPY | Facility: CLINIC | Age: 83
End: 2023-04-03
Payer: MEDICARE

## 2023-04-03 DIAGNOSIS — R19.8 ABDOMINAL WEAKNESS: ICD-10-CM

## 2023-04-03 DIAGNOSIS — G89.29 CHRONIC BILATERAL LOW BACK PAIN WITHOUT SCIATICA: Primary | ICD-10-CM

## 2023-04-03 DIAGNOSIS — M54.50 CHRONIC BILATERAL LOW BACK PAIN WITHOUT SCIATICA: Primary | ICD-10-CM

## 2023-04-03 PROCEDURE — 97110 THERAPEUTIC EXERCISES: CPT | Performed by: PHYSICAL THERAPIST

## 2023-04-03 PROCEDURE — 97140 MANUAL THERAPY 1/> REGIONS: CPT | Performed by: PHYSICAL THERAPIST

## 2023-04-03 NOTE — PROGRESS NOTES
Physical Therapy Daily Treatment Note  [facility]- [place]  [address], [address]  [place] [address]      Patient: Joanne Tineo   : 1940  Referring practitioner: Lynda Strauss*  Date of Initial Visit: Type: THERAPY  Noted: 2023  Today's Date: 4/3/2023  Patient seen for 8 sessions       Visit Diagnoses:    ICD-10-CM ICD-9-CM   1. Chronic bilateral low back pain without sciatica  M54.50 724.2    G89.29 338.29   2. Abdominal weakness  R19.8 789.9       Subjective Evaluation    History of Present Illness  Mechanism of injury: The pt stated that her low back was tight on Tuesday and it became achy by Wednesday. Symptoms eased up later in the week. L sided back pain was mild today but she reported new discomfort in the R side of the low back. She had her family in town this week and has been less compliant with her HEP as a result.     Pain  Current pain ratin  Location: LBP           Objective   See Exercise, Manual, and Modality Logs for complete treatment.       Assessment & Plan     Assessment    Assessment details: The pt experienced an increase in soreness following her last visit that is likely attributable to progressed exercises. She was educated on the expected soreness for 48 hours following exercise and she was understanding of this. There was no apparent increase in mm tension today and she tolerated manual interventions very well. Deadlifts were introduced for posterior core stability and were tolerated well despite fatigue. Barring any setbacks, these should be continued moving forward along with additional functional lifts.     Plan  Plan details: Continue progressions of functional lifting exercises as tolerated.          Timed:         Manual Therapy:    25     mins  18366;     Therapeutic Exercise:    13     mins  32110;     Neuromuscular Zoe:    0    mins  19760;    Therapeutic Activity:     0     mins  79858;     Gait Trainin     mins  58140;      Ultrasound:     0     mins  46485;    Ionto                               0    mins   39480  Self Care                       0     mins   91055  Canalith Repos    0     mins 74590      Un-Timed:  Electrical Stimulation:    0     mins  48644 ( );  Dry Needling     0     mins self-pay  Traction     0     mins 64770      Timed Treatment:   38   mins   Total Treatment:     70   mins    Celso Kay, PT  [place] License: [id number]

## 2023-04-10 ENCOUNTER — TREATMENT (OUTPATIENT)
Dept: PHYSICAL THERAPY | Facility: CLINIC | Age: 83
End: 2023-04-10
Payer: MEDICARE

## 2023-04-10 DIAGNOSIS — M54.50 CHRONIC BILATERAL LOW BACK PAIN WITHOUT SCIATICA: Primary | ICD-10-CM

## 2023-04-10 DIAGNOSIS — R19.8 ABDOMINAL WEAKNESS: ICD-10-CM

## 2023-04-10 DIAGNOSIS — G89.29 CHRONIC BILATERAL LOW BACK PAIN WITHOUT SCIATICA: Primary | ICD-10-CM

## 2023-04-10 PROCEDURE — 97140 MANUAL THERAPY 1/> REGIONS: CPT | Performed by: PHYSICAL THERAPIST

## 2023-04-10 PROCEDURE — 97110 THERAPEUTIC EXERCISES: CPT | Performed by: PHYSICAL THERAPIST

## 2023-04-10 NOTE — PROGRESS NOTES
Physical Therapy Daily Treatment Note  [facility] [place]  [address], [address]  [place] [address]      Patient: Joanne Tineo   : 1940  Referring practitioner: Lynda Strauss*  Date of Initial Visit: Type: THERAPY  Noted: 2023  Today's Date: 4/10/2023  Patient seen for 9 sessions       Visit Diagnoses:    ICD-10-CM ICD-9-CM   1. Chronic bilateral low back pain without sciatica  M54.50 724.2    G89.29 338.29   2. Abdominal weakness  R19.8 789.9       Subjective Evaluation    History of Present Illness  Mechanism of injury: The pt stated that her back has felt very good this week and reported she was able to run errands with no discomfort for the first time in recent memory. She has been compliant with her HEP and feels they exercises are going well. She believes manual therapy is helping her the most.     Pain  Current pain ratin  Location: LBP           Objective   See Exercise, Manual, and Modality Logs for complete treatment.       Assessment & Plan     Assessment    Assessment details: The pt had a very good week in regards to back pain and did not seem restricted by pain or dysfunction with movement in the clinic today. Transitions from sit <> supine remain challenging but did not seem limited by pain. Minimal mm tension was observed in the lumbar paraspinals and QLs so the majority of time with manual therapy was performed to the hips. Deadlifts were tolerated well last visit so they were added to an HEP for independent use. Because she is doing so much better she was advised to attempt 2 weeks of independent management before returning to PT.     Plan  Plan details: Continue high rep strengthening of the core mm in flexion positions. Consider d/c if doing well in 2 weeks.          Timed:         Manual Therapy:    30     mins  50465;     Therapeutic Exercise:    23     mins  14221;     Neuromuscular Zoe:    0    mins  67991;    Therapeutic Activity:     0     mins   47766;     Gait Trainin     mins  13073;     Ultrasound:     0     mins  59794;    Ionto                               0    mins   33511  Self Care                       0     mins   55640  Canalith Repos    0     mins 43140      Un-Timed:  Electrical Stimulation:    0     mins  81128 ( );  Dry Needling     0     mins self-pay  Traction     0     mins 06500      Timed Treatment:   53   mins   Total Treatment:     53   mins    Celso Kay PT  [place] License: [id number]

## 2023-04-24 ENCOUNTER — TREATMENT (OUTPATIENT)
Dept: PHYSICAL THERAPY | Facility: CLINIC | Age: 83
End: 2023-04-24
Payer: MEDICARE

## 2023-04-24 DIAGNOSIS — M54.50 CHRONIC BILATERAL LOW BACK PAIN WITHOUT SCIATICA: Primary | ICD-10-CM

## 2023-04-24 DIAGNOSIS — R19.8 ABDOMINAL WEAKNESS: ICD-10-CM

## 2023-04-24 DIAGNOSIS — G89.29 CHRONIC BILATERAL LOW BACK PAIN WITHOUT SCIATICA: Primary | ICD-10-CM

## 2023-04-24 NOTE — PROGRESS NOTES
Physical Therapy Re Certification Of Plan of Care  [facility] [place]  [address] [facility] [address]  [place] [address]    Patient: Joanne Tineo   : 1940  Diagnosis/ICD-10 Code:  Chronic bilateral low back pain without sciatica [M54.50, G89.29]  Referring practitioner: Lynda Strauss*  Date of Initial Visit: Type: THERAPY  Noted: 2023  Today's Date: 2023  Patient seen for 10 sessions         Visit Diagnoses:    ICD-10-CM ICD-9-CM   1. Chronic bilateral low back pain without sciatica  M54.50 724.2    G89.29 338.29   2. Abdominal weakness  R19.8 789.9       Subjective Questionnaire: Oswestry:   Clinical Progress: improved  Home Program Compliance: Yes  Treatment has included: therapeutic exercise, neuromuscular re-education, manual therapy, therapeutic activity and moist heat      Subjective Evaluation    History of Present Illness  Mechanism of injury: The pt stated that her back pain has been generally mild since her last visit, but reported soreness after working in the yard and pulling weeds. She was able to manage the discomfort with heat and stretching. She has not attempted deadlifts this week because she is afraid to worsen her back pain. She continues to report apprehension with lifting and bending, but overall is pleased with her progress and feels she is getting better.    Pain  Current pain ratin  Location: LBP             Objective          Postural Observations    Additional Postural Observation Details  Inc R pelvic height and severe L scoliosis in lumbar spine. Flattening of lumbar lordosis.     Palpation   Left   No palpable tenderness to the erector spinae, lumbar paraspinals and quadratus lumborum.   Hypertonic in the erector spinae, lumbar paraspinals and quadratus lumborum.     Right   No palpable tenderness to the erector spinae, lumbar paraspinals and quadratus lumborum.   Hypertonic in the erector spinae, lumbar paraspinals and quadratus lumborum.      Tenderness     Lumbar Spine  Tenderness in the spinous process.     Additional Tenderness Details  Mild TTP over the lumbar SPs - nonspecific    Neurological Testing     Sensation     Lumbar   Left   Intact: light touch    Right   Intact: light touch    Reflexes   Left   Patellar (L4): normal (2+)  Achilles (S1): normal (2+)    Right   Patellar (L4): normal (2+)  Achilles (S1): normal (2+)    Active Range of Motion     Additional Active Range of Motion Details  Lumbar flexion: 0 cm to the floor   Lumbar extension: 75%  R Lateral flexion: 3 cm past KJL  L Lateral flexion: 0 cm to KJL  R Rotation: 42 deg  L Rotation: 45 deg      Strength/Myotome Testing     Left Hip   Planes of Motion   Flexion: 4+  Extension: 4-  Abduction: 4  External rotation: 4+  Internal rotation: 5    Right Hip   Planes of Motion   Flexion: 4+  Extension: 4  Abduction: 4  External rotation: 4+  Internal rotation: 4+    Left Knee   Flexion: 5    Right Knee   Flexion: 5  Extension: 5    Left Ankle/Foot   Dorsiflexion: 5  Plantar flexion: 4+    Right Ankle/Foot   Dorsiflexion: 5  Plantar flexion: 5    Tests     Additional Tests Details  No pain with axial compression through shoulders when sitting           Assessment & Plan     Assessment  Impairments: abnormal muscle firing, abnormal or restricted ROM, activity intolerance, impaired physical strength, lacks appropriate home exercise program and pain with function  Functional Limitations: carrying objects, lifting, walking, uncomfortable because of pain, stooping and unable to perform repetitive tasks  Assessment details: The patient continues to respond well to PT interventions for low back pain, which have transitioned from light stretching and core activation exercises to more functional strengthening of the glutes, core, and lumbar extensors in recent weeks. She demonstrated improved LE strength today but remains limited into hip extension and abduction. Extension was the weakest movement  tested today and should improve along with spinal extensor strength as she works on [employer]. She has been apprehensive to perform this exercise at home, but it was reviewed in the clinic today and was performed with no pain. She is likely to experience soreness from this exercise but is unlikely to sustain injury with correct form. Lumbar mm tension has improved significantly and she reported minimal TTP in the lumbar spine today. Mobility is coming along nicely and should continue to improve with stretching and mobility exercise.  Prognosis: fair    Goals  Plan Goals: Short Term Goals (4 weeks):     1. The patient will be independent and compliant with initial HEP. Met    2. The patient will report pain at rest 0/10 or less and worst pain 3/10 or less. Met    3. The patient will display decreased TTP in the lumbar spine and dec mm tension in the surrounding musculature. Met    4. B hip strength will improve to 4/5 in abd and ext. Met    5. LISA will improve by 6 points or more. Ongoing       Long Term Goals (8 weeks):     1. The patient will be appropriate for independent management and compliant with progressed HEP. Ongoing     2. The patient will report pain at rest 0/10 or less and worst pain 2/10 or less. Ongoing     3. The patient will return to work duties and/or ADLs with no limitations due to LBP. Ongoing     4. The patient will return to recreational and community activities with no limitations due to LBP. Ongoing         Plan  Therapy options: will be seen for skilled therapy services  Planned modality interventions: cryotherapy, electrical stimulation/Russian stimulation, TENS, iontophoresis, thermotherapy (hydrocollator packs) and traction  Planned therapy interventions: abdominal trunk stabilization, manual therapy, motor coordination training, neuromuscular re-education, ADL retraining, body mechanics training, flexibility, functional ROM exercises, home exercise program, joint mobilization,  postural training, soft tissue mobilization, spinal/joint mobilization, strengthening, stretching and therapeutic activities  Frequency: 1x week  Duration in visits: 8  Duration in weeks: 8  Treatment plan discussed with: patient  Plan details: Continue joint mobilization, soft tissue mobilization, and strengthening exercises for the core and lower extremities.           Recommendations: Continue as planned  Timeframe: 1 month  Prognosis to achieve goals: good      Timed:         Manual Therapy:    10     mins  75210;     Therapeutic Exercise:    45     mins  26763;     Neuromuscular Zoe:    0    mins  01521;    Therapeutic Activity:     0     mins  50324;     Gait Trainin     mins  68050;     Ultrasound:     0     mins  80323;    Ionto                               0    mins   25557  Self Care                       0     mins   46035  Canalith Repos    0     mins 91228      Un-Timed:  Electrical Stimulation:    0     mins  46479 ( );  Dry Needling     0     mins self-pay  Traction     0     mins 98416  Re-Eval                           0    mins  27043      Timed Treatment:   55   mins   Total Treatment:     55   mins          PT: Celso Kay PT     [place] License:  [id number]    Electronically signed by Celso Kay PT, 23, 3:29 PM EDT    Certification Period: 2023 thru 2023  I certify that the therapy services are furnished while this patient is under my care.  The services outlined above are required by this patient, and will be reviewed every 90 days.         Physician Signature:__________________________________________________    PHYSICIAN: Lynda Landrum MD  NPI: [id number]                                      DATE:  :     Please sign and return via fax to [email or web address] . Thank you, [facility]

## 2023-05-03 DIAGNOSIS — I10 ELEVATED BLOOD PRESSURE READING IN OFFICE WITH DIAGNOSIS OF HYPERTENSION: ICD-10-CM

## 2023-05-03 RX ORDER — LISINOPRIL 10 MG/1
TABLET ORAL
Qty: 30 TABLET | Refills: 2 | Status: SHIPPED | OUTPATIENT
Start: 2023-05-03

## 2023-05-15 ENCOUNTER — TREATMENT (OUTPATIENT)
Dept: PHYSICAL THERAPY | Facility: CLINIC | Age: 83
End: 2023-05-15
Payer: MEDICARE

## 2023-05-15 DIAGNOSIS — M54.50 CHRONIC BILATERAL LOW BACK PAIN WITHOUT SCIATICA: Primary | ICD-10-CM

## 2023-05-15 DIAGNOSIS — G89.29 CHRONIC BILATERAL LOW BACK PAIN WITHOUT SCIATICA: Primary | ICD-10-CM

## 2023-05-15 DIAGNOSIS — R19.8 ABDOMINAL WEAKNESS: ICD-10-CM

## 2023-05-15 NOTE — PROGRESS NOTES
Physical Therapy Daily Treatment Note  [facility] [place] [facility]  [address]  [place] [address]      Patient: Joanne Tineo   : 1940  Referring practitioner: No ref. provider found  Date of Initial Visit: Type: THERAPY  Noted: 2023  Today's Date: 5/15/2023  Patient seen for 11 sessions       Visit Diagnoses:  No diagnosis found.    Subjective Evaluation    History of Present Illness  Mechanism of injury: The pt stated that her back pain has fluctuated with activity, particularly with working in her yard, but she has been able to manage symptoms with heat and rest. She had one day last week where she had to take medication for pain. She has been tolerating daily activity very well and stated that low back pain is generally very mild. She has been compliant with her HEP and feels her exercises are appropriate. She is agreeable to d/c today.     Pain  Current pain ratin  At worst pain ratin  Location: LBP           Objective   See Exercise, Manual, and Modality Logs for complete treatment.       Assessment & Plan     Assessment    Assessment details: The pt continues to report intermittent back pain with yard work, but is tolerating ADLs and normal community activity very well. She is managing her back pain well, when present, and is understanding of the need for continued exercise to increase the threshold for overuse. She had minimal TTP in the low back today and mm tension was normal in the paraspinals. She has met all of her goals for PT and is appropriate for d/c.    Goals  Plan Goals: Short Term Goals (4 weeks):     1. The patient will be independent and compliant with initial HEP. Met    2. The patient will report pain at rest 0/10 or less and worst pain 3/10 or less. Met    3. The patient will display decreased TTP in the lumbar spine and dec mm tension in the surrounding musculature. Met    4. B hip strength will improve to 4/5 in abd and ext. Met    5. LISA will  improve by 6 points or more. Met       Long Term Goals (8 weeks):     1. The patient will be appropriate for independent management and compliant with progressed HEP. Met    2. The patient will report pain at rest 0/10 or less and worst pain 2/10 or less. Not met    3. The patient will return to work duties and/or ADLs with no limitations due to LBP. Partially met    4. The patient will return to recreational and community activities with no limitations due to LBP. Met      Plan  Plan details: Pt to be d/c.          Timed:         Manual Therapy:    15     mins  17297;     Therapeutic Exercise:    23     mins  86936;     Neuromuscular Zoe:    0    mins  94029;    Therapeutic Activity:     0     mins  50894;     Gait Trainin     mins  45897;     Ultrasound:     0     mins  29073;    Ionto                               0    mins   08264  Self Care                       0     mins   62465  Canalith Repos    0     mins 76054      Un-Timed:  Electrical Stimulation:    0     mins  95444 ([facility] );  Dry Needling     0     mins self-pay  Traction     0     mins 34365      Timed Treatment:   38   mins   Total Treatment:     38   mins    Celso Kay PT  [place] License: [id number]        Discharge Summary  Discharge Summary from Physical Therapy Report      Date of initial PT visit: 23    Number of Visits: 11     Goals: All Met    Discharge Plan: Continue with current home exercise program as instructed    Comments: see assessment    Date of Discharge: 5/15/23        Celso Kay PT  Physical Therapist

## 2023-08-05 DIAGNOSIS — I10 ELEVATED BLOOD PRESSURE READING IN OFFICE WITH DIAGNOSIS OF HYPERTENSION: ICD-10-CM

## 2023-08-07 RX ORDER — LISINOPRIL 10 MG/1
TABLET ORAL
Qty: 90 TABLET | Refills: 2 | Status: SHIPPED | OUTPATIENT
Start: 2023-08-07

## 2023-08-22 ENCOUNTER — OFFICE VISIT (OUTPATIENT)
Dept: INTERNAL MEDICINE | Facility: CLINIC | Age: 83
End: 2023-08-22
Payer: MEDICARE

## 2023-08-22 VITALS
WEIGHT: 146 LBS | OXYGEN SATURATION: 95 % | HEIGHT: 65 IN | SYSTOLIC BLOOD PRESSURE: 138 MMHG | DIASTOLIC BLOOD PRESSURE: 88 MMHG | BODY MASS INDEX: 24.32 KG/M2 | HEART RATE: 73 BPM

## 2023-08-22 DIAGNOSIS — M25.572 CHRONIC PAIN OF LEFT ANKLE: Primary | ICD-10-CM

## 2023-08-22 DIAGNOSIS — Z23 NEED FOR PNEUMOCOCCAL VACCINE: ICD-10-CM

## 2023-08-22 DIAGNOSIS — G89.29 CHRONIC LEFT-SIDED LOW BACK PAIN WITH LEFT-SIDED SCIATICA: ICD-10-CM

## 2023-08-22 DIAGNOSIS — G89.29 CHRONIC PAIN OF LEFT KNEE: ICD-10-CM

## 2023-08-22 DIAGNOSIS — M54.42 CHRONIC LEFT-SIDED LOW BACK PAIN WITH LEFT-SIDED SCIATICA: ICD-10-CM

## 2023-08-22 DIAGNOSIS — M25.562 CHRONIC PAIN OF LEFT KNEE: ICD-10-CM

## 2023-08-22 DIAGNOSIS — G89.29 CHRONIC PAIN OF LEFT ANKLE: Primary | ICD-10-CM

## 2023-08-22 PROCEDURE — 3079F DIAST BP 80-89 MM HG: CPT | Performed by: INTERNAL MEDICINE

## 2023-08-22 PROCEDURE — 99213 OFFICE O/P EST LOW 20 MIN: CPT | Performed by: INTERNAL MEDICINE

## 2023-08-22 PROCEDURE — 1160F RVW MEDS BY RX/DR IN RCRD: CPT | Performed by: INTERNAL MEDICINE

## 2023-08-22 PROCEDURE — 3075F SYST BP GE 130 - 139MM HG: CPT | Performed by: INTERNAL MEDICINE

## 2023-08-22 PROCEDURE — 1159F MED LIST DOCD IN RCRD: CPT | Performed by: INTERNAL MEDICINE

## 2023-08-22 PROCEDURE — G0009 ADMIN PNEUMOCOCCAL VACCINE: HCPCS | Performed by: INTERNAL MEDICINE

## 2023-08-22 PROCEDURE — 90677 PCV20 VACCINE IM: CPT | Performed by: INTERNAL MEDICINE

## 2023-08-22 RX ORDER — LATANOPROST 50 UG/ML
1 SOLUTION/ DROPS OPHTHALMIC NIGHTLY
COMMUNITY
Start: 2023-07-05

## 2023-08-22 RX ORDER — TIMOLOL MALEATE 5 MG/ML
1 SOLUTION/ DROPS OPHTHALMIC 2 TIMES DAILY
COMMUNITY
Start: 2023-07-05

## 2023-08-22 NOTE — PROGRESS NOTES
"Subjective   Joanne Tineo is a 83 y.o. female here for left ankle and knee pain, left low back pain.  She had been going to physical therapy and that greatly improved her back pain.  Now, however, she has pain in the left buttock that goes into the left leg.  She describes that as a numbness.  She also has left ankle pain which is worse with movement.  It extends from the big toe up to the ankle.  She found a spot on her thigh recently where she could press and it relieves the pain in the toe.  She has mild swelling of the lower extremities which is chronic.    I have reviewed the patient's relevant medical history and confirmed it is accurate.    I have personally reviewed and performed the ROS. Lynda Landrum MD     Objective   /88 (BP Location: Left arm)   Pulse 73   Ht 165.1 cm (65\")   Wt 66.2 kg (146 lb)   SpO2 95%   BMI 24.30 kg/mý     Physical Exam  Vitals reviewed.   Constitutional:       Appearance: She is well-developed.   Pulmonary:      Effort: Pulmonary effort is normal.   Neurological:      General: No focal deficit present.      Mental Status: She is alert.   Psychiatric:         Behavior: Behavior normal.         Thought Content: Thought content normal.         Judgment: Judgment normal.         Current Outpatient Medications:     atorvastatin (LIPITOR) 80 MG tablet, TAKE ONE TABLET BY MOUTH DAILY, Disp: 90 tablet, Rfl: 3    calcium-vitamin D (OSCAL-500) 500-200 MG-UNIT per tablet, Take 1 tablet by mouth Daily., Disp: , Rfl:     clonazePAM (KlonoPIN) 1 MG tablet, Take 1 tablet by mouth 2 (Two) Times a Day As Needed for Anxiety., Disp: 10 tablet, Rfl: 0    escitalopram (LEXAPRO) 20 MG tablet, Take 1 tablet by mouth Daily., Disp: 90 tablet, Rfl: 1    latanoprost (XALATAN) 0.005 % ophthalmic solution, Administer 1 drop to both eyes Every Night., Disp: , Rfl:     lisinopril (PRINIVIL,ZESTRIL) 10 MG tablet, TAKE ONE TABLET BY MOUTH DAILY, Disp: 90 tablet, Rfl: 2    Multiple " Vitamins-Minerals (PRESERVISION AREDS 2) capsule, Take 1 capsule by mouth Daily., Disp: , Rfl:     timolol (TIMOPTIC) 0.5 % ophthalmic solution, Administer 1 drop to the right eye 2 (Two) Times a Day., Disp: , Rfl:     Assessment & Plan   Diagnoses and all orders for this visit:    1. Chronic pain of left ankle (Primary)  -     Ambulatory Referral to Orthopedic Surgery  -NSAIDs/Tylenol as needed  -Discussed PT though she has been doing PT for low back so deferred for now    2. Chronic pain of left knee  -     Ambulatory Referral to Orthopedic Surgery    3. Chronic left-sided low back pain with left-sided sciatica  -     Ambulatory Referral to Orthopedic Surgery    4. Need for pneumococcal vaccine  -     Pneumococcal Conjugate Vaccine 20-Valent (PCV20)             Lynda Landrum MD    Answers submitted by the patient for this visit:  Other (Submitted on 8/21/2023)  Please describe your symptoms.: Tendonitis in ankle, toe leg?  Have you had these symptoms before?: No  How long have you been having these symptoms?: Greater than 2 weeks  Please list any medications you are currently taking for this condition.: N/A  Please describe any probable cause for these symptoms. : Nerve damage?  Primary Reason for Visit (Submitted on 8/21/2023)  What is the primary reason for your visit?: Other

## 2023-08-24 DIAGNOSIS — F41.1 GAD (GENERALIZED ANXIETY DISORDER): ICD-10-CM

## 2023-08-24 RX ORDER — ESCITALOPRAM OXALATE 20 MG/1
TABLET ORAL
Qty: 90 TABLET | Refills: 1 | Status: SHIPPED | OUTPATIENT
Start: 2023-08-24

## 2023-08-29 ENCOUNTER — TELEPHONE (OUTPATIENT)
Dept: INTERNAL MEDICINE | Facility: CLINIC | Age: 83
End: 2023-08-29

## 2023-08-29 DIAGNOSIS — Z78.0 POSTMENOPAUSAL: Primary | ICD-10-CM

## 2023-08-29 NOTE — TELEPHONE ENCOUNTER
Caller: Joanne Tineo    Relationship: Self    Best call back number: [phone]    What orders are you requesting (i.e. lab or imaging): BONE DEX IMAGING    In what timeframe would the patient need to come in: ASAP    Where will you receive your lab/imaging services: [facility]    Additional notes: BONE DEX PLEASE

## 2023-09-06 ENCOUNTER — TRANSCRIBE ORDERS (OUTPATIENT)
Dept: ADMINISTRATIVE | Facility: HOSPITAL | Age: 83
End: 2023-09-06
Payer: MEDICARE

## 2023-09-06 DIAGNOSIS — Z12.31 VISIT FOR SCREENING MAMMOGRAM: Primary | ICD-10-CM

## 2023-09-18 ENCOUNTER — OFFICE VISIT (OUTPATIENT)
Dept: INTERNAL MEDICINE | Facility: CLINIC | Age: 83
End: 2023-09-18
Payer: MEDICARE

## 2023-09-18 VITALS
SYSTOLIC BLOOD PRESSURE: 126 MMHG | BODY MASS INDEX: 24.66 KG/M2 | HEIGHT: 65 IN | DIASTOLIC BLOOD PRESSURE: 62 MMHG | OXYGEN SATURATION: 95 % | WEIGHT: 148 LBS | HEART RATE: 71 BPM

## 2023-09-18 DIAGNOSIS — Z23 NEED FOR INFLUENZA VACCINATION: ICD-10-CM

## 2023-09-18 DIAGNOSIS — I10 PRIMARY HYPERTENSION: ICD-10-CM

## 2023-09-18 DIAGNOSIS — F41.9 ANXIETY: Primary | ICD-10-CM

## 2023-09-18 PROCEDURE — 99213 OFFICE O/P EST LOW 20 MIN: CPT | Performed by: INTERNAL MEDICINE

## 2023-09-18 PROCEDURE — 90662 IIV NO PRSV INCREASED AG IM: CPT | Performed by: INTERNAL MEDICINE

## 2023-09-18 PROCEDURE — 1160F RVW MEDS BY RX/DR IN RCRD: CPT | Performed by: INTERNAL MEDICINE

## 2023-09-18 PROCEDURE — 1159F MED LIST DOCD IN RCRD: CPT | Performed by: INTERNAL MEDICINE

## 2023-09-18 PROCEDURE — 3074F SYST BP LT 130 MM HG: CPT | Performed by: INTERNAL MEDICINE

## 2023-09-18 PROCEDURE — G0008 ADMIN INFLUENZA VIRUS VAC: HCPCS | Performed by: INTERNAL MEDICINE

## 2023-09-18 PROCEDURE — 3078F DIAST BP <80 MM HG: CPT | Performed by: INTERNAL MEDICINE

## 2023-09-18 RX ORDER — CLONAZEPAM 1 MG/1
1 TABLET ORAL 2 TIMES DAILY PRN
Qty: 30 TABLET | Refills: 0 | Status: SHIPPED | OUTPATIENT
Start: 2023-09-18

## 2023-09-18 NOTE — PROGRESS NOTES
"Subjective   Joanne Tineo is a 83 y.o. female here for follow-up anxiety and hypertension.  She denies any new issues.  She has [employer] insurance and after 9/22 she may not be able to see me.  He would like to get refills and go over some things.    I have reviewed the patient's relevant medical history and confirmed it is accurate.    I have personally reviewed and performed the ROS. Lynda Landrum MD     Objective   /62 (BP Location: Left arm)   Pulse 71   Ht 165.1 cm (65\")   Wt 67.1 kg (148 lb)   SpO2 95%   BMI 24.63 kg/m²     Physical Exam  Vitals reviewed.   Constitutional:       Appearance: She is well-developed.   Pulmonary:      Effort: Pulmonary effort is normal.   Neurological:      General: No focal deficit present.      Mental Status: She is alert.   Psychiatric:         Behavior: Behavior normal.         Thought Content: Thought content normal.         Judgment: Judgment normal.         Current Outpatient Medications:     atorvastatin (LIPITOR) 80 MG tablet, TAKE ONE TABLET BY MOUTH DAILY, Disp: 90 tablet, Rfl: 3    calcium-vitamin D (OSCAL-500) 500-200 MG-UNIT per tablet, Take 1 tablet by mouth Daily., Disp: , Rfl:     clonazePAM (KlonoPIN) 1 MG tablet, Take 1 tablet by mouth 2 (Two) Times a Day As Needed for Anxiety., Disp: 10 tablet, Rfl: 0    escitalopram (LEXAPRO) 20 MG tablet, TAKE ONE TABLET BY MOUTH DAILY, Disp: 90 tablet, Rfl: 1    latanoprost (XALATAN) 0.005 % ophthalmic solution, Administer 1 drop to both eyes Every Night., Disp: , Rfl:     lisinopril (PRINIVIL,ZESTRIL) 10 MG tablet, TAKE ONE TABLET BY MOUTH DAILY, Disp: 90 tablet, Rfl: 2    Multiple Vitamins-Minerals (PRESERVISION AREDS 2) capsule, Take 1 capsule by mouth Daily., Disp: , Rfl:     timolol (TIMOPTIC) 0.5 % ophthalmic solution, Administer 1 drop to the right eye 2 (Two) Times a Day., Disp: , Rfl:     Assessment & Plan   Diagnoses and all orders for this visit:    1. Anxiety (Primary)  -     " clonazePAM (KlonoPIN) 1 MG tablet; Take 1 tablet by mouth 2 (Two) Times a Day As Needed for Anxiety.  Dispense: 30 tablet; Refill: 0  -Will give #30 since I may not be able to see her again with her [employer] insurance    2. Primary hypertension  -At goal, continue current medication    3. Need for influenza vaccination  -     Fluzone High-Dose 65+yrs ([id number])             Lynda Landrum MD    Answers submitted by the patient for this visit:  Other (Submitted on 9/17/2023)  Please describe your symptoms.: Last visit before my insurance changes.  Have you had these symptoms before?: No  How long have you been having these symptoms?: Greater than 2 weeks  Please describe any probable cause for these symptoms. : Insurance  Primary Reason for Visit (Submitted on 9/17/2023)  What is the primary reason for your visit?: Other

## 2024-02-19 ENCOUNTER — TELEPHONE (OUTPATIENT)
Dept: INTERNAL MEDICINE | Facility: CLINIC | Age: 84
End: 2024-02-19
Payer: MEDICARE

## 2024-02-19 NOTE — TELEPHONE ENCOUNTER
Silvina Byrd is following up to see if a faxed was received for the patient. They sent a fax multiple times, most recently on 2/12 regarding an osteoporosis therapy request for the patient. Please call her back letting her know that it was received or if it needs to be faxed again. # [phone]

## 2024-02-21 NOTE — TELEPHONE ENCOUNTER
TAINA CALLING TO SEE IF THERE FAX WAS RECEIVED FROM YESTERDAY. PLEASE LET HER KNOW WHEN THAT FAX IS RECEIVED.

## 2024-05-17 DIAGNOSIS — E78.00 PURE HYPERCHOLESTEROLEMIA: ICD-10-CM

## 2024-05-17 DIAGNOSIS — I10 ELEVATED BLOOD PRESSURE READING IN OFFICE WITH DIAGNOSIS OF HYPERTENSION: ICD-10-CM

## 2024-05-17 RX ORDER — ATORVASTATIN CALCIUM 80 MG/1
TABLET, FILM COATED ORAL
Qty: 90 TABLET | Refills: 0 | Status: SHIPPED | OUTPATIENT
Start: 2024-05-17

## 2024-05-17 RX ORDER — LISINOPRIL 10 MG/1
10 TABLET ORAL DAILY
Qty: 90 TABLET | Refills: 0 | Status: SHIPPED | OUTPATIENT
Start: 2024-05-17

## 2024-08-16 DIAGNOSIS — I10 ELEVATED BLOOD PRESSURE READING IN OFFICE WITH DIAGNOSIS OF HYPERTENSION: ICD-10-CM

## 2024-08-16 DIAGNOSIS — E78.00 PURE HYPERCHOLESTEROLEMIA: ICD-10-CM

## 2024-08-19 RX ORDER — ATORVASTATIN CALCIUM 80 MG/1
TABLET, FILM COATED ORAL
Qty: 30 TABLET | Refills: 0 | Status: SHIPPED | OUTPATIENT
Start: 2024-08-19

## 2024-08-19 RX ORDER — LISINOPRIL 10 MG/1
10 TABLET ORAL DAILY
Qty: 30 TABLET | Refills: 0 | Status: SHIPPED | OUTPATIENT
Start: 2024-08-19

## 2024-09-27 DIAGNOSIS — I10 ELEVATED BLOOD PRESSURE READING IN OFFICE WITH DIAGNOSIS OF HYPERTENSION: ICD-10-CM

## 2024-09-27 DIAGNOSIS — E78.00 PURE HYPERCHOLESTEROLEMIA: ICD-10-CM

## 2024-09-27 RX ORDER — ATORVASTATIN CALCIUM 80 MG/1
80 TABLET, FILM COATED ORAL DAILY
Qty: 30 TABLET | Refills: 0 | OUTPATIENT
Start: 2024-09-27

## 2024-09-27 RX ORDER — LISINOPRIL 10 MG/1
10 TABLET ORAL DAILY
Qty: 30 TABLET | Refills: 0 | OUTPATIENT
Start: 2024-09-27

## 2025-05-08 NOTE — THERAPY WOUND CARE TREATMENT
Outpatient Rehabilitation - Wound/Debridement Treatment Note   [facility]     Patient Name: Joanne Tineo  : 1940  MRN: [id number]  Today's Date: 2020                 Admit Date: 2020    Visit Dx:    ICD-10-CM ICD-9-CM   1. Open wound of left lower extremity, subsequent encounter S81.802D V58.89     891.0       Patient Active Problem List   Diagnosis   • Depression   • Hearing impairment   • Hyperlipidemia   • Irritable bowel syndrome   • Left anterior fascicular block   • Macular degeneration   • Dyssomnia   • Vitamin B deficiency   • Hyperglycemia   • Family history of heart disease   • Vitamin D deficiency        Past Medical History:   Diagnosis Date   • Chronic anxiety Adulthood    Intermittent clonazepam   • Chronic lower back pain    • Depression 1984 - acute flare with 's death   • Fracture of pelvis (CMS/[facility])     Routine healing   • Hearing impairment 2014    Corrects with bilateral hearing aids   • Hyperlipidemia     Total greater than 300   • Irritable bowel syndrome    • Macular degeneration    • Pneumonia     Treated outpatient   • Post menopausal syndrome    • Prediabetes     Hemoglobin A1c 6.0%.   • Sleep disorder    • Vitamin D deficiency         Past Surgical History:   Procedure Laterality Date   • COLONOSCOPY  2016   • DIAGNOSTIC LAPAROSCOPY  1965    for infertility   • TONSILLECTOMY  1943         EVALUATION  PT Ortho     Row Name 20 [id number]       Subjective Comments    Subjective Comments  Pt without complaints.  States the bandage got a little wet, so she had to change the optifoam cover dressing.  -JM       Subjective Pain    Able to rate subjective pain?  yes  -JM    Pre-Treatment Pain Level  0  -JM    Post-Treatment Pain Level  0  -JM       Transfers    Sit-Stand [place] (Transfers)  independent  -JM    Stand-Sit [place] (Transfers)  independent  -JM    Comment (Transfers)  seated for tx  -JM       Gait/Stairs  Scheduled    "Assessment/Training    [place] Level (Gait)  independent  -      User Key  (r) = Recorded By, (t) = Taken By, (c) = Cosigned By    Initials Name Provider Type    Laquita Ortega, PT Physical Therapist          [facility] Wound     Row Name 07/28/20 0930             Wound 06/08/20 1015 Left anterior;lower leg Soft Tissue Necrosis    Wound - Properties Group Date first assessed: 06/08/20  -MW Time first assessed: 1015  -MW Present on Hospital Admission: Y  -MW Side: Left  -MW Orientation: anterior;lower  -MW Location: leg  -MW Primary Wound Type: Soft tissue  -MW Additional Comments: non healing surgical wound; s/p skin graft   -    Wound Image  Images linked: 1  -JM      Dressing Appearance  intact;dried drainage  -      Base  yellow;slough;moist;pink;red;granulating;epithelialization inf aspect closed, sup aspect clean/pink under biofilm  -      Periwound  intact;warm;blanchable;pink mod periwound crusting  -      Periwound Temperature  warm  -      Periwound Skin Turgor  soft  -      Edges  irregular;open  -JM      Wound Length (cm)  0.6 cm  -JM      Wound Width (cm)  0.2 cm  -      Wound Depth (cm)  0.1 cm  -      Drainage Characteristics/Odor  serous;yellow  -      Drainage Amount  scant  -      Care, Wound  cleansed with;wound cleanser;debrided;ultrasound therapy, non contact low frequency;honey applied MIST 6min  -JM      Dressing Care, Wound  dressing applied;collagen;antimicrobial agent applied;foam;border dressing huan, sorbact, HFBt, 4\" optifoam  -      Periwound Care, Wound  barrier ointment applied;cleansed with pH balanced cleanser;dry periwound area maintained z-guard  -        User Key  (r) = Recorded By, (t) = Taken By, (c) = Cosigned By    Initials Name Provider Type    Lakesha Fonseca, PT Physical Therapist    Laquita Ortega, PT Physical Therapist            WOUND DEBRIDEMENT  Total area of Debridement: 3cmsq  Debridement Site 1  Location- Site 1: " LLE  Selective Debridement- Site 1: Wound Surface <20cmsq  Instruments- Site 1: tweezers, #15, scapel  Excised Tissue Description- Site 1: minimum, slough, moderate, other (comment)(periwound crusts)  Bleeding- Site 1: none             Therapy Education     Row Name 07/28/20 0930             Therapy Education    Given  Bandaging/dressing change;Symptoms/condition management  -      Program  Reinforced  -      How Provided  Verbal;Demonstration  -      Provided to  Patient  -      Level of Understanding  Verbalized  -        User Key  (r) = Recorded By, (t) = Taken By, (c) = Cosigned By    Initials Name Provider Type    Laquita Ortega, PT Physical Therapist          Recommendation and Plan  PT Assessment/Plan     Row Name 07/28/20 0930          PT Assessment    Functional Limitations  Performance in self-care ADL;Other (comment) wound mgmt  -     Impairments  Integumentary integrity  -     Assessment Comments  Inf aspect closed today, superior residual area now clean/pink under biofilm layer, should reepithelialize in next several days.  Moderate periwound crusting PT debrided today with intact pink skin underneath.  Pt making good progress with current tx.  -        PT Plan    PT Frequency  2x/week  -     Physical Therapy Interventions (Optional Details)  patient/family education;wound care  -     PT Plan Comments  MIST, debridement  -       User Key  (r) = Recorded By, (t) = Taken By, (c) = Cosigned By    Initials Name Provider Type    Laquita Ortega, PT Physical Therapist          Goals  PT OP Goals     Row Name 07/28/20 0930          Time Calculation    PT Goal Re-Cert Due Date  09/08/20  -       User Key  (r) = Recorded By, (t) = Taken By, (c) = Cosigned By    Initials Name Provider Type    Laquita Ortega, PT Physical Therapist          PT Goal Re-Cert Due Date: 09/08/20            Time Calculation: Start Time: 0930  Therapy Charges for Today     Code Description  Service Date Service Provider Modifiers Qty    [id number] HC EVERARDO DEBRIDE OPEN WOUND UP TO 20CM 7/28/2020 Laquita Matamoros, PT GP 1    [id number] HC PT [facility] MIST 7/28/2020 Laquita Matamoros, PT GP 1                  Laquita Matamoros, PT  7/28/2020